# Patient Record
Sex: MALE | Race: WHITE | NOT HISPANIC OR LATINO | Employment: FULL TIME | URBAN - NONMETROPOLITAN AREA
[De-identification: names, ages, dates, MRNs, and addresses within clinical notes are randomized per-mention and may not be internally consistent; named-entity substitution may affect disease eponyms.]

---

## 2019-04-16 ENCOUNTER — OFFICE VISIT (OUTPATIENT)
Dept: FAMILY MEDICINE CLINIC | Facility: CLINIC | Age: 59
End: 2019-04-16

## 2019-04-16 VITALS
BODY MASS INDEX: 26.69 KG/M2 | WEIGHT: 156.31 LBS | HEART RATE: 74 BPM | SYSTOLIC BLOOD PRESSURE: 124 MMHG | OXYGEN SATURATION: 98 % | HEIGHT: 64 IN | DIASTOLIC BLOOD PRESSURE: 66 MMHG

## 2019-04-16 DIAGNOSIS — R21 RASH: ICD-10-CM

## 2019-04-16 DIAGNOSIS — J45.909 UNCOMPLICATED ASTHMA, UNSPECIFIED ASTHMA SEVERITY, UNSPECIFIED WHETHER PERSISTENT: ICD-10-CM

## 2019-04-16 DIAGNOSIS — J40 BRONCHITIS: Primary | ICD-10-CM

## 2019-04-16 PROCEDURE — 99213 OFFICE O/P EST LOW 20 MIN: CPT | Performed by: FAMILY MEDICINE

## 2019-04-16 NOTE — PROGRESS NOTES
Subjective   Timmy Diop is a 58 y.o. male.   Cc:follow up  History of Present Illness The patient comes in for follow up of bronchitis. He is completing his medications and it is doing better.He has a rash that comes up when he gets sick.    The following portions of the patient's history were reviewed and updated as appropriate: allergies, current medications, past family history, past medical history, past social history, past surgical history and problem list.    Review of Systems   Constitutional: Negative for fatigue and fever.   Respiratory: Negative for cough, chest tightness and stridor.    Cardiovascular: Negative for chest pain, palpitations and leg swelling.       Objective   Physical Exam   Constitutional: He appears well-developed and well-nourished.   HENT:   Head: Normocephalic and atraumatic.   Right Ear: External ear normal.   Left Ear: External ear normal.   Nose: Nose normal.   Mouth/Throat: Oropharynx is clear and moist.   Eyes: Pupils are equal, round, and reactive to light.   Neck: Normal range of motion.   Cardiovascular: Normal rate, regular rhythm and normal heart sounds. Exam reveals no gallop and no friction rub.   No murmur heard.  Pulmonary/Chest: Effort normal and breath sounds normal. No stridor. No respiratory distress. He has no wheezes. He has no rales.   Abdominal: Soft. Bowel sounds are normal. He exhibits no distension and no mass. There is no tenderness. There is no guarding.   Skin: Skin is warm. Rash noted.   There is a plaque that measures four inch by two inches.    Vitals reviewed.        Assessment/Plan   Timmy was seen today for establish care.    Diagnoses and all orders for this visit:    Bronchitis    Uncomplicated asthma, unspecified asthma severity, unspecified whether persistent    Rash      Return to the clinic in 6 month/s.  Will contact with results as needed.

## 2020-05-03 ENCOUNTER — APPOINTMENT (OUTPATIENT)
Dept: GENERAL RADIOLOGY | Facility: HOSPITAL | Age: 60
End: 2020-05-03

## 2020-05-03 ENCOUNTER — HOSPITAL ENCOUNTER (INPATIENT)
Facility: HOSPITAL | Age: 60
LOS: 5 days | Discharge: HOME | End: 2020-05-08
Attending: EMERGENCY MEDICINE | Admitting: HOSPITALIST

## 2020-05-03 ENCOUNTER — APPOINTMENT (OUTPATIENT)
Dept: CT IMAGING | Facility: HOSPITAL | Age: 60
End: 2020-05-03

## 2020-05-03 DIAGNOSIS — J96.01 ACUTE RESPIRATORY FAILURE WITH HYPOXIA AND HYPERCAPNIA (HCC): Primary | ICD-10-CM

## 2020-05-03 DIAGNOSIS — A41.9 SEPSIS, DUE TO UNSPECIFIED ORGANISM, UNSPECIFIED WHETHER ACUTE ORGAN DYSFUNCTION PRESENT (HCC): ICD-10-CM

## 2020-05-03 DIAGNOSIS — J96.02 ACUTE RESPIRATORY FAILURE WITH HYPOXIA AND HYPERCAPNIA (HCC): Primary | ICD-10-CM

## 2020-05-03 DIAGNOSIS — Z74.09 IMPAIRED PHYSICAL MOBILITY: ICD-10-CM

## 2020-05-03 DIAGNOSIS — Z74.09 IMPAIRED MOBILITY AND ACTIVITIES OF DAILY LIVING: ICD-10-CM

## 2020-05-03 DIAGNOSIS — Z78.9 IMPAIRED MOBILITY AND ACTIVITIES OF DAILY LIVING: ICD-10-CM

## 2020-05-03 DIAGNOSIS — J18.9 PNEUMONIA OF BOTH LOWER LOBES DUE TO INFECTIOUS ORGANISM: ICD-10-CM

## 2020-05-03 LAB
ALBUMIN SERPL-MCNC: 4.1 G/DL (ref 3.5–5.2)
ALBUMIN/GLOB SERPL: 1.1 G/DL
ALP SERPL-CCNC: 99 U/L (ref 39–117)
ALT SERPL W P-5'-P-CCNC: 27 U/L (ref 1–41)
ANION GAP SERPL CALCULATED.3IONS-SCNC: 19 MMOL/L (ref 5–15)
ARTERIAL PATENCY WRIST A: POSITIVE
ARTERIAL PATENCY WRIST A: POSITIVE
AST SERPL-CCNC: 27 U/L (ref 1–40)
ATMOSPHERIC PRESS: 745 MMHG
ATMOSPHERIC PRESS: 746 MMHG
B PERT DNA SPEC QL NAA+PROBE: NOT DETECTED
BASE EXCESS BLDA CALC-SCNC: 0.1 MMOL/L (ref 0–2)
BASE EXCESS BLDA CALC-SCNC: 0.1 MMOL/L (ref 0–2)
BASOPHILS # BLD AUTO: 0.15 10*3/MM3 (ref 0–0.2)
BASOPHILS NFR BLD AUTO: 1 % (ref 0–1.5)
BDY SITE: ABNORMAL
BDY SITE: ABNORMAL
BILIRUB SERPL-MCNC: 0.4 MG/DL (ref 0.2–1.2)
BUN BLD-MCNC: 10 MG/DL (ref 6–20)
BUN/CREAT SERPL: 9.3 (ref 7–25)
C PNEUM DNA NPH QL NAA+NON-PROBE: NOT DETECTED
CALCIUM SPEC-SCNC: 9.5 MG/DL (ref 8.6–10.5)
CHLORIDE SERPL-SCNC: 102 MMOL/L (ref 98–107)
CO2 SERPL-SCNC: 24 MMOL/L (ref 22–29)
CREAT BLD-MCNC: 1.07 MG/DL (ref 0.76–1.27)
CRP SERPL-MCNC: 1 MG/DL (ref 0–0.5)
D-LACTATE SERPL-SCNC: 1.9 MMOL/L (ref 0.5–2)
D-LACTATE SERPL-SCNC: 3.4 MMOL/L (ref 0.5–2)
DEPRECATED RDW RBC AUTO: 45.8 FL (ref 37–54)
EOSINOPHIL # BLD AUTO: 1.03 10*3/MM3 (ref 0–0.4)
EOSINOPHIL NFR BLD AUTO: 6.7 % (ref 0.3–6.2)
ERYTHROCYTE [DISTWIDTH] IN BLOOD BY AUTOMATED COUNT: 13.4 % (ref 12.3–15.4)
FLUAV H1 2009 PAND RNA NPH QL NAA+PROBE: NOT DETECTED
FLUAV H1 HA GENE NPH QL NAA+PROBE: NOT DETECTED
FLUAV H3 RNA NPH QL NAA+PROBE: NOT DETECTED
FLUAV SUBTYP SPEC NAA+PROBE: NOT DETECTED
FLUBV RNA ISLT QL NAA+PROBE: NOT DETECTED
GFR SERPL CREATININE-BSD FRML MDRD: 71 ML/MIN/1.73
GLOBULIN UR ELPH-MCNC: 3.9 GM/DL
GLUCOSE BLD-MCNC: 130 MG/DL (ref 65–99)
HADV DNA SPEC NAA+PROBE: NOT DETECTED
HCO3 BLDA-SCNC: 28.5 MMOL/L (ref 20–26)
HCO3 BLDA-SCNC: 29.5 MMOL/L (ref 20–26)
HCOV 229E RNA SPEC QL NAA+PROBE: NOT DETECTED
HCOV HKU1 RNA SPEC QL NAA+PROBE: NOT DETECTED
HCOV NL63 RNA SPEC QL NAA+PROBE: NOT DETECTED
HCOV OC43 RNA SPEC QL NAA+PROBE: NOT DETECTED
HCT VFR BLD AUTO: 53.2 % (ref 37.5–51)
HGB BLD-MCNC: 17.8 G/DL (ref 13–17.7)
HMPV RNA NPH QL NAA+NON-PROBE: NOT DETECTED
HOLD SPECIMEN: NORMAL
HOLD SPECIMEN: NORMAL
HOROWITZ INDEX BLD+IHG-RTO: 100 %
HPIV1 RNA SPEC QL NAA+PROBE: NOT DETECTED
HPIV2 RNA SPEC QL NAA+PROBE: NOT DETECTED
HPIV3 RNA NPH QL NAA+PROBE: NOT DETECTED
HPIV4 P GENE NPH QL NAA+PROBE: NOT DETECTED
IMM GRANULOCYTES # BLD AUTO: 0.08 10*3/MM3 (ref 0–0.05)
IMM GRANULOCYTES NFR BLD AUTO: 0.5 % (ref 0–0.5)
LACTATE HOLD SPECIMEN: NORMAL
LDH SERPL-CCNC: 218 U/L (ref 135–225)
LYMPHOCYTES # BLD AUTO: 4.75 10*3/MM3 (ref 0.7–3.1)
LYMPHOCYTES NFR BLD AUTO: 30.8 % (ref 19.6–45.3)
Lab: ABNORMAL
Lab: ABNORMAL
M PNEUMO IGG SER IA-ACNC: NOT DETECTED
MAGNESIUM SERPL-MCNC: 2.2 MG/DL (ref 1.6–2.6)
MCH RBC QN AUTO: 31.4 PG (ref 26.6–33)
MCHC RBC AUTO-ENTMCNC: 33.5 G/DL (ref 31.5–35.7)
MCV RBC AUTO: 93.8 FL (ref 79–97)
MODALITY: ABNORMAL
MODALITY: ABNORMAL
MONOCYTES # BLD AUTO: 1.69 10*3/MM3 (ref 0.1–0.9)
MONOCYTES NFR BLD AUTO: 11 % (ref 5–12)
NEUTROPHILS # BLD AUTO: 7.73 10*3/MM3 (ref 1.7–7)
NEUTROPHILS NFR BLD AUTO: 50 % (ref 42.7–76)
NRBC BLD AUTO-RTO: 0 /100 WBC (ref 0–0.2)
NT-PROBNP SERPL-MCNC: 66.8 PG/ML (ref 5–900)
PCO2 BLDA: 58.8 MM HG (ref 35–45)
PCO2 BLDA: 65.8 MM HG (ref 35–45)
PEEP RESPIRATORY: 5 CM[H2O]
PH BLDA: 7.26 PH UNITS (ref 7.35–7.45)
PH BLDA: 7.29 PH UNITS (ref 7.35–7.45)
PLATELET # BLD AUTO: 407 10*3/MM3 (ref 140–450)
PMV BLD AUTO: 10.2 FL (ref 6–12)
PO2 BLDA: 439 MM HG (ref 83–108)
PO2 BLDA: 87.4 MM HG (ref 83–108)
POTASSIUM BLD-SCNC: 4.3 MMOL/L (ref 3.5–5.2)
PROCALCITONIN SERPL-MCNC: 0.16 NG/ML (ref 0.1–0.25)
PROT SERPL-MCNC: 8 G/DL (ref 6–8.5)
RBC # BLD AUTO: 5.67 10*6/MM3 (ref 4.14–5.8)
RHINOVIRUS RNA SPEC NAA+PROBE: NOT DETECTED
RSV RNA NPH QL NAA+NON-PROBE: NOT DETECTED
SAO2 % BLDCOA: 95.7 % (ref 94–99)
SAO2 % BLDCOA: >100 % (ref 94–99)
SET MECH RESP RATE: 18
SODIUM BLD-SCNC: 145 MMOL/L (ref 136–145)
TROPONIN T SERPL-MCNC: <0.01 NG/ML (ref 0–0.03)
VENTILATOR MODE: ABNORMAL
VENTILATOR MODE: AC
VT ON VENT VENT: 500 ML
WBC NRBC COR # BLD: 15.43 10*3/MM3 (ref 3.4–10.8)
WHOLE BLOOD HOLD SPECIMEN: NORMAL
WHOLE BLOOD HOLD SPECIMEN: NORMAL

## 2020-05-03 PROCEDURE — 93010 ELECTROCARDIOGRAM REPORT: CPT | Performed by: INTERNAL MEDICINE

## 2020-05-03 PROCEDURE — 84484 ASSAY OF TROPONIN QUANT: CPT | Performed by: EMERGENCY MEDICINE

## 2020-05-03 PROCEDURE — 25010000002 SUCCINYLCHOLINE PER 20 MG: Performed by: EMERGENCY MEDICINE

## 2020-05-03 PROCEDURE — 0099U HC BIOFIRE FILMARRAY RESP PANEL 1: CPT | Performed by: EMERGENCY MEDICINE

## 2020-05-03 PROCEDURE — 94002 VENT MGMT INPAT INIT DAY: CPT

## 2020-05-03 PROCEDURE — 87040 BLOOD CULTURE FOR BACTERIA: CPT | Performed by: EMERGENCY MEDICINE

## 2020-05-03 PROCEDURE — 83605 ASSAY OF LACTIC ACID: CPT | Performed by: EMERGENCY MEDICINE

## 2020-05-03 PROCEDURE — 94640 AIRWAY INHALATION TREATMENT: CPT

## 2020-05-03 PROCEDURE — 25010000002 PROPOFOL 10 MG/ML EMULSION: Performed by: EMERGENCY MEDICINE

## 2020-05-03 PROCEDURE — 94799 UNLISTED PULMONARY SVC/PX: CPT

## 2020-05-03 PROCEDURE — 5A1945Z RESPIRATORY VENTILATION, 24-96 CONSECUTIVE HOURS: ICD-10-PCS | Performed by: HOSPITALIST

## 2020-05-03 PROCEDURE — 86140 C-REACTIVE PROTEIN: CPT | Performed by: EMERGENCY MEDICINE

## 2020-05-03 PROCEDURE — 87635 SARS-COV-2 COVID-19 AMP PRB: CPT | Performed by: EMERGENCY MEDICINE

## 2020-05-03 PROCEDURE — 25010000002 METHYLPREDNISOLONE PER 125 MG

## 2020-05-03 PROCEDURE — 71045 X-RAY EXAM CHEST 1 VIEW: CPT

## 2020-05-03 PROCEDURE — 82803 BLOOD GASES ANY COMBINATION: CPT

## 2020-05-03 PROCEDURE — 31500 INSERT EMERGENCY AIRWAY: CPT

## 2020-05-03 PROCEDURE — 85025 COMPLETE CBC W/AUTO DIFF WBC: CPT | Performed by: EMERGENCY MEDICINE

## 2020-05-03 PROCEDURE — 25010000002 PIPERACILLIN-TAZOBACTAM: Performed by: EMERGENCY MEDICINE

## 2020-05-03 PROCEDURE — 99291 CRITICAL CARE FIRST HOUR: CPT

## 2020-05-03 PROCEDURE — 0BH17EZ INSERTION OF ENDOTRACHEAL AIRWAY INTO TRACHEA, VIA NATURAL OR ARTIFICIAL OPENING: ICD-10-PCS | Performed by: EMERGENCY MEDICINE

## 2020-05-03 PROCEDURE — 84145 PROCALCITONIN (PCT): CPT | Performed by: EMERGENCY MEDICINE

## 2020-05-03 PROCEDURE — 25010000002 PROPOFOL 10 MG/ML EMULSION: Performed by: HOSPITALIST

## 2020-05-03 PROCEDURE — 83615 LACTATE (LD) (LDH) ENZYME: CPT | Performed by: EMERGENCY MEDICINE

## 2020-05-03 PROCEDURE — 25010000002 ENOXAPARIN PER 10 MG: Performed by: EMERGENCY MEDICINE

## 2020-05-03 PROCEDURE — 93005 ELECTROCARDIOGRAM TRACING: CPT | Performed by: EMERGENCY MEDICINE

## 2020-05-03 PROCEDURE — 25010000002 AZITHROMYCIN 500 MG/250 ML: Performed by: EMERGENCY MEDICINE

## 2020-05-03 PROCEDURE — 80053 COMPREHEN METABOLIC PANEL: CPT | Performed by: EMERGENCY MEDICINE

## 2020-05-03 PROCEDURE — 83735 ASSAY OF MAGNESIUM: CPT | Performed by: EMERGENCY MEDICINE

## 2020-05-03 PROCEDURE — 83880 ASSAY OF NATRIURETIC PEPTIDE: CPT | Performed by: EMERGENCY MEDICINE

## 2020-05-03 RX ORDER — METHYLPREDNISOLONE SODIUM SUCCINATE 125 MG/2ML
125 INJECTION, POWDER, LYOPHILIZED, FOR SOLUTION INTRAMUSCULAR; INTRAVENOUS ONCE
Status: COMPLETED | OUTPATIENT
Start: 2020-05-03 | End: 2020-05-03

## 2020-05-03 RX ORDER — SODIUM CHLORIDE 0.9 % (FLUSH) 0.9 %
10 SYRINGE (ML) INJECTION EVERY 12 HOURS SCHEDULED
Status: DISCONTINUED | OUTPATIENT
Start: 2020-05-04 | End: 2020-05-08 | Stop reason: HOSPADM

## 2020-05-03 RX ORDER — IPRATROPIUM BROMIDE AND ALBUTEROL SULFATE 2.5; .5 MG/3ML; MG/3ML
3 SOLUTION RESPIRATORY (INHALATION)
Status: DISCONTINUED | OUTPATIENT
Start: 2020-05-03 | End: 2020-05-03

## 2020-05-03 RX ORDER — VECURONIUM BROMIDE 20 MG/20ML
7 INJECTION, POWDER, LYOPHILIZED, FOR SOLUTION INTRAVENOUS ONCE
Status: COMPLETED | OUTPATIENT
Start: 2020-05-03 | End: 2020-05-03

## 2020-05-03 RX ORDER — PROPOFOL 10 MG/ML
VIAL (ML) INTRAVENOUS
Status: COMPLETED | OUTPATIENT
Start: 2020-05-03 | End: 2020-05-03

## 2020-05-03 RX ORDER — SUCCINYLCHOLINE CHLORIDE 20 MG/ML
INJECTION INTRAMUSCULAR; INTRAVENOUS
Status: COMPLETED | OUTPATIENT
Start: 2020-05-03 | End: 2020-05-03

## 2020-05-03 RX ORDER — DEXTROSE MONOHYDRATE 25 G/50ML
25 INJECTION, SOLUTION INTRAVENOUS
Status: DISCONTINUED | OUTPATIENT
Start: 2020-05-03 | End: 2020-05-08 | Stop reason: HOSPADM

## 2020-05-03 RX ORDER — FAMOTIDINE 10 MG/ML
20 INJECTION, SOLUTION INTRAVENOUS EVERY 12 HOURS SCHEDULED
Status: DISCONTINUED | OUTPATIENT
Start: 2020-05-04 | End: 2020-05-08 | Stop reason: HOSPADM

## 2020-05-03 RX ORDER — PROPOFOL 10 MG/ML
VIAL (ML) INTRAVENOUS
Status: DISCONTINUED
Start: 2020-05-03 | End: 2020-05-08 | Stop reason: HOSPADM

## 2020-05-03 RX ORDER — ALBUTEROL SULFATE 90 UG/1
2 AEROSOL, METERED RESPIRATORY (INHALATION)
Status: DISCONTINUED | OUTPATIENT
Start: 2020-05-04 | End: 2020-05-08 | Stop reason: HOSPADM

## 2020-05-03 RX ORDER — SODIUM CHLORIDE 0.9 % (FLUSH) 0.9 %
10 SYRINGE (ML) INJECTION AS NEEDED
Status: DISCONTINUED | OUTPATIENT
Start: 2020-05-03 | End: 2020-05-08 | Stop reason: HOSPADM

## 2020-05-03 RX ORDER — SODIUM CHLORIDE 9 MG/ML
10 INJECTION, SOLUTION INTRAVENOUS CONTINUOUS
Status: DISCONTINUED | OUTPATIENT
Start: 2020-05-03 | End: 2020-05-05

## 2020-05-03 RX ORDER — NICOTINE POLACRILEX 4 MG
15 LOZENGE BUCCAL
Status: DISCONTINUED | OUTPATIENT
Start: 2020-05-03 | End: 2020-05-08 | Stop reason: HOSPADM

## 2020-05-03 RX ORDER — MIDAZOLAM HYDROCHLORIDE 1 MG/ML
INJECTION INTRAMUSCULAR; INTRAVENOUS
Status: DISCONTINUED
Start: 2020-05-03 | End: 2020-05-08 | Stop reason: HOSPADM

## 2020-05-03 RX ORDER — CHLORHEXIDINE GLUCONATE 0.12 MG/ML
15 RINSE ORAL EVERY 12 HOURS SCHEDULED
Status: DISCONTINUED | OUTPATIENT
Start: 2020-05-04 | End: 2020-05-06

## 2020-05-03 RX ORDER — METHYLPREDNISOLONE SODIUM SUCCINATE 125 MG/2ML
INJECTION, POWDER, LYOPHILIZED, FOR SOLUTION INTRAMUSCULAR; INTRAVENOUS
Status: COMPLETED
Start: 2020-05-03 | End: 2020-05-03

## 2020-05-03 RX ORDER — ETOMIDATE 2 MG/ML
INJECTION INTRAVENOUS
Status: COMPLETED | OUTPATIENT
Start: 2020-05-03 | End: 2020-05-03

## 2020-05-03 RX ADMIN — IPRATROPIUM BROMIDE AND ALBUTEROL SULFATE 3 ML: 2.5; .5 SOLUTION RESPIRATORY (INHALATION) at 19:21

## 2020-05-03 RX ADMIN — METHYLPREDNISOLONE SODIUM SUCCINATE 125 MG: 125 INJECTION, POWDER, FOR SOLUTION INTRAMUSCULAR; INTRAVENOUS at 19:19

## 2020-05-03 RX ADMIN — PROPOFOL 70 MCG/KG/MIN: 10 INJECTION, EMULSION INTRAVENOUS at 22:18

## 2020-05-03 RX ADMIN — ENOXAPARIN SODIUM 80 MG: 80 INJECTION SUBCUTANEOUS at 22:15

## 2020-05-03 RX ADMIN — AZITHROMYCIN 500 MG: 500 INJECTION, POWDER, LYOPHILIZED, FOR SOLUTION INTRAVENOUS at 21:02

## 2020-05-03 RX ADMIN — ETOMIDATE 20 MG: 2 INJECTION, SOLUTION INTRAVENOUS at 19:45

## 2020-05-03 RX ADMIN — SUCCINYLCHOLINE CHLORIDE 100 MG: 20 INJECTION, SOLUTION INTRAMUSCULAR; INTRAVENOUS at 19:46

## 2020-05-03 RX ADMIN — SODIUM CHLORIDE 125 ML/HR: 900 INJECTION, SOLUTION INTRAVENOUS at 20:54

## 2020-05-03 RX ADMIN — PIPERACILLIN SODIUM,TAZOBACTAM SODIUM 3.38 G: 3; .375 INJECTION, POWDER, FOR SOLUTION INTRAVENOUS at 20:38

## 2020-05-03 RX ADMIN — VECURONIUM BROMIDE 7 MG: 1 INJECTION, POWDER, LYOPHILIZED, FOR SOLUTION INTRAVENOUS at 20:17

## 2020-05-03 RX ADMIN — PROPOFOL 30 MCG/KG/MIN: 10 INJECTION, EMULSION INTRAVENOUS at 20:03

## 2020-05-03 RX ADMIN — PROPOFOL 20 MCG/KG/MIN: 10 INJECTION, EMULSION INTRAVENOUS at 19:52

## 2020-05-03 RX ADMIN — METHYLPREDNISOLONE SODIUM SUCCINATE 125 MG: 125 INJECTION, POWDER, LYOPHILIZED, FOR SOLUTION INTRAMUSCULAR; INTRAVENOUS at 19:19

## 2020-05-04 ENCOUNTER — APPOINTMENT (OUTPATIENT)
Dept: GENERAL RADIOLOGY | Facility: HOSPITAL | Age: 60
End: 2020-05-04

## 2020-05-04 LAB
ANION GAP SERPL CALCULATED.3IONS-SCNC: 14 MMOL/L (ref 5–15)
ARTERIAL PATENCY WRIST A: ABNORMAL
ATMOSPHERIC PRESS: 747 MMHG
BASE EXCESS BLDA CALC-SCNC: 1.7 MMOL/L (ref 0–2)
BASOPHILS # BLD AUTO: 0.02 10*3/MM3 (ref 0–0.2)
BASOPHILS NFR BLD AUTO: 0.2 % (ref 0–1.5)
BDY SITE: ABNORMAL
BUN BLD-MCNC: 12 MG/DL (ref 6–20)
BUN/CREAT SERPL: 17.6 (ref 7–25)
CALCIUM SPEC-SCNC: 9 MG/DL (ref 8.6–10.5)
CHLORIDE SERPL-SCNC: 107 MMOL/L (ref 98–107)
CO2 SERPL-SCNC: 23 MMOL/L (ref 22–29)
CREAT BLD-MCNC: 0.68 MG/DL (ref 0.76–1.27)
DEPRECATED RDW RBC AUTO: 44.9 FL (ref 37–54)
EOSINOPHIL # BLD AUTO: 0.01 10*3/MM3 (ref 0–0.4)
EOSINOPHIL NFR BLD AUTO: 0.1 % (ref 0.3–6.2)
ERYTHROCYTE [DISTWIDTH] IN BLOOD BY AUTOMATED COUNT: 13.3 % (ref 12.3–15.4)
GFR SERPL CREATININE-BSD FRML MDRD: 120 ML/MIN/1.73
GLUCOSE BLD-MCNC: 169 MG/DL (ref 65–99)
GLUCOSE BLDC GLUCOMTR-MCNC: 112 MG/DL (ref 70–130)
GLUCOSE BLDC GLUCOMTR-MCNC: 141 MG/DL (ref 70–130)
GLUCOSE BLDC GLUCOMTR-MCNC: 155 MG/DL (ref 70–130)
GLUCOSE BLDC GLUCOMTR-MCNC: 157 MG/DL (ref 70–130)
HCO3 BLDA-SCNC: 25.9 MMOL/L (ref 20–26)
HCT VFR BLD AUTO: 47.2 % (ref 37.5–51)
HGB BLD-MCNC: 16.1 G/DL (ref 13–17.7)
HOROWITZ INDEX BLD+IHG-RTO: 30 %
IMM GRANULOCYTES # BLD AUTO: 0.05 10*3/MM3 (ref 0–0.05)
IMM GRANULOCYTES NFR BLD AUTO: 0.6 % (ref 0–0.5)
LYMPHOCYTES # BLD AUTO: 0.74 10*3/MM3 (ref 0.7–3.1)
LYMPHOCYTES NFR BLD AUTO: 8.8 % (ref 19.6–45.3)
Lab: ABNORMAL
MCH RBC QN AUTO: 31.1 PG (ref 26.6–33)
MCHC RBC AUTO-ENTMCNC: 34.1 G/DL (ref 31.5–35.7)
MCV RBC AUTO: 91.1 FL (ref 79–97)
MODALITY: ABNORMAL
MONOCYTES # BLD AUTO: 0.12 10*3/MM3 (ref 0.1–0.9)
MONOCYTES NFR BLD AUTO: 1.4 % (ref 5–12)
NEUTROPHILS # BLD AUTO: 7.43 10*3/MM3 (ref 1.7–7)
NEUTROPHILS NFR BLD AUTO: 88.9 % (ref 42.7–76)
NRBC BLD AUTO-RTO: 0 /100 WBC (ref 0–0.2)
PAW @ PEAK INSP FLOW SETTING VENT: 36 CMH2O
PCO2 BLDA: 38.6 MM HG (ref 35–45)
PEEP RESPIRATORY: 5 CM[H2O]
PH BLDA: 7.43 PH UNITS (ref 7.35–7.45)
PLATELET # BLD AUTO: 292 10*3/MM3 (ref 140–450)
PMV BLD AUTO: 10.8 FL (ref 6–12)
PO2 BLDA: 78.5 MM HG (ref 83–108)
POTASSIUM BLD-SCNC: 3.8 MMOL/L (ref 3.5–5.2)
RBC # BLD AUTO: 5.18 10*6/MM3 (ref 4.14–5.8)
SAO2 % BLDCOA: 96.5 % (ref 94–99)
SARS-COV-2 RNA RESP QL NAA+PROBE: NOT DETECTED
SET MECH RESP RATE: 20
SODIUM BLD-SCNC: 144 MMOL/L (ref 136–145)
VENTILATOR MODE: ABNORMAL
VT ON VENT VENT: 520 ML
WBC NRBC COR # BLD: 8.37 10*3/MM3 (ref 3.4–10.8)

## 2020-05-04 PROCEDURE — 3E0G76Z INTRODUCTION OF NUTRITIONAL SUBSTANCE INTO UPPER GI, VIA NATURAL OR ARTIFICIAL OPENING: ICD-10-PCS | Performed by: INTERNAL MEDICINE

## 2020-05-04 PROCEDURE — 85025 COMPLETE CBC W/AUTO DIFF WBC: CPT | Performed by: HOSPITALIST

## 2020-05-04 PROCEDURE — 94799 UNLISTED PULMONARY SVC/PX: CPT

## 2020-05-04 PROCEDURE — 25010000002 ENOXAPARIN PER 10 MG: Performed by: HOSPITALIST

## 2020-05-04 PROCEDURE — 25010000002 PROPOFOL 10 MG/ML EMULSION: Performed by: HOSPITALIST

## 2020-05-04 PROCEDURE — 87635 SARS-COV-2 COVID-19 AMP PRB: CPT | Performed by: INTERNAL MEDICINE

## 2020-05-04 PROCEDURE — 82962 GLUCOSE BLOOD TEST: CPT

## 2020-05-04 PROCEDURE — 82803 BLOOD GASES ANY COMBINATION: CPT

## 2020-05-04 PROCEDURE — 80048 BASIC METABOLIC PNL TOTAL CA: CPT | Performed by: HOSPITALIST

## 2020-05-04 PROCEDURE — 36600 WITHDRAWAL OF ARTERIAL BLOOD: CPT

## 2020-05-04 PROCEDURE — 25010000002 ENOXAPARIN PER 10 MG: Performed by: INTERNAL MEDICINE

## 2020-05-04 PROCEDURE — 94640 AIRWAY INHALATION TREATMENT: CPT

## 2020-05-04 RX ORDER — MIDAZOLAM HYDROCHLORIDE 1 MG/ML
2 INJECTION INTRAMUSCULAR; INTRAVENOUS ONCE
Status: DISCONTINUED | OUTPATIENT
Start: 2020-05-04 | End: 2020-05-06

## 2020-05-04 RX ADMIN — FAMOTIDINE 20 MG: 10 INJECTION INTRAVENOUS at 20:09

## 2020-05-04 RX ADMIN — ENOXAPARIN SODIUM 80 MG: 80 INJECTION SUBCUTANEOUS at 08:03

## 2020-05-04 RX ADMIN — CHLORHEXIDINE GLUCONATE 0.12% ORAL RINSE 15 ML: 1.2 LIQUID ORAL at 00:33

## 2020-05-04 RX ADMIN — ENOXAPARIN SODIUM 40 MG: 40 INJECTION SUBCUTANEOUS at 20:08

## 2020-05-04 RX ADMIN — SODIUM CHLORIDE, PRESERVATIVE FREE 10 ML: 5 INJECTION INTRAVENOUS at 20:10

## 2020-05-04 RX ADMIN — PROPOFOL 80 MCG/KG/MIN: 10 INJECTION, EMULSION INTRAVENOUS at 14:12

## 2020-05-04 RX ADMIN — CHLORHEXIDINE GLUCONATE 0.12% ORAL RINSE 15 ML: 1.2 LIQUID ORAL at 20:09

## 2020-05-04 RX ADMIN — SODIUM CHLORIDE, PRESERVATIVE FREE 10 ML: 5 INJECTION INTRAVENOUS at 08:05

## 2020-05-04 RX ADMIN — PROPOFOL 70 MCG/KG/MIN: 10 INJECTION, EMULSION INTRAVENOUS at 20:17

## 2020-05-04 RX ADMIN — ALBUTEROL SULFATE 2 PUFF: 90 AEROSOL, METERED RESPIRATORY (INHALATION) at 20:56

## 2020-05-04 RX ADMIN — FAMOTIDINE 20 MG: 10 INJECTION INTRAVENOUS at 00:33

## 2020-05-04 RX ADMIN — PROPOFOL 70 MCG/KG/MIN: 10 INJECTION, EMULSION INTRAVENOUS at 23:24

## 2020-05-04 RX ADMIN — PROPOFOL 55 MCG/KG/MIN: 10 INJECTION, EMULSION INTRAVENOUS at 01:36

## 2020-05-04 RX ADMIN — ALBUTEROL SULFATE 2 PUFF: 90 AEROSOL, METERED RESPIRATORY (INHALATION) at 08:37

## 2020-05-04 RX ADMIN — PROPOFOL 80 MCG/KG/MIN: 10 INJECTION, EMULSION INTRAVENOUS at 17:03

## 2020-05-04 RX ADMIN — PROPOFOL 60 MCG/KG/MIN: 10 INJECTION, EMULSION INTRAVENOUS at 11:04

## 2020-05-04 RX ADMIN — PROPOFOL 70 MCG/KG/MIN: 10 INJECTION, EMULSION INTRAVENOUS at 05:44

## 2020-05-04 RX ADMIN — CHLORHEXIDINE GLUCONATE 0.12% ORAL RINSE 15 ML: 1.2 LIQUID ORAL at 08:03

## 2020-05-04 RX ADMIN — FAMOTIDINE 20 MG: 10 INJECTION INTRAVENOUS at 08:03

## 2020-05-04 RX ADMIN — PROPOFOL 70 MCG/KG/MIN: 10 INJECTION, EMULSION INTRAVENOUS at 10:48

## 2020-05-04 NOTE — PLAN OF CARE
Problem: Ventilation, Mechanical Invasive (Adult)  Goal: Signs and Symptoms of Listed Potential Problems Will be Absent, Minimized or Managed (Ventilation, Mechanical Invasive)  Outcome: Ongoing (interventions implemented as appropriate)     Problem: Ventilation, Mechanical Invasive (Adult)  Intervention: Prevent Airway Displacement/Mechanical Dysfunction  Flowsheets (Taken 5/4/2020 1639)  Airway Safety Measures: suction at bedside  Intervention: Prevent Airway-Related Skin/Tissue Breakdown  Flowsheets (Taken 5/4/2020 1639)  Device Skin Pressure Protection: skin-to-device areas padded  Intervention: Prevent Ventilator-Induced Lung Injury  Flowsheets (Taken 5/4/2020 1639)  Lung Protection Measures: lung compliance monitored; optimal PEEP applied; plateau/inspiratory pressure monitored  Intervention: Prevent Ventilator-Associated Pneumonia (VAP)  Flowsheets  Taken 5/4/2020 1608 by Kim Echeverria RRT  Head of Bed (HOB): HOB at 30 degrees  Taken 5/4/2020 0420 by Alexsandra Knight, RN  VAP Prevention Bundle: HOB elevation maintained;oral care regularly provided;vent circuit breaks minimized;VTE prophylaxis provided  Intervention: Optimize Oxygenation/Ventilation  Flowsheets (Taken 5/4/2020 1639)  Airway/Ventilation Management: airway patency maintained       Pt Fail SBT to became very agitated and would not follow comands

## 2020-05-04 NOTE — CONSULTS
Adult Nutrition  Assessment    Patient Name:  Timmy Diop  YOB: 1960  MRN: [id number]  Admit Date:  5/3/2020    Assessment Date:  5/4/2020    Comments:  Pt currently NPO on the vent related to Acute resp failure due to COVID 19 pneumonia vs pulmonary embolism vs bacterial infection.  He is currently on Propofol at 29.3cc/hr providing 774 calories.  RD consulted to start EN.  Will start Peptamen VHP with goal rate of 45cc/hr with current propofol.  COVID 19 test negative--retesting.  Will monitor.     Reason for Assessment     Row Name 05/04/20 1223          Reason for Assessment    Reason For Assessment  physician consult;TF/PN     Diagnosis  pulmonary disease     Identified At Risk by Screening Criteria  tube feeding or parenteral nutrition         Nutrition/Diet History     Row Name 05/04/20 1223          Nutrition/Diet History    Typical Food/Fluid Intake  Per staff MD wants tube feeding started.  They did test him for COVID 19 but it came back negative-they are retesting him         Anthropometrics     Row Name 05/04/20 0543          Anthropometrics    Weight  75.5 kg (166 lb 7.2 oz)         Labs/Tests/Procedures/Meds     Row Name 05/04/20 1224          Labs/Procedures/Meds    Lab Results Reviewed  reviewed, pertinent     Lab Results Comments  FSBS 155/169/157        Diagnostic Tests/Procedures    Diagnostic Test/Procedure Reviewed  reviewed, pertinent     Diagnostic Test/Procedures Comments  INtubated        Medications    Pertinent Medications Reviewed  reviewed, pertinent     Pertinent Medications Comments  SSI         Physical Findings     Row Name 05/04/20 1226          Physical Findings    Overall Physical Appearance  on ventilator support     Tubes  orogastric tube         Estimated/Assessed Needs     Row Name 05/04/20 1124          Calculation Measurements    Weight Used For Calculations  70.8 kg (156 lb)        Estimated/Assessed Needs    Additional Documentation  Additional  Requirements (Group);Fluid Requirements (Group);Iroquois-St. Jeor Equation (Group);Protein Requirements (Group);Calorie Requirements (Group);KCAL/KG (Group)        Calorie Requirements    Estimated Calorie Requirement (kcal/day)  1800     Estimated Calorie Need Method  kcal/kg        KCAL/KG    KCAL/KG  30 Kcal/Kg (kcal);25 Kcal/Kg (kcal)     25 Kcal/Kg (kcal)  1769.025     30 Kcal/Kg (kcal)  2122.83        Iroquois-St. Jeor Equation    RMR (Iroquois-St. Jeor Equation)  1454.485        Protein Requirements    Weight Used For Protein Calculations  70.8 kg (156 lb)     Est Protein Requirement Amount (gms/kg)  1.2 gm protein     Estimated Protein Requirements (gms/day)  84.91        Fluid Requirements    Estimated Fluid Requirements (mL/day)  1800     Estimated Fluid Requirement Method  RDA Method     RDA Method (mL)  1800         Nutrition Prescription Ordered     Row Name 05/04/20 1228          Nutrition Prescription PO    Current PO Diet  NPO        Propofol Considerations    Propofol (mL/hr)  29.3 mL/hr     Propofol (Kcal/day)  774 Kcal/day                 Electronically signed by:  Lani Belcher RD  05/04/20 12:34

## 2020-05-04 NOTE — PLAN OF CARE
Problem: Patient Care Overview  Goal: Plan of Care Review  Outcome: Ongoing (interventions implemented as appropriate)  Flowsheets (Taken 5/4/2020 1923)  Progress: no change

## 2020-05-04 NOTE — ED NOTES
Daughter, Kaylan Diop, wants to be called for updates at    [phone]     Colton Rubio RN  05/03/20 2047

## 2020-05-04 NOTE — PROGRESS NOTES
[facility] ER Medicine Services  INPATIENT PROGRESS NOTE    Length of Stay: 1  Date of Admission: 5/3/2020  Primary Care Physician: Arvin Ness MD    Subjective   Chief Complaint: Respiratory failure.    HPI:  Patient is a 59-year-old  male past medical history of COPD who presented to the emergency department with shortness of breath.  In the emergency department he was noted to be markedly hypoxic and dyspneic.  He was initially placed on a nonrebreather and given steroids and bronchodilators.  This did not alleviate his symptoms.  He required tracheal intubation and mechanical ventilation.    Patient is seen for follow-up today.  He remains intubated, sedated and restrained.    Review of Systems  Unable to review as the patient is intubated and sedated.  Objective    Temp:  [98.5 °F (36.9 °C)-99.3 °F (37.4 °C)] 99.3 °F (37.4 °C)  Heart Rate:  [] 74  Resp:  [20-45] 20  BP: (101-231)/() 106/70  FiO2 (%):  [30 %-100 %] 30 %    Vent Settings:  FiO2 (%):  [30 %-100 %] 30 %  S RR:  [18-20] 20  PEEP/CPAP (cm H2O):  [5 cm H20] 5 cm H20  WI SUP:  [0 cm H20] 0 cm H20  MAP (cm H2O):  [14-15] 15    Physical Exam   Constitutional: He appears well-developed and well-nourished. No distress. He is sedated, intubated and restrained.   HENT:   Head: Normocephalic and atraumatic.   Eyes: No scleral icterus.   Neck: Neck supple. No JVD present. No thyromegaly present.   Cardiovascular: Normal rate, regular rhythm and normal heart sounds. Exam reveals no gallop and no friction rub.   No murmur heard.  Pulmonary/Chest: Effort normal. He is intubated. He has decreased breath sounds. He has no wheezes. He has rhonchi. He has no rales. He exhibits no tenderness.   Abdominal: Soft. Bowel sounds are normal. He exhibits no distension and no mass. There is no tenderness. There is no rebound and no guarding.   Musculoskeletal: He exhibits no edema, tenderness or deformity.      Neurological: He exhibits normal muscle tone.   Skin: Skin is warm and dry. No rash noted. He is not diaphoretic. No erythema. No pallor.   Nursing note and vitals reviewed.        Medication Review:    Current Facility-Administered Medications:   •  albuterol sulfate HFA (PROVENTIL HFA;VENTOLIN HFA;PROAIR HFA) inhaler 2 puff, 2 puff, Inhalation, Q4H - RT, Raul Eddy MD, 2 puff at 05/04/20 0837  •  chlorhexidine (PERIDEX) 0.12 % solution 15 mL, 15 mL, Mouth/Throat, Q12H, Raul Eddy MD, 15 mL at 05/04/20 0803  •  dextrose (D50W) 25 g/ 50mL Intravenous Solution 25 g, 25 g, Intravenous, Q15 Min PRN, Raul Eddy MD  •  dextrose (GLUTOSE) oral gel 15 g, 15 g, Oral, Q15 Min PRN, Raul Eddy MD  •  enoxaparin (LOVENOX) syringe 80 mg, 1 mg/kg, Subcutaneous, Q12H, Raul Eddy MD, 80 mg at 05/04/20 0803  •  famotidine (PEPCID) injection 20 mg, 20 mg, Intravenous, Q12H, Raul Eddy MD, 20 mg at 05/04/20 0803  •  glucagon (human recombinant) (GLUCAGEN DIAGNOSTIC) injection 1 mg, 1 mg, Subcutaneous, Q15 Min PRN, Raul Eddy MD  •  insulin aspart (novoLOG) injection 0-9 Units, 0-9 Units, Subcutaneous, TID AC, Raul Eddy MD  •  midazolam (VERSED) 2 MG/2ML injection  - ADS Override Pull, , , ,   •  propofol (DIPRIVAN) infusion 10 mg/mL 100 mL, 5-100 mcg/kg/min, Intravenous, Titrated, Raul Eddy MD, Last Rate: 24.8 mL/hr at 05/04/20 1117, 55 mcg/kg/min at 05/04/20 1117  •  sodium chloride 0.9 % flush 10 mL, 10 mL, Intravenous, PRN, Raul Eddy MD  •  sodium chloride 0.9 % flush 10 mL, 10 mL, Intravenous, Q12H, Raul Eddy MD, 10 mL at 05/04/20 0805  •  sodium chloride 0.9 % flush 10 mL, 10 mL, Intravenous, PRN, Raul Eddy MD  •  sodium chloride 0.9 % infusion, 10 mL/hr, Intravenous, Continuous, Raul Eddy MD, Last Rate: 10 mL/hr at 05/04/20 0234, 10 mL/hr at 05/04/20 0234    Results Review:  I have reviewed the labs, radiology results,  and diagnostic studies.    Laboratory Data:   Results from last 7 days   Lab Units 05/04/20  0302 05/03/20  1920   SODIUM mmol/L 144 145   POTASSIUM mmol/L 3.8 4.3   CHLORIDE mmol/L 107 102   CO2 mmol/L 23.0 24.0   BUN mg/dL 12 10   CREATININE mg/dL 0.68* 1.07   GLUCOSE mg/dL 169* 130*   CALCIUM mg/dL 9.0 9.5   BILIRUBIN mg/dL  --  0.4   ALK PHOS U/L  --  99   ALT (SGPT) U/L  --  27   AST (SGOT) U/L  --  27   ANION GAP mmol/L 14.0 19.0*     Estimated Creatinine Clearance: 112.4 mL/min (A) (by C-G formula based on SCr of 0.68 mg/dL (L)).  Results from last 7 days   Lab Units 05/03/20  1920   MAGNESIUM mg/dL 2.2         Results from last 7 days   Lab Units 05/04/20  0302 05/03/20 1920   WBC 10*3/mm3 8.37 15.43*   HEMOGLOBIN g/dL 16.1 17.8*   HEMATOCRIT % 47.2 53.2*   PLATELETS 10*3/mm3 292 407           Culture Data:   No results found for: BLOODCX  No results found for: URINECX  No results found for: RESPCX  No results found for: WOUNDCX  No results found for: STOOLCX  No components found for: BODYFLD    Radiology Data:   Imaging Results (Last 24 Hours)     Procedure Component Value Units Date/Time    XR Chest 1 View [[id number]] Collected:  05/03/20 2001     Updated:  05/03/20 2032    Narrative:       PROCEDURE: XR CHEST 1 VIEW    Clinical History: [facility] Triage Protocol    Indication:     Same as above.    Comparison:     4/12/2019.    Technique:     Single portable frontal projection of the chest was done.    Findings:    The tip of the endotracheal tube is 6 cm above the tracheal  bifurcation. The tip of the orogastric tube appears to be in the  fundus of the stomach, although it is suboptimally visualized.    Scattered soft tissue calcifications in the lower neck and the  both sides of the chest and bilateral arms are again noted.    There are no discrete airspace infiltrates, pneumothoraces or  pleural effusions.    The pulmonary vascularity is normal.    The cardiomediastinal contour is  unremarkable.       Impression:       Impression:   The tip of the endotracheal tube is 6 cm above the tracheal  bifurcation.   The tip of the orogastric tube appears to be in the fundus of the  stomach, although it is suboptimally visualized.  There is no acute pleural-parenchymal process seen in the imaged  lung fields.    Electronically signed by:  Kristian Hanks MD  5/3/2020 8:31 PM CDT  Workstation: [id number]          ABG:  Results from last 7 days   Lab Units 05/04/20  0430   PH, ARTERIAL pH units 7.435   PO2 ART mm Hg 78.5*   PCO2, ARTERIAL mm Hg 38.6   HCO3 ART mmol/L 25.9       I have reviewed the patient's current medications.     Assessment/Plan     Hospital Problem List:  Active Problems:    Acute respiratory failure with hypoxia and hypercapnia (CMS/HCC)  Likely secondary to COPD exacerbation and to rule out COVID-19 viral infection: Patient's chest x-ray did not show any acute processes.  Continue ventilatory support, empiric antimicrobial therapy, bronchodilators and begin daily SBT's.  Consult pulmonary if the need arises.    Sepsis due to possible viral pneumonia: Patient did screen positive for sepsis.  Continue antimicrobial therapy pending outcome of blood cultures and COVID-19 PCR.  Lactic acidosis and reactive leukocytosis have resolved.      Nutrition: Consult dietitian for NG tube feeding.    Continue GI and DVT prophylaxis.    More than 50 minutes of critical care time was spent in the assessment and formulation of treatment plan for this patient excluding billable procedures.    The patient was evaluated during the global COVID-19 pandemic, and the diagnosis was suspected/considered upon their initial presentation.  Evaluation, treatment, and testing was consistent with current guidelines for patients who present with complaints or symptoms that may be related to COVID-19.      Discharge Planning: In progress.    Abhi Mccartney MD   05/04/20   12:05

## 2020-05-04 NOTE — PLAN OF CARE
Patient vital signs stable throughout evening and tolerating ventilator.  Attempted to wean sedation and patient became agitated and desynchronous with the ventilator. Sedation increased and issues resolved.

## 2020-05-04 NOTE — ED PROVIDER NOTES
Subjective   59 years old male with history of COPD presented in the ER with chief complaint of sudden worsening shortness of breath almost 1 hour prior to arrival.  Patient reports he has been having shortness of breath off and on on a regular basis, taken breathing treatment and not severe breathlessness.  On presentation in the ER his sats were in 60s.  Placed on nonrebreather, given DuoNeb and Solu-Medrol.  Patient was still in respiratory distress.  COVID testing is done.  After informed consent patient is intubated.  She is limited secondary to acuity of condition.      History limited by:  Acuity of condition      Review of Systems   Unable to perform ROS: Acuity of condition       Past Medical History:   Diagnosis Date   • Asthma    • COPD (chronic obstructive pulmonary disease) (CMS/[facility])    • Erysipelas of lower extremity        Allergies   Allergen Reactions   • Shellfish-Derived Products        Past Surgical History:   Procedure Laterality Date   • INCISION AND DRAINAGE ABSCESS      right lateral chest wall   • ROTATOR CUFF REPAIR Bilateral 2008    Dr Fajardo       Family History   Problem Relation Age of Onset   • Arrhythmia Sister    • Coronary artery disease Brother         stent placement   • Heart attack Brother         CABG       Social History     Socioeconomic History   • Marital status:      Spouse name: Not on file   • Number of children: Not on file   • Years of education: Not on file   • Highest education level: Not on file   Tobacco Use   • Smoking status: Never Smoker   • Smokeless tobacco: Never Used   • Tobacco comment: around a lot of second hand smoke   Substance and Sexual Activity   • Alcohol use: Yes     Alcohol/week: 0.0 standard drinks     Comment: rare   • Sexual activity: Never           Objective   Physical Exam   Constitutional: He is oriented to person, place, and time. He appears well-developed and well-nourished. He appears distressed.   HENT:   Head: Normocephalic and  atraumatic.   Nose: Nose normal.   Mouth/Throat: Oropharynx is clear and moist.   Eyes: Conjunctivae are normal.   Neck: Normal range of motion. Neck supple.   Cardiovascular: Regular rhythm. Tachycardia present.   Pulmonary/Chest: He is in respiratory distress. He has decreased breath sounds. He has wheezes. He has rales.   Abdominal: Soft. There is no tenderness.   Musculoskeletal: Normal range of motion.   Neurological: He is alert and oriented to person, place, and time.   Skin: Skin is warm. Capillary refill takes less than 2 seconds.   Psychiatric: He has a normal mood and affect.   Nursing note and vitals reviewed.      ECG 12 Lead    Date/Time: 5/3/2020 8:53 PM  Performed by: Declan Darden MD  Authorized by: Declan Darden MD   Interpreted by physician  Rhythm: sinus tachycardia  Rate: normal  BPM: 127  QRS axis: normal  Conduction: incomplete RBBB  Clinical impression: abnormal ECG  Comments: Nonspecific ST and T wave changes    Intubation  Date/Time: 5/3/2020 7:48 PM  Performed by: Declan Darden MD  Authorized by: Declan Darden MD     Consent:     Consent obtained:  Verbal    Consent given by:  Patient    Risks discussed:  Aspiration, hypoxia, dental trauma, laryngeal injury, pneumothorax, death, brain injury and bleeding  Universal protocol:     Site/side marked: yes      Immediately prior to procedure, a time out was called: yes      Patient identity confirmed:  Verbally with patient  Pre-procedure details:     Patient status:  Awake    Paralytics:  Succinylcholine  Procedure details:     Preoxygenation:  Nonrebreather mask    CPR in progress: no      Intubation method:  Oral    Oral intubation technique:  Video-assisted    Tube size (mm):  7.0    Tube type:  Cuffed    Number of attempts:  1    Cricoid pressure: no      Tube visualized through cords: yes    Placement assessment:     ETT to lip:  22    Tube secured with:  Adhesive tape    Breath sounds:  Equal    Placement verification: chest rise,  condensation, CXR verification, direct visualization, equal breath sounds, fiberoptic scope and tube exhalation      CXR findings:  ETT in proper place  Post-procedure details:     Patient tolerance of procedure:  Tolerated well, no immediate complications  Critical Care  Performed by: Declan Darden MD  Authorized by: Declan Darden MD     Critical care provider statement:     Critical care time (minutes):  45    Critical care time was exclusive of:  Separately billable procedures and treating other patients    Critical care was necessary to treat or prevent imminent or life-threatening deterioration of the following conditions:  Cardiac failure, respiratory failure and sepsis    Critical care was time spent personally by me on the following activities:  Evaluation of patient's response to treatment, examination of patient, obtaining history from patient or surrogate, ventilator management, re-evaluation of patient's condition, pulse oximetry, ordering and review of radiographic studies, ordering and review of laboratory studies and ordering and performing treatments and interventions    I assumed direction of critical care for this patient from another provider in my specialty: no                 ED Course                                           MDM  Number of Diagnoses or Management Options  Acute respiratory failure with hypoxia and hypercapnia (CMS/HCC):   Pneumonia of both lower lobes due to infectious organism (CMS/HCC):   Sepsis, due to unspecified organism, unspecified whether acute organ dysfunction present (CMS/HCC):   Diagnosis management comments: Patient was in respiratory distress on presentation and did not improve much after placing on nonrebreather and Solu-Medrol/DuoNeb.  Patient is intubated.  Chest x-ray showed bilateral Haziness which I am worried about having pneumonia.  He has a white count of 15 and lactate of 3.4.  Started on broad-spectrum antibiotics.  Because of sudden onset shortness of  breath there is a concern for PE but patient is allergic to shellfish and CT cannot be done until patient is premedicated.  I have discussed with Dr. Eddy and he will premedicate patient before CT and would be admitted to ICU.  We will give a dose of Lovenox until CT is done.       Amount and/or Complexity of Data Reviewed  Clinical lab tests: ordered and reviewed  Tests in the radiology section of CPT®: ordered and reviewed  Discuss the patient with other providers: yes      Labs Reviewed   COMPREHENSIVE METABOLIC PANEL - Abnormal; Notable for the following components:       Result Value    Glucose 130 (*)     Anion Gap 19.0 (*)     All other components within normal limits    Narrative:     GFR Normal >60  Chronic Kidney Disease <60  Kidney Failure <15     CBC WITH AUTO DIFFERENTIAL - Abnormal; Notable for the following components:    WBC 15.43 (*)     Hemoglobin 17.8 (*)     Hematocrit 53.2 (*)     Eosinophil % 6.7 (*)     Neutrophils, Absolute 7.73 (*)     Lymphocytes, Absolute 4.75 (*)     Monocytes, Absolute 1.69 (*)     Eosinophils, Absolute 1.03 (*)     Immature Grans, Absolute 0.08 (*)     All other components within normal limits   C-REACTIVE PROTEIN - Abnormal; Notable for the following components:    C-Reactive Protein 1.00 (*)     All other components within normal limits   LACTIC ACID, PLASMA - Abnormal; Notable for the following components:    Lactate 3.4 (*)     All other components within normal limits   BLOOD GAS, ARTERIAL - Abnormal; Notable for the following components:    pH, Arterial 7.294 (*)     pCO2, Arterial 58.8 (*)     HCO3, Arterial 28.5 (*)     All other components within normal limits   BLOOD GAS, ARTERIAL - Abnormal; Notable for the following components:    pH, Arterial 7.260 (*)     pCO2, Arterial 65.8 (*)     pO2, Arterial 439.0 (*)     HCO3, Arterial 29.5 (*)     O2 Saturation, Arterial >100.0 (*)     All other components within normal limits   RESPIRATORY PANEL, PCR - Normal  "   Narrative:     The coronavirus on the RVP is NOT COVID-19 and is NOT indicative of infection with COVID-19.    BNP (IN-HOUSE) - Normal    Narrative:     Among patients with dyspnea, NT-proBNP is highly sensitive for the detection of acute congestive heart failure. In addition NT-proBNP of <300 pg/ml effectively rules out acute congestive heart failure with 99% negative predictive value.    Results may be falsely decreased if patient taking Biotin.     TROPONIN (IN-HOUSE) - Normal    Narrative:     Troponin T Reference Range:  <= 0.03 ng/mL-   Negative for AMI  >0.03 ng/mL-     Abnormal for myocardial necrosis.  Clinicians would have to utilize clinical acumen, EKG, Troponin and serial changes to determine if it is an Acute Myocardial Infarction or myocardial injury due to an underlying chronic condition.       Results may be falsely decreased if patient taking Biotin.     LACTATE DEHYDROGENASE - Normal   PROCALCITONIN - Normal    Narrative:     As a Marker for Sepsis (Non-Neonates):   1. <0.5 ng/mL represents a low risk of severe sepsis and/or septic shock.  1. >2 ng/mL represents a high risk of severe sepsis and/or septic shock.    As a Marker for Lower Respiratory Tract Infections that require antibiotic therapy:  PCT on Admission     Antibiotic Therapy             6-12 Hrs later  > 0.5                Strongly Recommended            >0.25 - <0.5         Recommended  0.1 - 0.25           Discouraged                   Remeasure/reassess PCT  <0.1                 Strongly Discouraged          Remeasure/reassess PCT      As 28 day mortality risk marker: \"Change in Procalcitonin Result\" (> 80 % or <=80 %) if Day 0 (or Day 1) and Day 4 values are available. Refer to http://www.[employer]-pct-calculator.com/   Change in PCT <=80 %   A decrease of PCT levels below or equal to 80 % defines a positive change in PCT test result representing a higher risk for 28-day all-cause mortality of patients diagnosed with severe sepsis " or septic shock.  Change in PCT > 80 %   A decrease of PCT levels of more than 80 % defines a negative change in PCT result representing a lower risk for 28-day all-cause mortality of patients diagnosed with severe sepsis or septic shock.                Results may be falsely decreased if patient taking Biotin.    MAGNESIUM - Normal   BLOOD CULTURE   BLOOD CULTURE   SARS-COV-2 PCR ([place] IN-HOUSE PERFORMED), NP SWAB IN TRANSPORT MEDIA   RAINBOW DRAW    Narrative:     The following orders were created for panel order [place] Draw.  Procedure                               Abnormality         Status                     ---------                               -----------         ------                     Light Blue Top[[id number]]                                   Final result               Green Top (Gel)[[id number]]                                  Final result               Lavender Top[[id number]]                                     Final result               Gold Top - SST[[id number]]                                   Final result                 Please view results for these tests on the individual orders.   BLOOD GAS, ARTERIAL   LACTIC ACID REFLEX TIMER   LACTIC ACID, REFLEX   CBC AND DIFFERENTIAL    Narrative:     The following orders were created for panel order CBC & Differential.  Procedure                               Abnormality         Status                     ---------                               -----------         ------                     CBC Auto Differential[[id number]]        Abnormal            Final result                 Please view results for these tests on the individual orders.   LIGHT BLUE TOP   GREEN TOP   LAVENDER TOP   GOLD TOP - SST       Xr Chest 1 View    Result Date: 5/3/2020  Narrative: PROCEDURE: XR CHEST 1 VIEW Clinical History: SOA Triage Protocol Indication:  Same as above. Comparison:  4/12/2019. Technique: Single portable frontal projection of the chest was done.  Findings: The tip of the endotracheal tube is 6 cm above the tracheal bifurcation. The tip of the orogastric tube appears to be in the fundus of the stomach, although it is suboptimally visualized. Scattered soft tissue calcifications in the lower neck and the both sides of the chest and bilateral arms are again noted. There are no discrete airspace infiltrates, pneumothoraces or pleural effusions. The pulmonary vascularity is normal. The cardiomediastinal contour is  unremarkable.     Impression: Impression: The tip of the endotracheal tube is 6 cm above the tracheal bifurcation. The tip of the orogastric tube appears to be in the fundus of the stomach, although it is suboptimally visualized. There is no acute pleural-parenchymal process seen in the imaged lung fields. Electronically signed by:  Kristian Hanks MD  5/3/2020 8:31 PM CDT Workstation: [id number]          Final diagnoses:   Acute respiratory failure with hypoxia and hypercapnia (CMS/HCC)   Pneumonia of both lower lobes due to infectious organism (CMS/HCC)   Sepsis, due to unspecified organism, unspecified whether acute organ dysfunction present (CMS/HCC)            Declan Darden MD  05/03/20 1095

## 2020-05-04 NOTE — H&P
[facility] Medicine Admission      Date of Admission: 5/3/2020      Primary Care Physician: Arvin Ness MD      Chief Complaint:  Shortness of breath    HPI: Patient is a 59-year-old  male past medical history of COPD who presented to the emergency department with shortness of breath.  In the emergency department he was noted to be markedly hypoxic and dyspneic.  He was initially placed on a nonrebreather and given steroids and bronchodilators.  This did not alleviate his symptoms.  He required tracheal intubation and mechanical ventilation.  Patient was intubated prior to my interview and all of his history comes from the medical record.    Concurrent Medical History:  has a past medical history of Asthma and Erysipelas of lower extremity.    Past Surgical History:  has a past surgical history that includes Rotator cuff repair (Bilateral, 2008) and Incision and Drainage Abscess.    Family History: family history includes Arrhythmia in his sister; Coronary artery disease in his brother; Heart attack in his brother.     Social History:  reports that he has never smoked. He has never used smokeless tobacco. He reports that he drinks alcohol.    Allergies:   Allergies   Allergen Reactions   • Shellfish-Derived Products        Medications:   Prior to Admission medications    Medication Sig Start Date End Date Taking? Authorizing Provider   albuterol (ACCUNEB) 1.25 MG/3ML nebulizer solution Take 3 mL by nebulization Every 6 (Six) Hours As Needed for Wheezing. 4/12/19   Scott Almanza MD   albuterol sulfate  (90 Base) MCG/ACT inhaler Inhale 2 puffs Every 6 (Six) Hours As Needed for Wheezing. 4/12/19   Scott Almanza MD       Review of Systems:  Review of Systems   Unable to perform ROS: Intubated     Physical Exam:   Temp:  [98.5 °F (36.9 °C)-99.3 °F (37.4 °C)] 99.3 °F (37.4 °C)  Heart Rate:  [] 94  Resp:  [20-45] 20  BP: (128-231)/()  155/92  FiO2 (%):  [30 %-100 %] 30 %     Physical Exam   Constitutional: He appears well-developed and well-nourished. He is sedated and intubated.   HENT:   Head: Normocephalic and atraumatic.   Nose: Nose normal.   Mouth/Throat: Oropharynx is clear and moist.   Eyes: Pupils are equal, round, and reactive to light. Conjunctivae, EOM and lids are normal. No scleral icterus.   Neck: Normal range of motion. Neck supple. No JVD present. No tracheal tenderness and no spinous process tenderness present. No neck rigidity. No tracheal deviation, no edema and normal range of motion present.   Cardiovascular: Normal rate, regular rhythm, S1 normal, S2 normal, normal heart sounds and normal pulses. Exam reveals no gallop and no friction rub.   No murmur heard.  Pulmonary/Chest: Effort normal and breath sounds normal. No accessory muscle usage. He is intubated. No respiratory distress. He has no decreased breath sounds. He has no wheezes. He has no rales. He exhibits no tenderness.   Abdominal: Soft. Bowel sounds are normal. He exhibits no distension and no mass. There is no tenderness. There is no rebound and no guarding.   Musculoskeletal: He exhibits no edema or tenderness.   Neurological:   Intubated and sedated   Skin: Skin is warm. No rash noted. No pallor.        Psychiatric:   Intubated and sedated         Results Reviewed:  I have personally reviewed current lab, radiology, and data and agree with results.  Lab Results (last 24 hours)     Procedure Component Value Units Date/Time    Respiratory Panel, PCR - Swab, Nasopharynx [[id number]] Collected:  05/03/20 2032    Specimen:  Swab from Nasopharynx Updated:  05/03/20 2035    SARS-COV-2 PCR ([facility] IN-HOUSE PERFORMED), NP SWAB IN TRANSPORT MEDIA - Swab, Nasopharynx [[id number]] Collected:  05/03/20 2032    Specimen:  Swab from Nasopharynx Updated:  05/03/20 2035    Blood Gas, Arterial [[id number]]  (Abnormal) Collected:  05/03/20 2025    Specimen:  Arterial Blood  Updated:  05/03/20 2032     Site Arterial Line     Jakub's Test Positive     pH, Arterial 7.260 pH units      Comment: 84 Value below reference range        pCO2, Arterial 65.8 mm Hg      Comment: 83 Value above reference range        pO2, Arterial 439.0 mm Hg      Comment: 83 Value above reference range        HCO3, Arterial 29.5 mmol/L      Comment: 83 Value above reference range        Base Excess, Arterial 0.1 mmol/L      O2 Saturation, Arterial >100.0 %      Comment: 93 Value above reportable range > 100.0        Barometric Pressure for Blood Gas 746 mmHg      Modality Ventilator     FIO2 100 %      Ventilator Mode AC     Set Tidal Volume 500     Set Mech Resp Rate 18.0     PEEP 5.0     Collected by CHIKIS MOSES     Comment: Meter: [id number]     :  [id number]       Blood Culture - Blood, Arm, Right [[id number]] Collected:  05/03/20 2027    Specimen:  Blood from Arm, Right Updated:  05/03/20 2031    Byron Draw [[id number]] Collected:  05/03/20 1920    Specimen:  Blood Updated:  05/03/20 2030    Narrative:       The following orders were created for panel order Byron Draw.  Procedure                               Abnormality         Status                     ---------                               -----------         ------                     Light Blue Top[[id number]]                                   Final result               Green Top (Gel)[[id number]]                                  Final result               Lavender Top[[id number]]                                     Final result               Gold Top - SST[[id number]]                                   Final result                 Please view results for these tests on the individual orders.    Light Blue Top [[id number]] Collected:  05/03/20 1920    Specimen:  Blood Updated:  05/03/20 2030     Extra Tube hold for add-on     Comment: Auto resulted       Green Top (Gel) [[id number]] Collected:  05/03/20 1920    Specimen:  Blood Updated:  05/03/20  2030     Extra Tube Hold for add-ons.     Comment: Auto resulted.       Lavender Top [[id number]] Collected:  05/03/20 1920    Specimen:  Blood Updated:  05/03/20 2030     Extra Tube hold for add-on     Comment: Auto resulted       Gold Top - SST [[id number]] Collected:  05/03/20 1920    Specimen:  Blood Updated:  05/03/20 2030     Extra Tube Hold for add-ons.     Comment: Auto resulted.       Comprehensive Metabolic Panel [[id number]]  (Abnormal) Collected:  05/03/20 1920    Specimen:  Blood Updated:  05/03/20 2008     Glucose 130 mg/dL      BUN 10 mg/dL      Creatinine 1.07 mg/dL      Sodium 145 mmol/L      Potassium 4.3 mmol/L      Chloride 102 mmol/L      CO2 24.0 mmol/L      Calcium 9.5 mg/dL      Total Protein 8.0 g/dL      Albumin 4.10 g/dL      ALT (SGPT) 27 U/L      AST (SGOT) 27 U/L      Alkaline Phosphatase 99 U/L      Total Bilirubin 0.4 mg/dL      eGFR Non African Amer 71 mL/min/1.73      Globulin 3.9 gm/dL      A/G Ratio 1.1 g/dL      BUN/Creatinine Ratio 9.3     Anion Gap 19.0 mmol/L     Narrative:       GFR Normal >60  Chronic Kidney Disease <60  Kidney Failure <15      Lactate Dehydrogenase [[id number]]  (Normal) Collected:  05/03/20 1920    Specimen:  Blood Updated:  05/03/20 2008      U/L     C-reactive Protein [[id number]]  (Abnormal) Collected:  05/03/20 1920    Specimen:  Blood Updated:  05/03/20 2008     C-Reactive Protein 1.00 mg/dL     Magnesium [[id number]]  (Normal) Collected:  05/03/20 1920    Specimen:  Blood Updated:  05/03/20 2008     Magnesium 2.2 mg/dL     Lactic Acid, Plasma [[id number]]  (Abnormal) Collected:  05/03/20 1930    Specimen:  Blood from Arm, Right Updated:  05/03/20 1959     Lactate 3.4 mmol/L     Lactic Acid, Reflex Timer (This will reflex a repeat order 3-3:15 hours after ordered.) [[id number]] Collected:  05/03/20 1930    Specimen:  Blood from Arm, Right Updated:  05/03/20 1958    Troponin [[id number]]  (Normal) Collected:  05/03/20 1920    Specimen:  Blood Updated:   "05/03/20 1957     Troponin T <0.010 ng/mL     Narrative:       Troponin T Reference Range:  <= 0.03 ng/mL-   Negative for AMI  >0.03 ng/mL-     Abnormal for myocardial necrosis.  Clinicians would have to utilize clinical acumen, EKG, Troponin and serial changes to determine if it is an Acute Myocardial Infarction or myocardial injury due to an underlying chronic condition.       Results may be falsely decreased if patient taking Biotin.      BNP [[id number]]  (Normal) Collected:  05/03/20 1920    Specimen:  Blood Updated:  05/03/20 1956     proBNP 66.8 pg/mL     Narrative:       Among patients with dyspnea, NT-proBNP is highly sensitive for the detection of acute congestive heart failure. In addition NT-proBNP of <300 pg/ml effectively rules out acute congestive heart failure with 99% negative predictive value.    Results may be falsely decreased if patient taking Biotin.      Procalcitonin [[id number]]  (Normal) Collected:  05/03/20 1920    Specimen:  Blood Updated:  05/03/20 1956     Procalcitonin 0.16 ng/mL     Narrative:       As a Marker for Sepsis (Non-Neonates):   1. <0.5 ng/mL represents a low risk of severe sepsis and/or septic shock.  1. >2 ng/mL represents a high risk of severe sepsis and/or septic shock.    As a Marker for Lower Respiratory Tract Infections that require antibiotic therapy:  PCT on Admission     Antibiotic Therapy             6-12 Hrs later  > 0.5                Strongly Recommended            >0.25 - <0.5         Recommended  0.1 - 0.25           Discouraged                   Remeasure/reassess PCT  <0.1                 Strongly Discouraged          Remeasure/reassess PCT      As 28 day mortality risk marker: \"Change in Procalcitonin Result\" (> 80 % or <=80 %) if Day 0 (or Day 1) and Day 4 values are available. Refer to http://www.BTIGs-pct-calculator.com/   Change in PCT <=80 %   A decrease of PCT levels below or equal to 80 % defines a positive change in PCT test result representing a " higher risk for 28-day all-cause mortality of patients diagnosed with severe sepsis or septic shock.  Change in PCT > 80 %   A decrease of PCT levels of more than 80 % defines a negative change in PCT result representing a lower risk for 28-day all-cause mortality of patients diagnosed with severe sepsis or septic shock.                Results may be falsely decreased if patient taking Biotin.     Blood Gas, Arterial [[id number]]  (Abnormal) Collected:  05/03/20 1926    Specimen:  Arterial Blood Updated:  05/03/20 1936     Site Arterial Line     Jakub's Test Positive     pH, Arterial 7.294 pH units      Comment: 84 Value below reference range        pCO2, Arterial 58.8 mm Hg      Comment: 83 Value above reference range        pO2, Arterial 87.4 mm Hg      HCO3, Arterial 28.5 mmol/L      Comment: 83 Value above reference range        Base Excess, Arterial 0.1 mmol/L      O2 Saturation, Arterial 95.7 %      Barometric Pressure for Blood Gas 745 mmHg      Modality NRB     Ventilator Mode NA     Collected by CHIKIS MOSES     Comment: Meter: [id number]     :  [id number]       Blood Culture - Blood, Arm, Right [[id number]] Collected:  05/03/20 1930    Specimen:  Blood from Arm, Right Updated:  05/03/20 1933    CBC & Differential [[id number]] Collected:  05/03/20 1920    Specimen:  Blood Updated:  05/03/20 1930    Narrative:       The following orders were created for panel order CBC & Differential.  Procedure                               Abnormality         Status                     ---------                               -----------         ------                     CBC Auto Differential[[id number]]        Abnormal            Final result                 Please view results for these tests on the individual orders.    CBC Auto Differential [[id number]]  (Abnormal) Collected:  05/03/20 1920    Specimen:  Blood Updated:  05/03/20 1930     WBC 15.43 10*3/mm3      RBC 5.67 10*6/mm3      Hemoglobin 17.8 g/dL       Hematocrit 53.2 %      MCV 93.8 fL      MCH 31.4 pg      MCHC 33.5 g/dL      RDW 13.4 %      RDW-SD 45.8 fl      MPV 10.2 fL      Platelets 407 10*3/mm3      Neutrophil % 50.0 %      Lymphocyte % 30.8 %      Monocyte % 11.0 %      Eosinophil % 6.7 %      Basophil % 1.0 %      Immature Grans % 0.5 %      Neutrophils, Absolute 7.73 10*3/mm3      Lymphocytes, Absolute 4.75 10*3/mm3      Monocytes, Absolute 1.69 10*3/mm3      Eosinophils, Absolute 1.03 10*3/mm3      Basophils, Absolute 0.15 10*3/mm3      Immature Grans, Absolute 0.08 10*3/mm3      nRBC 0.0 /100 WBC         Imaging Results (Last 24 Hours)     Procedure Component Value Units Date/Time    XR Chest 1 View [[id number]] Collected:  05/03/20 2001     Updated:  05/03/20 2032    Narrative:       PROCEDURE: XR CHEST 1 VIEW    Clinical History: [facility]A Triage Protocol    Indication:     Same as above.    Comparison:     4/12/2019.    Technique:     Single portable frontal projection of the chest was done.    Findings:    The tip of the endotracheal tube is 6 cm above the tracheal  bifurcation. The tip of the orogastric tube appears to be in the  fundus of the stomach, although it is suboptimally visualized.    Scattered soft tissue calcifications in the lower neck and the  both sides of the chest and bilateral arms are again noted.    There are no discrete airspace infiltrates, pneumothoraces or  pleural effusions.    The pulmonary vascularity is normal.    The cardiomediastinal contour is  unremarkable.      Impression:       Impression:   The tip of the endotracheal tube is 6 cm above the tracheal  bifurcation.   The tip of the orogastric tube appears to be in the fundus of the  stomach, although it is suboptimally visualized.  There is no acute pleural-parenchymal process seen in the imaged  lung fields.    Electronically signed by:  Kristian Hanks MD  5/3/2020 8:31 PM CDT  Workstation: [id number]            Assessment:    Active Hospital Problems    Diagnosis      • Acute respiratory failure with hypoxia and hypercapnia (CMS/HCC)             Plan:  1.  Acute hypoxic and hypercapnic respiratory failure: Differential diagnosis includes COVID-19 pneumonia, pulmonary embolism, and less likely bacterial infection.  Patient will be placed and enhanced droplet precautions, COVID testing will be done, full dose Lovenox will be given, steroids will be given, bronchodilators will be given, and cultures will be done.  Procalcitonin is normal, so we will hold on antibiotics for now.  2.  Combined respiratory and metabolic acidosis: Patient was hydrated the emergency department.  Continue mechanical ventilation.  Recheck ABG in the morning.  3.  History of erysipelas  4.  DVT prophylaxis: Therapeutic Lovenox.    Approximately 45 minutes of critical care time were spent managing the patient exclusive of billable procedures.     The patient was evaluated during the global COVID-19 pandemic, and the diagnosis was suspected/considered upon their initial presentation.  Evaluation, treatment, and testing was consistent with current guidelines for patients who present with complaints or symptoms that may be related to COVID-19.    I discussed the patient's findings and my recommendations with: Nursing        This document has been electronically signed by Raul Eddy MD on May 3, 2020 20:59

## 2020-05-04 NOTE — PLAN OF CARE
Problem: Ventilation, Mechanical Invasive (Adult)  Goal: Signs and Symptoms of Listed Potential Problems Will be Absent, Minimized or Managed (Ventilation, Mechanical Invasive)  Outcome: Ongoing (interventions implemented as appropriate)  Flowsheets (Taken 5/4/2020 1232)  Problems Assessed (Mechanical Ventilation, Invasive): inability to wean; malnutrition  Problems Present (Mech Vent, Invasive): inability to wean     Problem: Patient Care Overview  Goal: Plan of Care Review  Outcome: Ongoing (interventions implemented as appropriate)  Flowsheets (Taken 5/4/2020 1232)  Progress: no change  Plan of Care Reviewed With: caregiver  Outcome Summary: New Assessment:  Pt  is currently NPO on the vent due to resp failure with Pnemonia.  RD will start EN--Peptamen VHP with goal rate of 45cc/hr.  Will monitor.

## 2020-05-05 ENCOUNTER — APPOINTMENT (OUTPATIENT)
Dept: GENERAL RADIOLOGY | Facility: HOSPITAL | Age: 60
End: 2020-05-05

## 2020-05-05 LAB
ANION GAP SERPL CALCULATED.3IONS-SCNC: 14 MMOL/L (ref 5–15)
ARTERIAL PATENCY WRIST A: ABNORMAL
ATMOSPHERIC PRESS: 741 MMHG
BASE EXCESS BLDA CALC-SCNC: 2 MMOL/L (ref 0–2)
BASOPHILS # BLD AUTO: 0.06 10*3/MM3 (ref 0–0.2)
BASOPHILS NFR BLD AUTO: 0.5 % (ref 0–1.5)
BDY SITE: ABNORMAL
BUN BLD-MCNC: 17 MG/DL (ref 6–20)
BUN/CREAT SERPL: 23 (ref 7–25)
CALCIUM SPEC-SCNC: 8.9 MG/DL (ref 8.6–10.5)
CHLORIDE SERPL-SCNC: 105 MMOL/L (ref 98–107)
CO2 SERPL-SCNC: 24 MMOL/L (ref 22–29)
CREAT BLD-MCNC: 0.74 MG/DL (ref 0.76–1.27)
DEPRECATED RDW RBC AUTO: 46.5 FL (ref 37–54)
EOSINOPHIL # BLD AUTO: 0.07 10*3/MM3 (ref 0–0.4)
EOSINOPHIL NFR BLD AUTO: 0.6 % (ref 0.3–6.2)
ERYTHROCYTE [DISTWIDTH] IN BLOOD BY AUTOMATED COUNT: 13.7 % (ref 12.3–15.4)
GFR SERPL CREATININE-BSD FRML MDRD: 109 ML/MIN/1.73
GLUCOSE BLD-MCNC: 102 MG/DL (ref 65–99)
GLUCOSE BLDC GLUCOMTR-MCNC: 122 MG/DL (ref 70–130)
GLUCOSE BLDC GLUCOMTR-MCNC: 70 MG/DL (ref 70–130)
GLUCOSE BLDC GLUCOMTR-MCNC: 71 MG/DL (ref 70–130)
HCO3 BLDA-SCNC: 26.6 MMOL/L (ref 20–26)
HCT VFR BLD AUTO: 46.8 % (ref 37.5–51)
HGB BLD-MCNC: 15.9 G/DL (ref 13–17.7)
HOROWITZ INDEX BLD+IHG-RTO: 30 %
IMM GRANULOCYTES # BLD AUTO: 0.07 10*3/MM3 (ref 0–0.05)
IMM GRANULOCYTES NFR BLD AUTO: 0.6 % (ref 0–0.5)
LYMPHOCYTES # BLD AUTO: 2.07 10*3/MM3 (ref 0.7–3.1)
LYMPHOCYTES NFR BLD AUTO: 18.8 % (ref 19.6–45.3)
Lab: ABNORMAL
MCH RBC QN AUTO: 31.5 PG (ref 26.6–33)
MCHC RBC AUTO-ENTMCNC: 34 G/DL (ref 31.5–35.7)
MCV RBC AUTO: 92.9 FL (ref 79–97)
MODALITY: ABNORMAL
MONOCYTES # BLD AUTO: 1.24 10*3/MM3 (ref 0.1–0.9)
MONOCYTES NFR BLD AUTO: 11.3 % (ref 5–12)
NEUTROPHILS # BLD AUTO: 7.5 10*3/MM3 (ref 1.7–7)
NEUTROPHILS NFR BLD AUTO: 68.2 % (ref 42.7–76)
NRBC BLD AUTO-RTO: 0 /100 WBC (ref 0–0.2)
PCO2 BLDA: 40.4 MM HG (ref 35–45)
PEEP RESPIRATORY: 5 CM[H2O]
PH BLDA: 7.43 PH UNITS (ref 7.35–7.45)
PLATELET # BLD AUTO: 233 10*3/MM3 (ref 140–450)
PMV BLD AUTO: 10.4 FL (ref 6–12)
PO2 BLDA: 93.9 MM HG (ref 83–108)
POTASSIUM BLD-SCNC: 3.7 MMOL/L (ref 3.5–5.2)
RBC # BLD AUTO: 5.04 10*6/MM3 (ref 4.14–5.8)
SAO2 % BLDCOA: 98 % (ref 94–99)
SARS-COV-2 RNA RESP QL NAA+PROBE: NOT DETECTED
SET MECH RESP RATE: 20
SODIUM BLD-SCNC: 143 MMOL/L (ref 136–145)
VENTILATOR MODE: AC
VT ON VENT VENT: 500 ML
WBC NRBC COR # BLD: 11.01 10*3/MM3 (ref 3.4–10.8)

## 2020-05-05 PROCEDURE — 25010000002 PROPOFOL 10 MG/ML EMULSION: Performed by: HOSPITALIST

## 2020-05-05 PROCEDURE — 94799 UNLISTED PULMONARY SVC/PX: CPT

## 2020-05-05 PROCEDURE — 82803 BLOOD GASES ANY COMBINATION: CPT

## 2020-05-05 PROCEDURE — 25010000002 FENTANYL CITRATE (PF) 100 MCG/2ML SOLUTION: Performed by: INTERNAL MEDICINE

## 2020-05-05 PROCEDURE — 82962 GLUCOSE BLOOD TEST: CPT

## 2020-05-05 PROCEDURE — 80048 BASIC METABOLIC PNL TOTAL CA: CPT | Performed by: HOSPITALIST

## 2020-05-05 PROCEDURE — 25010000002 FUROSEMIDE PER 20 MG: Performed by: INTERNAL MEDICINE

## 2020-05-05 PROCEDURE — 94003 VENT MGMT INPAT SUBQ DAY: CPT

## 2020-05-05 PROCEDURE — 74018 RADEX ABDOMEN 1 VIEW: CPT

## 2020-05-05 PROCEDURE — 85025 COMPLETE CBC W/AUTO DIFF WBC: CPT | Performed by: HOSPITALIST

## 2020-05-05 PROCEDURE — 25010000002 ENOXAPARIN PER 10 MG: Performed by: INTERNAL MEDICINE

## 2020-05-05 PROCEDURE — 36600 WITHDRAWAL OF ARTERIAL BLOOD: CPT

## 2020-05-05 RX ORDER — SODIUM CHLORIDE 9 MG/ML
100 INJECTION, SOLUTION INTRAVENOUS CONTINUOUS
Status: DISCONTINUED | OUTPATIENT
Start: 2020-05-05 | End: 2020-05-05

## 2020-05-05 RX ORDER — FUROSEMIDE 10 MG/ML
40 INJECTION INTRAMUSCULAR; INTRAVENOUS ONCE
Status: COMPLETED | OUTPATIENT
Start: 2020-05-05 | End: 2020-05-05

## 2020-05-05 RX ORDER — FENTANYL CITRATE 50 UG/ML
25 INJECTION, SOLUTION INTRAMUSCULAR; INTRAVENOUS
Status: DISCONTINUED | OUTPATIENT
Start: 2020-05-05 | End: 2020-05-08 | Stop reason: HOSPADM

## 2020-05-05 RX ORDER — BISACODYL 10 MG
10 SUPPOSITORY, RECTAL RECTAL DAILY PRN
Status: DISCONTINUED | OUTPATIENT
Start: 2020-05-05 | End: 2020-05-08 | Stop reason: HOSPADM

## 2020-05-05 RX ADMIN — BISACODYL 10 MG: 10 SUPPOSITORY RECTAL at 16:28

## 2020-05-05 RX ADMIN — ALBUTEROL SULFATE 2 PUFF: 90 AEROSOL, METERED RESPIRATORY (INHALATION) at 19:36

## 2020-05-05 RX ADMIN — PROPOFOL 65 MCG/KG/MIN: 10 INJECTION, EMULSION INTRAVENOUS at 01:20

## 2020-05-05 RX ADMIN — ALBUTEROL SULFATE 2 PUFF: 90 AEROSOL, METERED RESPIRATORY (INHALATION) at 13:16

## 2020-05-05 RX ADMIN — ALBUTEROL SULFATE 2 PUFF: 90 AEROSOL, METERED RESPIRATORY (INHALATION) at 16:14

## 2020-05-05 RX ADMIN — CHLORHEXIDINE GLUCONATE 0.12% ORAL RINSE 15 ML: 1.2 LIQUID ORAL at 08:42

## 2020-05-05 RX ADMIN — FENTANYL CITRATE 25 MCG: 50 INJECTION, SOLUTION INTRAMUSCULAR; INTRAVENOUS at 16:13

## 2020-05-05 RX ADMIN — FENTANYL CITRATE 25 MCG: 50 INJECTION, SOLUTION INTRAMUSCULAR; INTRAVENOUS at 11:07

## 2020-05-05 RX ADMIN — ALBUTEROL SULFATE 2 PUFF: 90 AEROSOL, METERED RESPIRATORY (INHALATION) at 07:28

## 2020-05-05 RX ADMIN — ENOXAPARIN SODIUM 40 MG: 40 INJECTION SUBCUTANEOUS at 20:35

## 2020-05-05 RX ADMIN — FENTANYL CITRATE 25 MCG: 50 INJECTION, SOLUTION INTRAMUSCULAR; INTRAVENOUS at 20:35

## 2020-05-05 RX ADMIN — FAMOTIDINE 20 MG: 10 INJECTION INTRAVENOUS at 08:43

## 2020-05-05 RX ADMIN — ALBUTEROL SULFATE 2 PUFF: 90 AEROSOL, METERED RESPIRATORY (INHALATION) at 00:08

## 2020-05-05 RX ADMIN — SODIUM CHLORIDE 100 ML/HR: 9 INJECTION, SOLUTION INTRAVENOUS at 01:47

## 2020-05-05 RX ADMIN — FUROSEMIDE 40 MG: 10 INJECTION, SOLUTION INTRAMUSCULAR; INTRAVENOUS at 14:15

## 2020-05-05 RX ADMIN — SODIUM CHLORIDE, PRESERVATIVE FREE 10 ML: 5 INJECTION INTRAVENOUS at 20:39

## 2020-05-05 RX ADMIN — ENOXAPARIN SODIUM 40 MG: 40 INJECTION SUBCUTANEOUS at 08:42

## 2020-05-05 RX ADMIN — FAMOTIDINE 20 MG: 10 INJECTION INTRAVENOUS at 20:35

## 2020-05-05 RX ADMIN — CHLORHEXIDINE GLUCONATE 0.12% ORAL RINSE 15 ML: 1.2 LIQUID ORAL at 20:35

## 2020-05-05 RX ADMIN — PROPOFOL 90 MCG/KG/MIN: 10 INJECTION, EMULSION INTRAVENOUS at 21:20

## 2020-05-05 RX ADMIN — FENTANYL CITRATE 25 MCG: 50 INJECTION, SOLUTION INTRAMUSCULAR; INTRAVENOUS at 12:59

## 2020-05-05 RX ADMIN — PROPOFOL 70 MCG/KG/MIN: 10 INJECTION, EMULSION INTRAVENOUS at 14:51

## 2020-05-05 RX ADMIN — PROPOFOL 60 MCG/KG/MIN: 10 INJECTION, EMULSION INTRAVENOUS at 17:55

## 2020-05-05 RX ADMIN — PROPOFOL 70 MCG/KG/MIN: 10 INJECTION, EMULSION INTRAVENOUS at 05:04

## 2020-05-05 RX ADMIN — SODIUM CHLORIDE 100 ML/HR: 9 INJECTION, SOLUTION INTRAVENOUS at 07:20

## 2020-05-05 RX ADMIN — ALBUTEROL SULFATE 2 PUFF: 90 AEROSOL, METERED RESPIRATORY (INHALATION) at 23:32

## 2020-05-05 RX ADMIN — SODIUM CHLORIDE, PRESERVATIVE FREE 10 ML: 5 INJECTION INTRAVENOUS at 08:43

## 2020-05-05 NOTE — PLAN OF CARE
Patient sedated and on ventilator overnight, hemodynamically stable.  Patient's urine output decreased at approximately 1am and has since responded to IVF that was started at 100ml/hr while patient advances to tube feeding goal.  SBT attempted this morning and had to be stopped due to increased agitation.

## 2020-05-05 NOTE — PLAN OF CARE
Problem: Patient Care Overview  Goal: Plan of Care Review  Outcome: Ongoing (interventions implemented as appropriate)  Flowsheets (Taken 5/4/2020 2225)  Progress: no change

## 2020-05-05 NOTE — PROGRESS NOTES
[facility] Medicine Services  INPATIENT PROGRESS NOTE    Length of Stay: 2  Date of Admission: 5/3/2020  Primary Care Physician: Arvin Ness MD    Subjective   Chief Complaint: Respiratory failure.    HPI:  Patient is a 59-year-old  male past medical history of COPD who presented to the emergency department with shortness of breath.  In the emergency department he was noted to be markedly hypoxic and dyspneic.  He was initially placed on a nonrebreather and given steroids and bronchodilators.  This did not alleviate his symptoms.  He required tracheal intubation and mechanical ventilation.    Patient is seen for follow-up today.  He remains intubated, sedated, restrained and failed SBT today.  Mild abdominal distention has been reported but patient does not have any residual with NG tube feeding.    Review of Systems  Unable to review as the patient is intubated and sedated.  Objective    Heart Rate:  [] 61  Resp:  [19-27] 19  BP: ()/(55-88) 112/71  FiO2 (%):  [30 %] 30 %    Vent Settings:  FiO2 (%):  [30 %] 30 %  S RR:  [20] 20  PEEP/CPAP (cm H2O):  [5 cm H20] 5 cm H20  [place] SUP:  [0 cm H20-8 cm H20] 8 cm H20  MAP (cm H2O):  [12.9-25] 15    Physical Exam   Constitutional: He appears well-developed and well-nourished. No distress. He is sedated, intubated and restrained.   HENT:   Head: Normocephalic and atraumatic.   Eyes: No scleral icterus.   Neck: Neck supple. No JVD present. No thyromegaly present.   Cardiovascular: Normal rate, regular rhythm and normal heart sounds. Exam reveals no gallop and no friction rub.   No murmur heard.  Pulmonary/Chest: Effort normal. He is intubated. He has decreased breath sounds. He has no wheezes. He has rhonchi. He has no rales. He exhibits no tenderness.   Abdominal: Soft. He exhibits distension. He exhibits no mass. Bowel sounds are decreased. There is no tenderness. There is no rebound and no guarding.      Musculoskeletal: He exhibits no edema, tenderness or deformity.   Neurological: He exhibits normal muscle tone.   Skin: Skin is warm and dry. No rash noted. He is not diaphoretic. No erythema. No pallor.   Nursing note and vitals reviewed.        Medication Review:    Current Facility-Administered Medications:   •  albuterol sulfate HFA (PROVENTIL HFA;VENTOLIN HFA;PROAIR HFA) inhaler 2 puff, 2 puff, Inhalation, Q4H - RT, Raul Eddy MD, 2 puff at 05/05/20 0728  •  chlorhexidine (PERIDEX) 0.12 % solution 15 mL, 15 mL, Mouth/Throat, Q12H, Raul Eddy MD, 15 mL at 05/05/20 0842  •  dextrose (D50W) 25 g/ 50mL Intravenous Solution 25 g, 25 g, Intravenous, Q15 Min PRN, Raul Eddy MD  •  dextrose (GLUTOSE) oral gel 15 g, 15 g, Oral, Q15 Min PRN, Raul Eddy MD  •  enoxaparin (LOVENOX) syringe 40 mg, 40 mg, Subcutaneous, Q12H, Abhi Mccartney MD, 40 mg at 05/05/20 0842  •  famotidine (PEPCID) injection 20 mg, 20 mg, Intravenous, Q12H, Raul Eddy MD, 20 mg at 05/05/20 0843  •  fentaNYL citrate (PF) (SUBLIMAZE) injection 25 mcg, 25 mcg, Intravenous, Q2H PRN, Abhi Mccartney MD  •  glucagon (human recombinant) (GLUCAGEN DIAGNOSTIC) injection 1 mg, 1 mg, Subcutaneous, Q15 Min PRN, Raul Eddy MD  •  insulin aspart (novoLOG) injection 0-9 Units, 0-9 Units, Subcutaneous, TID AC, Raul Eddy MD  •  midazolam (VERSED) 2 MG/2ML injection  - ADS Override Pull, , , ,   •  midazolam (VERSED) injection 2 mg, 2 mg, Intravenous, Once, Raul Eddy MD  •  propofol (DIPRIVAN) infusion 10 mg/mL 100 mL, 5-100 mcg/kg/min, Intravenous, Titrated, Raul Eddy MD, Last Rate: 36 mL/hr at 05/05/20 0803, 80 mcg/kg/min at 05/05/20 0803  •  sodium chloride 0.9 % flush 10 mL, 10 mL, Intravenous, PRN, Raul Eddy MD  •  sodium chloride 0.9 % flush 10 mL, 10 mL, Intravenous, Q12H, Raul Eddy MD, 10 mL at 05/05/20 0843  •  sodium chloride 0.9 % flush 10 mL, 10 mL,  Intravenous, PRN, Raul Eddy MD  •  sodium chloride 0.9 % infusion, 100 mL/hr, Intravenous, Continuous, Robin Polanco DO, Last Rate: 100 mL/hr at 05/05/20 0720, 100 mL/hr at 05/05/20 0720    Results Review:  I have reviewed the labs, radiology results, and diagnostic studies.    Laboratory Data:   Results from last 7 days   Lab Units 05/05/20  0637 05/04/20  0302 05/03/20  1920   SODIUM mmol/L 143 144 145   POTASSIUM mmol/L 3.7 3.8 4.3   CHLORIDE mmol/L 105 107 102   CO2 mmol/L 24.0 23.0 24.0   BUN mg/dL 17 12 10   CREATININE mg/dL 0.74* 0.68* 1.07   GLUCOSE mg/dL 102* 169* 130*   CALCIUM mg/dL 8.9 9.0 9.5   BILIRUBIN mg/dL  --   --  0.4   ALK PHOS U/L  --   --  99   ALT (SGPT) U/L  --   --  27   AST (SGOT) U/L  --   --  27   ANION GAP mmol/L 14.0 14.0 19.0*     Estimated Creatinine Clearance: 103.6 mL/min (A) (by C-G formula based on SCr of 0.74 mg/dL (L)).  Results from last 7 days   Lab Units 05/03/20  1920   MAGNESIUM mg/dL 2.2         Results from last 7 days   Lab Units 05/05/20  0636 05/04/20  0302 05/03/20  1920   WBC 10*3/mm3 11.01* 8.37 15.43*   HEMOGLOBIN g/dL 15.9 16.1 17.8*   HEMATOCRIT % 46.8 47.2 53.2*   PLATELETS 10*3/mm3 233 292 407           Culture Data:   Blood Culture   Date Value Ref Range Status   05/03/2020 No growth at 24 hours  Preliminary   05/03/2020 No growth at 24 hours  Preliminary     No results found for: URINECX  No results found for: RESPCX  No results found for: WOUNDCX  No results found for: STOOLCX  No components found for: BODYFLD    Radiology Data:   Imaging Results (Last 24 Hours)     Procedure Component Value Units Date/Time    XR Abdomen KUB [[id number]] Resulted:  05/05/20 1001     Updated:  05/05/20 1002          ABG:  Results from last 7 days   Lab Units 05/05/20  0730   PH, ARTERIAL pH units 7.427   PO2 ART mm Hg 93.9   PCO2, ARTERIAL mm Hg 40.4   HCO3 ART mmol/L 26.6*       I have reviewed the patient's current medications.     Assessment/Plan          Hospital Problem List:  Active Problems:    Acute respiratory failure with hypoxia and hypercapnia (CMS/HCC)  Likely secondary to COPD exacerbation and to rule out COVID-19 viral infection: Patient's chest x-ray did not show any acute processes and COVID-19 PCR was negative.  Continue ventilatory support, empiric antimicrobial therapy, bronchodilators and daily SBT's.  Consult pulmonary if the need arises.    Sepsis due to possible viral pneumonia: Patient did screen positive for sepsis.  Continue antimicrobial therapy.  Blood cultures show no growth and respiratory PCR was unremarkable. Lactic acidosis resolved.     Nutrition: NG tube feedings currently on hold secondary to mild abdominal distention.  Will check KUB.    Continue GI and DVT prophylaxis.    Update was given to patient's spouse Mariola Diop.    More than 50 minutes of critical care time was spent in the assessment and formulation of treatment plan for this patient excluding billable procedures.    The patient was evaluated during the global COVID-19 pandemic, and the diagnosis was suspected/considered upon their initial presentation.  Evaluation, treatment, and testing was consistent with current guidelines for patients who present with complaints or symptoms that may be related to COVID-19.      Discharge Planning: In progress.    Abhi Mccartney MD   05/05/20   10:27

## 2020-05-06 ENCOUNTER — APPOINTMENT (OUTPATIENT)
Dept: GENERAL RADIOLOGY | Facility: HOSPITAL | Age: 60
End: 2020-05-06

## 2020-05-06 LAB
ANION GAP SERPL CALCULATED.3IONS-SCNC: 11 MMOL/L (ref 5–15)
ARTERIAL PATENCY WRIST A: ABNORMAL
ARTERIAL PATENCY WRIST A: ABNORMAL
ATMOSPHERIC PRESS: 749 MMHG
ATMOSPHERIC PRESS: 750 MMHG
BASE EXCESS BLDA CALC-SCNC: 0.4 MMOL/L (ref 0–2)
BASE EXCESS BLDA CALC-SCNC: 2.2 MMOL/L (ref 0–2)
BASOPHILS # BLD AUTO: 0.04 10*3/MM3 (ref 0–0.2)
BASOPHILS NFR BLD AUTO: 0.4 % (ref 0–1.5)
BDY SITE: ABNORMAL
BDY SITE: ABNORMAL
BUN BLD-MCNC: 17 MG/DL (ref 6–20)
BUN/CREAT SERPL: 35.4 (ref 7–25)
CALCIUM SPEC-SCNC: 8.5 MG/DL (ref 8.6–10.5)
CHLORIDE SERPL-SCNC: 106 MMOL/L (ref 98–107)
CO2 SERPL-SCNC: 24 MMOL/L (ref 22–29)
CREAT BLD-MCNC: 0.48 MG/DL (ref 0.76–1.27)
DEPRECATED RDW RBC AUTO: 45.5 FL (ref 37–54)
EOSINOPHIL # BLD AUTO: 0.19 10*3/MM3 (ref 0–0.4)
EOSINOPHIL NFR BLD AUTO: 2 % (ref 0.3–6.2)
ERYTHROCYTE [DISTWIDTH] IN BLOOD BY AUTOMATED COUNT: 13.6 % (ref 12.3–15.4)
GAS FLOW AIRWAY: 2 LPM
GFR SERPL CREATININE-BSD FRML MDRD: >150 ML/MIN/1.73
GLUCOSE BLD-MCNC: 114 MG/DL (ref 65–99)
GLUCOSE BLDC GLUCOMTR-MCNC: 78 MG/DL (ref 70–130)
GLUCOSE BLDC GLUCOMTR-MCNC: 80 MG/DL (ref 70–130)
GLUCOSE BLDC GLUCOMTR-MCNC: 97 MG/DL (ref 70–130)
HCO3 BLDA-SCNC: 25.3 MMOL/L (ref 20–26)
HCO3 BLDA-SCNC: 28.3 MMOL/L (ref 20–26)
HCT VFR BLD AUTO: 44.2 % (ref 37.5–51)
HGB BLD-MCNC: 15.4 G/DL (ref 13–17.7)
HOROWITZ INDEX BLD+IHG-RTO: 30 %
IMM GRANULOCYTES # BLD AUTO: 0.05 10*3/MM3 (ref 0–0.05)
IMM GRANULOCYTES NFR BLD AUTO: 0.5 % (ref 0–0.5)
LYMPHOCYTES # BLD AUTO: 1.08 10*3/MM3 (ref 0.7–3.1)
LYMPHOCYTES NFR BLD AUTO: 11.6 % (ref 19.6–45.3)
Lab: ABNORMAL
Lab: ABNORMAL
MCH RBC QN AUTO: 31.6 PG (ref 26.6–33)
MCHC RBC AUTO-ENTMCNC: 34.8 G/DL (ref 31.5–35.7)
MCV RBC AUTO: 90.6 FL (ref 79–97)
MODALITY: ABNORMAL
MODALITY: ABNORMAL
MONOCYTES # BLD AUTO: 0.92 10*3/MM3 (ref 0.1–0.9)
MONOCYTES NFR BLD AUTO: 9.9 % (ref 5–12)
NEUTROPHILS # BLD AUTO: 7.03 10*3/MM3 (ref 1.7–7)
NEUTROPHILS NFR BLD AUTO: 75.6 % (ref 42.7–76)
NRBC BLD AUTO-RTO: 0 /100 WBC (ref 0–0.2)
PCO2 BLDA: 40.6 MM HG (ref 35–45)
PCO2 BLDA: 47.7 MM HG (ref 35–45)
PEEP RESPIRATORY: 5 CM[H2O]
PH BLDA: 7.38 PH UNITS (ref 7.35–7.45)
PH BLDA: 7.4 PH UNITS (ref 7.35–7.45)
PLATELET # BLD AUTO: 250 10*3/MM3 (ref 140–450)
PMV BLD AUTO: 10.5 FL (ref 6–12)
PO2 BLDA: 83.1 MM HG (ref 83–108)
PO2 BLDA: 83.6 MM HG (ref 83–108)
POTASSIUM BLD-SCNC: 3.3 MMOL/L (ref 3.5–5.2)
POTASSIUM BLD-SCNC: 4.7 MMOL/L (ref 3.5–5.2)
RBC # BLD AUTO: 4.88 10*6/MM3 (ref 4.14–5.8)
SAO2 % BLDCOA: 96.2 % (ref 94–99)
SAO2 % BLDCOA: 99.5 % (ref 94–99)
SODIUM BLD-SCNC: 141 MMOL/L (ref 136–145)
VENTILATOR MODE: ABNORMAL
VENTILATOR MODE: ABNORMAL
WBC NRBC COR # BLD: 9.31 10*3/MM3 (ref 3.4–10.8)

## 2020-05-06 PROCEDURE — 82962 GLUCOSE BLOOD TEST: CPT

## 2020-05-06 PROCEDURE — 71045 X-RAY EXAM CHEST 1 VIEW: CPT

## 2020-05-06 PROCEDURE — 82803 BLOOD GASES ANY COMBINATION: CPT

## 2020-05-06 PROCEDURE — 25010000002 METHYLPREDNISOLONE PER 40 MG: Performed by: INTERNAL MEDICINE

## 2020-05-06 PROCEDURE — 25010000002 ENOXAPARIN PER 10 MG: Performed by: INTERNAL MEDICINE

## 2020-05-06 PROCEDURE — 94799 UNLISTED PULMONARY SVC/PX: CPT

## 2020-05-06 PROCEDURE — 85025 COMPLETE CBC W/AUTO DIFF WBC: CPT | Performed by: HOSPITALIST

## 2020-05-06 PROCEDURE — 36600 WITHDRAWAL OF ARTERIAL BLOOD: CPT

## 2020-05-06 PROCEDURE — 25010000002 PROPOFOL 10 MG/ML EMULSION: Performed by: HOSPITALIST

## 2020-05-06 PROCEDURE — 94002 VENT MGMT INPAT INIT DAY: CPT

## 2020-05-06 PROCEDURE — 25010000002 FENTANYL CITRATE (PF) 100 MCG/2ML SOLUTION: Performed by: INTERNAL MEDICINE

## 2020-05-06 PROCEDURE — 84132 ASSAY OF SERUM POTASSIUM: CPT | Performed by: INTERNAL MEDICINE

## 2020-05-06 PROCEDURE — 94003 VENT MGMT INPAT SUBQ DAY: CPT

## 2020-05-06 PROCEDURE — 80048 BASIC METABOLIC PNL TOTAL CA: CPT | Performed by: HOSPITALIST

## 2020-05-06 RX ORDER — POTASSIUM CHLORIDE 750 MG/1
40 CAPSULE, EXTENDED RELEASE ORAL AS NEEDED
Status: DISCONTINUED | OUTPATIENT
Start: 2020-05-06 | End: 2020-05-08 | Stop reason: HOSPADM

## 2020-05-06 RX ORDER — SIMETHICONE 80 MG
80 TABLET,CHEWABLE ORAL 4 TIMES DAILY PRN
Status: DISCONTINUED | OUTPATIENT
Start: 2020-05-06 | End: 2020-05-08 | Stop reason: HOSPADM

## 2020-05-06 RX ORDER — METHYLPREDNISOLONE SODIUM SUCCINATE 40 MG/ML
40 INJECTION, POWDER, LYOPHILIZED, FOR SOLUTION INTRAMUSCULAR; INTRAVENOUS ONCE
Status: COMPLETED | OUTPATIENT
Start: 2020-05-06 | End: 2020-05-06

## 2020-05-06 RX ORDER — ACETAMINOPHEN 160 MG/5ML
650 SOLUTION ORAL EVERY 6 HOURS PRN
Status: DISCONTINUED | OUTPATIENT
Start: 2020-05-06 | End: 2020-05-08 | Stop reason: HOSPADM

## 2020-05-06 RX ORDER — POTASSIUM CHLORIDE 7.45 MG/ML
10 INJECTION INTRAVENOUS
Status: DISCONTINUED | OUTPATIENT
Start: 2020-05-06 | End: 2020-05-08 | Stop reason: HOSPADM

## 2020-05-06 RX ORDER — METHYLPREDNISOLONE SODIUM SUCCINATE 40 MG/ML
40 INJECTION, POWDER, LYOPHILIZED, FOR SOLUTION INTRAMUSCULAR; INTRAVENOUS EVERY 6 HOURS
Status: DISCONTINUED | OUTPATIENT
Start: 2020-05-06 | End: 2020-05-07

## 2020-05-06 RX ORDER — IPRATROPIUM BROMIDE AND ALBUTEROL SULFATE 2.5; .5 MG/3ML; MG/3ML
3 SOLUTION RESPIRATORY (INHALATION) EVERY 6 HOURS PRN
Status: DISCONTINUED | OUTPATIENT
Start: 2020-05-06 | End: 2020-05-08 | Stop reason: HOSPADM

## 2020-05-06 RX ORDER — POTASSIUM CHLORIDE 1.5 G/1.77G
40 POWDER, FOR SOLUTION ORAL AS NEEDED
Status: DISCONTINUED | OUTPATIENT
Start: 2020-05-06 | End: 2020-05-08 | Stop reason: HOSPADM

## 2020-05-06 RX ADMIN — SODIUM CHLORIDE, PRESERVATIVE FREE 10 ML: 5 INJECTION INTRAVENOUS at 08:07

## 2020-05-06 RX ADMIN — FENTANYL CITRATE 25 MCG: 50 INJECTION, SOLUTION INTRAMUSCULAR; INTRAVENOUS at 05:42

## 2020-05-06 RX ADMIN — METHYLPREDNISOLONE SODIUM SUCCINATE 40 MG: 40 INJECTION, POWDER, FOR SOLUTION INTRAMUSCULAR; INTRAVENOUS at 23:56

## 2020-05-06 RX ADMIN — ALBUTEROL SULFATE 2 PUFF: 90 AEROSOL, METERED RESPIRATORY (INHALATION) at 07:48

## 2020-05-06 RX ADMIN — POTASSIUM CHLORIDE 40 MEQ: 1.5 POWDER, FOR SOLUTION ORAL at 11:02

## 2020-05-06 RX ADMIN — ALBUTEROL SULFATE 2 PUFF: 90 AEROSOL, METERED RESPIRATORY (INHALATION) at 15:11

## 2020-05-06 RX ADMIN — PROPOFOL 40 MCG/KG/MIN: 10 INJECTION, EMULSION INTRAVENOUS at 09:32

## 2020-05-06 RX ADMIN — FENTANYL CITRATE 25 MCG: 50 INJECTION, SOLUTION INTRAMUSCULAR; INTRAVENOUS at 08:14

## 2020-05-06 RX ADMIN — PROPOFOL 90 MCG/KG/MIN: 10 INJECTION, EMULSION INTRAVENOUS at 00:23

## 2020-05-06 RX ADMIN — PROPOFOL 90 MCG/KG/MIN: 10 INJECTION, EMULSION INTRAVENOUS at 04:59

## 2020-05-06 RX ADMIN — POTASSIUM CHLORIDE 40 MEQ: 10 CAPSULE, COATED, EXTENDED RELEASE ORAL at 14:47

## 2020-05-06 RX ADMIN — ALBUTEROL SULFATE 2 PUFF: 90 AEROSOL, METERED RESPIRATORY (INHALATION) at 11:40

## 2020-05-06 RX ADMIN — FENTANYL CITRATE 25 MCG: 50 INJECTION, SOLUTION INTRAMUSCULAR; INTRAVENOUS at 01:34

## 2020-05-06 RX ADMIN — PROPOFOL 90 MCG/KG/MIN: 10 INJECTION, EMULSION INTRAVENOUS at 02:34

## 2020-05-06 RX ADMIN — METHYLPREDNISOLONE SODIUM SUCCINATE 40 MG: 40 INJECTION, POWDER, FOR SOLUTION INTRAMUSCULAR; INTRAVENOUS at 18:16

## 2020-05-06 RX ADMIN — CHLORHEXIDINE GLUCONATE 0.12% ORAL RINSE 15 ML: 1.2 LIQUID ORAL at 08:07

## 2020-05-06 RX ADMIN — FAMOTIDINE 20 MG: 10 INJECTION INTRAVENOUS at 08:07

## 2020-05-06 RX ADMIN — ALBUTEROL SULFATE 2 PUFF: 90 AEROSOL, METERED RESPIRATORY (INHALATION) at 23:21

## 2020-05-06 RX ADMIN — ALBUTEROL SULFATE 2 PUFF: 90 AEROSOL, METERED RESPIRATORY (INHALATION) at 03:11

## 2020-05-06 RX ADMIN — FAMOTIDINE 20 MG: 10 INJECTION INTRAVENOUS at 20:16

## 2020-05-06 RX ADMIN — ENOXAPARIN SODIUM 40 MG: 40 INJECTION SUBCUTANEOUS at 08:07

## 2020-05-06 RX ADMIN — ACETAMINOPHEN 650 MG: 650 SOLUTION ORAL at 11:01

## 2020-05-06 RX ADMIN — METHYLPREDNISOLONE SODIUM SUCCINATE 40 MG: 40 INJECTION, POWDER, FOR SOLUTION INTRAMUSCULAR; INTRAVENOUS at 10:09

## 2020-05-06 RX ADMIN — ALBUTEROL SULFATE 2 PUFF: 90 AEROSOL, METERED RESPIRATORY (INHALATION) at 19:29

## 2020-05-06 RX ADMIN — SODIUM CHLORIDE, PRESERVATIVE FREE 10 ML: 5 INJECTION INTRAVENOUS at 20:16

## 2020-05-06 NOTE — PLAN OF CARE
Problem: Patient Care Overview  Goal: Plan of Care Review  Flowsheets  Taken 5/6/2020 0503  Progress: no change  Outcome Summary: Patient remains on ventilator, SAT began at 0503, plans for SBT this AM. VSS through shift. Continuing to monitor patient.  Taken 5/6/2020 0000  Plan of Care Reviewed With: family

## 2020-05-06 NOTE — CONSULTS
Adult Nutrition  Assessment    Patient Name:  Timmy Diop  YOB: 1960  MRN: [id number]  Admit Date:  5/3/2020    Assessment Date:  5/6/2020    Comments:  Pt extubated this am and clear liquid diet has been started.  He is still being managed for Acute Resp Failure with COPD exacerbation. His tube feeding was at 45cc/hr, goal rate, when he was extubated.  RD will continue to monitor.      Reason for Assessment     Row Name 05/06/20 1407          Reason for Assessment    Reason For Assessment  follow-up protocol         Nutrition/Diet History     Row Name 05/06/20 1407          Nutrition/Diet History    Typical Food/Fluid Intake  Pt extubated this am and has been started on clear liquids per staff.  Doing well at this time           Labs/Tests/Procedures/Meds     Row Name 05/06/20 1408          Labs/Procedures/Meds    Lab Results Reviewed  reviewed, pertinent     Lab Results Comments  K+ 3.3;         Diagnostic Tests/Procedures    Diagnostic Test/Procedure Reviewed  reviewed, pertinent     Diagnostic Test/Procedures Comments  Extubated this am        Medications    Pertinent Medications Reviewed  reviewed, pertinent     Pertinent Medications Comments  SSI;         Physical Findings     Row Name 05/06/20 1409          Physical Findings    Overall Physical Appearance  on oxygen therapy           Nutrition Prescription Ordered     Row Name 05/06/20 1409          Nutrition Prescription PO    Current PO Diet  Clear Liquid         Evaluation of Received Nutrient/Fluid Intake     Row Name 05/06/20 1409          Calories Evaluation    Enteral Calories (kcal)  -- tube feeding @ goal rate when pt was extubated               Electronically signed by:  Lani Belcher RD  05/06/20 14:34

## 2020-05-06 NOTE — PLAN OF CARE
Problem: Patient Care Overview  Goal: Plan of Care Review  Outcome: Ongoing (interventions implemented as appropriate)  Flowsheets (Taken 5/6/2020 1432)  Progress: improving  Plan of Care Reviewed With: caregiver  Outcome Summary: Pt extubated.  PO diet of clear liquids started.  Rd will continue to monitor progression of diet

## 2020-05-06 NOTE — PLAN OF CARE
Pt has tolerated ventilator throughout the night. Pt had increased PIP's that stay consistenly around 39-41. Pt can follow commands, raise head off pillow, and squeeze RT's hand. Pt placed on SBT PS 8/5 at 0521 on 5/6/20. Will continue to monitor patient and progress.

## 2020-05-06 NOTE — PLAN OF CARE
Problem: Patient Care Overview  Goal: Plan of Care Review  Outcome: Ongoing (interventions implemented as appropriate)  Flowsheets  Taken 5/6/2020 1616 by Ana Laura Black, RN  Progress: improving  Outcome Summary: vss,extubated at 10;45, farr d/c, up in chair and ambulating in room, 2L NC, cont to monitor  Taken 5/6/2020 1432 by Lani Belcher RD  Plan of Care Reviewed With: caregiver
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
## 2020-05-07 LAB
ANION GAP SERPL CALCULATED.3IONS-SCNC: 12 MMOL/L (ref 5–15)
BASOPHILS # BLD AUTO: 0.02 10*3/MM3 (ref 0–0.2)
BASOPHILS NFR BLD AUTO: 0.2 % (ref 0–1.5)
BUN BLD-MCNC: 11 MG/DL (ref 6–20)
BUN/CREAT SERPL: 22.4 (ref 7–25)
CALCIUM SPEC-SCNC: 9.1 MG/DL (ref 8.6–10.5)
CHLORIDE SERPL-SCNC: 104 MMOL/L (ref 98–107)
CO2 SERPL-SCNC: 24 MMOL/L (ref 22–29)
CREAT BLD-MCNC: 0.49 MG/DL (ref 0.76–1.27)
DEPRECATED RDW RBC AUTO: 43.1 FL (ref 37–54)
EOSINOPHIL # BLD AUTO: 0 10*3/MM3 (ref 0–0.4)
EOSINOPHIL NFR BLD AUTO: 0 % (ref 0.3–6.2)
ERYTHROCYTE [DISTWIDTH] IN BLOOD BY AUTOMATED COUNT: 13.1 % (ref 12.3–15.4)
GFR SERPL CREATININE-BSD FRML MDRD: >150 ML/MIN/1.73
GLUCOSE BLD-MCNC: 171 MG/DL (ref 65–99)
GLUCOSE BLDC GLUCOMTR-MCNC: 166 MG/DL (ref 70–130)
HCT VFR BLD AUTO: 47 % (ref 37.5–51)
HGB BLD-MCNC: 16.1 G/DL (ref 13–17.7)
IMM GRANULOCYTES # BLD AUTO: 0.05 10*3/MM3 (ref 0–0.05)
IMM GRANULOCYTES NFR BLD AUTO: 0.5 % (ref 0–0.5)
LYMPHOCYTES # BLD AUTO: 0.65 10*3/MM3 (ref 0.7–3.1)
LYMPHOCYTES NFR BLD AUTO: 6.4 % (ref 19.6–45.3)
MCH RBC QN AUTO: 30.8 PG (ref 26.6–33)
MCHC RBC AUTO-ENTMCNC: 34.3 G/DL (ref 31.5–35.7)
MCV RBC AUTO: 89.9 FL (ref 79–97)
MONOCYTES # BLD AUTO: 0.33 10*3/MM3 (ref 0.1–0.9)
MONOCYTES NFR BLD AUTO: 3.2 % (ref 5–12)
NEUTROPHILS # BLD AUTO: 9.14 10*3/MM3 (ref 1.7–7)
NEUTROPHILS NFR BLD AUTO: 89.7 % (ref 42.7–76)
NRBC BLD AUTO-RTO: 0 /100 WBC (ref 0–0.2)
PLATELET # BLD AUTO: 271 10*3/MM3 (ref 140–450)
PMV BLD AUTO: 10.4 FL (ref 6–12)
POTASSIUM BLD-SCNC: 4.5 MMOL/L (ref 3.5–5.2)
RBC # BLD AUTO: 5.23 10*6/MM3 (ref 4.14–5.8)
SODIUM BLD-SCNC: 140 MMOL/L (ref 136–145)
WBC NRBC COR # BLD: 10.19 10*3/MM3 (ref 3.4–10.8)

## 2020-05-07 PROCEDURE — 82962 GLUCOSE BLOOD TEST: CPT

## 2020-05-07 PROCEDURE — 25010000002 ENOXAPARIN PER 10 MG: Performed by: INTERNAL MEDICINE

## 2020-05-07 PROCEDURE — 97166 OT EVAL MOD COMPLEX 45 MIN: CPT

## 2020-05-07 PROCEDURE — 25010000002 METHYLPREDNISOLONE PER 40 MG: Performed by: INTERNAL MEDICINE

## 2020-05-07 PROCEDURE — 85025 COMPLETE CBC W/AUTO DIFF WBC: CPT | Performed by: HOSPITALIST

## 2020-05-07 PROCEDURE — 97162 PT EVAL MOD COMPLEX 30 MIN: CPT

## 2020-05-07 PROCEDURE — 80048 BASIC METABOLIC PNL TOTAL CA: CPT | Performed by: HOSPITALIST

## 2020-05-07 PROCEDURE — 94799 UNLISTED PULMONARY SVC/PX: CPT

## 2020-05-07 RX ORDER — METHYLPREDNISOLONE SODIUM SUCCINATE 40 MG/ML
40 INJECTION, POWDER, LYOPHILIZED, FOR SOLUTION INTRAMUSCULAR; INTRAVENOUS EVERY 12 HOURS
Status: DISCONTINUED | OUTPATIENT
Start: 2020-05-07 | End: 2020-05-07

## 2020-05-07 RX ORDER — PREDNISONE 20 MG/1
60 TABLET ORAL
Status: DISCONTINUED | OUTPATIENT
Start: 2020-05-08 | End: 2020-05-08 | Stop reason: HOSPADM

## 2020-05-07 RX ADMIN — FAMOTIDINE 20 MG: 10 INJECTION INTRAVENOUS at 08:16

## 2020-05-07 RX ADMIN — METHYLPREDNISOLONE SODIUM SUCCINATE 40 MG: 40 INJECTION, POWDER, FOR SOLUTION INTRAMUSCULAR; INTRAVENOUS at 05:47

## 2020-05-07 RX ADMIN — ALBUTEROL SULFATE 2 PUFF: 90 AEROSOL, METERED RESPIRATORY (INHALATION) at 12:43

## 2020-05-07 RX ADMIN — FAMOTIDINE 20 MG: 10 INJECTION INTRAVENOUS at 21:42

## 2020-05-07 RX ADMIN — METHYLPREDNISOLONE SODIUM SUCCINATE 40 MG: 40 INJECTION, POWDER, FOR SOLUTION INTRAMUSCULAR; INTRAVENOUS at 17:17

## 2020-05-07 RX ADMIN — SODIUM CHLORIDE, PRESERVATIVE FREE 10 ML: 5 INJECTION INTRAVENOUS at 08:16

## 2020-05-07 RX ADMIN — ENOXAPARIN SODIUM 40 MG: 40 INJECTION SUBCUTANEOUS at 08:16

## 2020-05-07 RX ADMIN — ALBUTEROL SULFATE 2 PUFF: 90 AEROSOL, METERED RESPIRATORY (INHALATION) at 07:52

## 2020-05-07 RX ADMIN — SODIUM CHLORIDE, PRESERVATIVE FREE 10 ML: 5 INJECTION INTRAVENOUS at 21:42

## 2020-05-07 NOTE — PLAN OF CARE
Problem: Patient Care Overview  Goal: Plan of Care Review  Outcome: Ongoing (interventions implemented as appropriate)  Flowsheets (Taken 5/7/2020 1219)  Plan of Care Reviewed With: patient  Outcome Summary: Initial PT evaluation complete; co-evaluation with OT.  Patient is alert and cooperative.  He requires SPV with transfers, CGA with gait, ambulating 150'x1 without an AD but slow milagro, decreased step length and toe-off.  Tinetti assessed: 24/28 or low fall risk; FWW not necessary.  PT POC to concentrate on improving gait mechanics and further lowering fall risk. Patient is not likely to need a FWW upon discharge, is not likely to need HHPT either.  Goals established, continue skilled PT.

## 2020-05-07 NOTE — PLAN OF CARE
Problem: Patient Care Overview  Goal: Plan of Care Review  Outcome: Ongoing (interventions implemented as appropriate)  Flowsheets  Taken 5/7/2020 1312 by Ana Laura Black RN  Progress: improving  Outcome Summary: vss, lung sounds clear, up ad ty, no complaints, transfer to floor, doing well, home tomorrow  Taken 5/7/2020 1219 by Saud Pemberton, PT  Plan of Care Reviewed With: patient
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
## 2020-05-07 NOTE — PLAN OF CARE
Problem: Patient Care Overview  Goal: Plan of Care Review  Outcome: Ongoing (interventions implemented as appropriate)  Flowsheets (Taken 5/7/2020 1011)  Plan of Care Reviewed With: patient  Note:   Pt taking po diet w/o difficulty. Reports good appetite. RD monitoring.

## 2020-05-07 NOTE — THERAPY EVALUATION
Acute Care - Occupational Therapy Initial Evaluation  [facility]     Patient Name: Timmy Diop  : 1960  MRN: [id number]  Today's Date: 2020     Date of Referral to OT: 20       Admit Date: 5/3/2020       ICD-10-CM ICD-9-CM   1. Acute respiratory failure with hypoxia and hypercapnia (CMS/[facility]) J96.01 518.81    J96.02    2. Pneumonia of both lower lobes due to infectious organism (CMS/[facility]) J18.1 483.8   3. Sepsis, due to unspecified organism, unspecified whether acute organ dysfunction present (CMS/[facility]) A41.9 038.9     995.91   4. Impaired mobility and activities of daily living Z74.09 799.89     Patient Active Problem List   Diagnosis   • History of dermatomyositis   • Rotator cuff tear   • Contact dermatitis   • Acute respiratory failure with hypoxia and hypercapnia (CMS/[facility])     Past Medical History:   Diagnosis Date   • Asthma    • COPD (chronic obstructive pulmonary disease) (CMS/[facility])    • Erysipelas of lower extremity      Past Surgical History:   Procedure Laterality Date   • INCISION AND DRAINAGE ABSCESS      right lateral chest wall   • ROTATOR CUFF REPAIR Bilateral     Dr Fajardo          OT ASSESSMENT FLOWSHEET (last 12 hours)      Occupational Therapy Evaluation     Row Name 20 0849                   OT Evaluation Time/Intention    Subjective Information  no complaints  -RB        Document Type  evaluation  -RB        Mode of Treatment  occupational therapy;co-treatment;physical therapy  -RB           General Information    Patient Profile Reviewed?  yes  -RB        Prior Level of Function  independent:;all household mobility;community mobility;gait;transfer;ADL's;driving  -RB           Relationship/Environment    Lives With  spouse;child(darren), adult;child(darren), dependent  -RB           Resource/Environmental Concerns    Current Living Arrangements  home/apartment/condo  -RB           Home Main Entrance    Number of Stairs, Main Entrance  two  -RB        Stair Railings,  Main Entrance  railings safe and in good condition  -RB           Cognitive Assessment/Intervention- PT/OT    Orientation Status (Cognition)  oriented x 3  -RB        Cognitive Assessment/Intervention Comment  Diff with month.  -RB           Sit-Stand Transfer    Sit-Stand [place] (Transfers)  supervision  -RB           ADL Assessment/Intervention    BADL Assessment/Intervention  lower body dressing  -RB           Lower Body Dressing Assessment/Training    Lower Body Dressing [place] Level  lower body dressing skills;doff;don;socks;set up;supervision  -RB        Lower Body Dressing Position  unsupported sitting  -RB           BADL Safety/Performance    Impairments, BADL Safety/Performance  balance;endurance/activity tolerance  -RB           General ROM    GENERAL ROM COMMENTS  B UE AROM was WNLs.  -RB           MMT (Manual Muscle Testing)    General MMT Comments  B shld flex was 4/5 and rest of B UE strength was 4+ to 5/5.  -RB           Sensory Assessment/Intervention    Sensory General Assessment  no sensation deficits identified  -RB           Clinical Impression (OT)    Date of Referral to OT  05/06/20  -RB        OT Diagnosis  -- Impaired mob and ADLs.  -RB        Functional Level at Time of Evaluation (OT Eval)  Impaired mob and ADLS.  -RB        Rehab Potential (OT Eval)  good, to achieve stated therapy goals  -RB        Therapy Frequency (OT Eval)  other (see comments) 5-7 days/wk.  -RB        Predicted Duration of Therapy Intervention (Therapy Eval)  Until D/C or goals met.  -RB        Care Plan Review (OT)  evaluation/treatment results reviewed;care plan/treatment goals reviewed;risks/benefits reviewed;patient/other agree to care plan  -RB        Anticipated Discharge Disposition (OT)  home with home health  -RB           Planned OT Interventions    Planned Therapy Interventions (OT Eval)  activity tolerance training;adaptive equipment training;BADL retraining;functional balance  retraining;occupation/activity based interventions;patient/caregiver education/training;ROM/therapeutic exercise;strengthening exercise;transfer/mobility retraining  -RB           OT Goals    Transfer Goal Selection (OT)  transfer, OT goal 1  -RB        Bathing Goal Selection (OT)  bathing, OT goal 1  -RB        Dressing Goal Selection (OT)  dressing, OT goal 1  -RB        Toileting Goal Selection (OT)  toileting, OT goal 1  -RB        Activity Tolerance Goal Selection (OT)  activity tolerance, OT goal 1  -RB        Additional Documentation  Activity Tolerance Goal Selection (OT) (Row)  -RB           Transfer Goal 1 (OT)    Activity/Assistive Device (Transfer Goal 1, OT)  transfers, all  -RB        [place] Level/Cues Needed (Transfer Goal 1, OT)  independent  -RB        Time Frame (Transfer Goal 1, OT)  long term goal (LTG)  -RB        Progress/Outcome (Transfer Goal 1, OT)  goal not met  -RB           Bathing Goal 1 (OT)    Activity/Assistive Device (Bathing Goal 1, OT)  bathing skills, all  -RB        [place] Level/Cues Needed (Bathing Goal 1, OT)  conditional independence  -RB        Time Frame (Bathing Goal 1, OT)  long term goal (LTG)  -RB        Progress/Outcomes (Bathing Goal 1, OT)  goal not met  -RB           Dressing Goal 1 (OT)    Activity/Assistive Device (Dressing Goal 1, OT)  dressing skills, all  -RB        [place]/Cues Needed (Dressing Goal 1, OT)  conditional independence  -RB        Time Frame (Dressing Goal 1, OT)  long term goal (LTG)  -RB        Progress/Outcome (Dressing Goal 1, OT)  goal not met  -RB           Toileting Goal 1 (OT)    Activity/Device (Toileting Goal 1, OT)  toileting skills, all  -RB        [place] Level/Cues Needed (Toileting Goal 1, OT)  independent  -RB        Time Frame (Toileting Goal 1, OT)  long term goal (LTG)  -RB        Progress/Outcome (Toileting Goal 1, OT)  goal not met  -RB            Activity Tolerance Goal 1 (OT)    Activity Tolerance Goal 1  (OT)  ADL/therapeutic activity with 1-2 rest breaks  -RB        Activity Level (Endurance Goal 1, OT)  15 min activity  -RB        Time Frame (Activity Tolerance Goal 1, OT)  long term goal (LTG)  -RB        Progress/Outcome (Activity Tolerance Goal 1, OT)  goal not met  -RB           Patient Education Goal (OT)    Activity (Patient Education Goal, OT)  Home safety/fall prev and EC/WS.  -RB        [place]/Cues/Accuracy (Memory Goal 2, OT)  demonstrates adequately;verbalizes understanding  -RB        Time Frame (Patient Education Goal, OT)  long term goal (LTG)  -RB        Progress/Outcome (Patient Education Goal, OT)  goal not met  -RB          User Key  (r) = Recorded By, (t) = Taken By, (c) = Cosigned By    Initials Name Effective Dates    RB Quang Liu, OT 07/24/19 -          Occupational Therapy Education                 Title: PT OT SLP Therapies (In Progress)     Topic: Occupational Therapy (In Progress)     Point: ADL training (Not Started)     Description:   Instruct learner(s) on proper safety adaptation and remediation techniques during self care or transfers.   Instruct in proper use of assistive devices.              Learner Progress:   Not documented in this visit.          Point: Home exercise program (Not Started)     Description:   Instruct learner(s) on appropriate technique for monitoring, assisting and/or progressing therapeutic exercises/activities.              Learner Progress:   Not documented in this visit.          Point: Precautions (Done)     Description:   Instruct learner(s) on prescribed precautions during self-care and functional transfers.              Learning Progress Summary           Patient Acceptance, E, VU by RB at 5/7/2020 1143    Comment:  Edu pt on use of gait belt and non skid socks when OOB and no OOB without assist.                   Point: Body mechanics (Not Started)     Description:   Instruct learner(s) on proper positioning and spine alignment during  self-care, functional mobility activities and/or exercises.              Learner Progress:   Not documented in this visit.                      User Key     Initials Effective Dates Name Provider Type Discipline    RB 07/24/19 -  Quang Liu OT Occupational Therapist OT                  OT Recommendation and Plan  Outcome Summary/Treatment Plan (OT)  Anticipated Discharge Disposition (OT): home with home health  Planned Therapy Interventions (OT Eval): activity tolerance training, adaptive equipment training, BADL retraining, functional balance retraining, occupation/activity based interventions, patient/caregiver education/training, ROM/therapeutic exercise, strengthening exercise, transfer/mobility retraining  Therapy Frequency (OT Eval): other (see comments)(5-7 days/wk.)  Plan of Care Review  Plan of Care Reviewed With: patient  Plan of Care Reviewed With: patient  Outcome Summary: OT eval on this date as co-eval with PT.  Pt was set up supervision to don/doff socks, supervision for toilet transfer and CGA for 150' ambulation without device.  Pt out of breath easily.  He could benefit from OT services to increase safety and independence with ADLs and functiona mobility.    Outcome Measures     Row Name 05/07/20 0849             How much help from another is currently needed...    Putting on and taking off regular lower body clothing?  3  -RB      Bathing (including washing, rinsing, and drying)  3  -RB      Toileting (which includes using toilet bed pan or urinal)  3  -RB      Putting on and taking off regular upper body clothing  4  -RB      Taking care of personal grooming (such as brushing teeth)  4  -RB      Eating meals  4  -RB      AM-PAC 6 Clicks Score (OT)  21  -RB         Functional Assessment    Outcome Measure Options  AM-PAC 6 Clicks Daily Activity (OT)  -RB        User Key  (r) = Recorded By, (t) = Taken By, (c) = Cosigned By    Initials Name Provider Type    RB Quang Liu, BANDAR Occupational  Therapist          Time Calculation:   Time Calculation- OT     Row Name 05/07/20 1148             Time Calculation- OT    OT Start Time  0848  -RB      OT Stop Time  0919  -RB      OT Time Calculation (min)  31 min  -RB      OT Received On  05/07/20  -RB      OT Goal Re-Cert Due Date  05/20/20  -        User Key  (r) = Recorded By, (t) = Taken By, (c) = Cosigned By    Initials Name Provider Type    RB Quang Liu OT Occupational Therapist        Therapy Charges for Today     Code Description Service Date Service Provider Modifiers Qty    [id number]  OT EVAL MOD COMPLEXITY 2 5/7/2020 Quang Liu OT GO 1               Quang Liu OT  5/7/2020

## 2020-05-07 NOTE — PLAN OF CARE
Problem: Patient Care Overview  Goal: Plan of Care Review  Outcome: Ongoing (interventions implemented as appropriate)  Flowsheets (Taken 5/7/2020 1147)  Plan of Care Reviewed With: patient  Outcome Summary: OT eval on this date as co-eval with PT.  Pt was set up supervision to don/doff socks, supervision for toilet transfer and CGA for 150' ambulation without device.  Pt out of breath easily.  He could benefit from OT services to increase safety and independence with ADLs and functiona mobility.  Pt may benefit from pulmonary rehab when D/C from hospital.

## 2020-05-07 NOTE — THERAPY EVALUATION
Patient Name: Timmy Diop  : 1960    MRN: [id number]                              Today's Date: 2020       Admit Date: 5/3/2020    Visit Dx:     ICD-10-CM ICD-9-CM   1. Acute respiratory failure with hypoxia and hypercapnia (CMS/[facility]) J96.01 518.81    J96.02    2. Pneumonia of both lower lobes due to infectious organism (CMS/[facility]) J18.1 483.8   3. Sepsis, due to unspecified organism, unspecified whether acute organ dysfunction present (CMS/[facility]) A41.9 038.9     995.91   4. Impaired mobility and activities of daily living Z74.09 799.89   5. Impaired physical mobility Z74.09 781.99     Patient Active Problem List   Diagnosis   • History of dermatomyositis   • Rotator cuff tear   • Contact dermatitis   • Acute respiratory failure with hypoxia and hypercapnia (CMS/[facility])     Past Medical History:   Diagnosis Date   • Asthma    • COPD (chronic obstructive pulmonary disease) (CMS/[facility])    • Erysipelas of lower extremity      Past Surgical History:   Procedure Laterality Date   • INCISION AND DRAINAGE ABSCESS      right lateral chest wall   • ROTATOR CUFF REPAIR Bilateral     Dr Fajardo     General Information     Row Name 20 0848          PT Evaluation Time/Intention    Document Type  evaluation  -CZ     Mode of Treatment  physical therapy;occupational therapy;co-treatment  -CZ     Row Name 20 0848          General Information    Patient Profile Reviewed?  yes  -CZ     Prior Level of Function  independent:;all household mobility;community mobility  -CZ     Row Name 20 0848          Relationship/Environment    Lives With  spouse;child(darren), adult;child(darren), dependent  -CZ     Row Name 20 0848          Resource/Environmental Concerns    Current Living Arrangements  home/apartment/condo  -CZ     Row Name 20 0848          Home Main Entrance    Number of Stairs, Main Entrance  two  -CZ     Stair Railings, Main Entrance  none  -CZ     Row Name 20 0848          Stairs Within  Home, Primary    Stairs, Within Home, Primary  Has second floor, only uses for storage. Disabled spouse has available DME, patient previously was (I) without an AD.   -CZ     Row Name 05/07/20 0848          Cognitive Assessment/Intervention- PT/OT    Orientation Status (Cognition)  oriented x 3  -CZ     Cognitive Assessment/Intervention Comment  Difficulty with month.   -CZ       User Key  (r) = Recorded By, (t) = Taken By, (c) = Cosigned By    Initials Name Provider Type    Saud Camarena, PT Physical Therapist        Mobility     Row Name 05/07/20 0848          Bed Mobility Assessment/Treatment    Comment (Bed Mobility)  Slept in recliner.   -CZ     Row Name 05/07/20 0848          Sit-Stand Transfer    Sit-Stand [place] (Transfers)  supervision  -CZ     Row Name 05/07/20 0848          Gait/Stairs Assessment/Training    [place] Level (Gait)  contact guard  -CZ     Distance in Feet (Gait)  150'x1.   -CZ     Comment (Gait/Stairs)  Slow milagro, wide stance, decreased toe-off, no LOB, no AD.   -CZ       User Key  (r) = Recorded By, (t) = Taken By, (c) = Cosigned By    Initials Name Provider Type    CZ Saud Pemberton, PT Physical Therapist        Obj/Interventions     Row Name 05/07/20 0848          General ROM    GENERAL ROM COMMENTS  BLEs WFL  -CZ     Row Name 05/07/20 0848          MMT (Manual Muscle Testing)    General MMT Comments  BLEs: 3+/5 grossly.   -CZ     Row Name 05/07/20 0848          Sensory Assessment/Intervention    Sensory General Assessment  no sensation deficits identified  -CZ       User Key  (r) = Recorded By, (t) = Taken By, (c) = Cosigned By    Initials Name Provider Type    Saud Camarena, PT Physical Therapist        Goals/Plan     Row Name 05/07/20 0848          Bed Mobility Goal 1 (PT)    Activity/Assistive Device (Bed Mobility Goal 1, PT)  sit to supine/supine to sit  -CZ     [place] Level/Cues Needed (Bed Mobility Goal 1, PT)  independent  -CZ     Time Frame (Bed  Mobility Goal 1, PT)  long term goal (LTG);by discharge  -CZ     Barriers (Bed Mobility Goal 1, PT)  HOB flat, no bed rails.   -CZ     Progress/Outcomes (Bed Mobility Goal 1, PT)  goal not met  -CZ     Row Name 05/07/20 0848          Transfer Goal 1 (PT)    Activity/Assistive Device (Transfer Goal 1, PT)  sit-to-stand/stand-to-sit;bed-to-chair/chair-to-bed  -CZ     [place] Level/Cues Needed (Transfer Goal 1, PT)  independent  -CZ     Time Frame (Transfer Goal 1, PT)  long term goal (LTG);by discharge  -CZ     Progress/Outcome (Transfer Goal 1, PT)  goal not met  -CZ     Row Name 05/07/20 0848          Gait Training Goal 1 (PT)    Activity/Assistive Device (Gait Training Goal 1, PT)  gait (walking locomotion)  -CZ     [place] Level (Gait Training Goal 1, PT)  independent  -CZ     Distance (Gait Goal 1, PT)  300' x 2 without AD.   -CZ     Time Frame (Gait Training Goal 1, PT)  long term goal (LTG);by discharge  -CZ     Barriers (Gait Training Goal 1, PT)  Maintain SpO2 >90%.  Patient demonstrates slow milagro, decreased step length, decreased toe-off.   -CZ     Progress/Outcome (Gait Training Goal 1, PT)  goal not met  -CZ     Row Name 05/07/20 0848          Stairs Goal 1 (PT)    Activity/Assistive Device (Stairs Goal 1, PT)  descending stairs;ascending stairs  -CZ     [place] Level/Cues Needed (Stairs Goal 1, PT)  conditional independence  -CZ     Number of Stairs (Stairs Goal 1, PT)  2 steps, no railings.   -CZ     Time Frame (Stairs Goal 1, PT)  long term goal (LTG);by discharge  -CZ     Barriers (Stairs Goal 1, PT)  No railings at home.   -CZ     Progress/Outcome (Stairs Goal 1, PT)  goal not met  -CZ     Row Name 05/07/20 0848          Patient Education Goal (PT)    Activity (Patient Education Goal, PT)  Tinetti fall risk, score: 27/28.   -CZ     [place]/Cues/Accuracy (Memory Goal 2, PT)  independent  -CZ     Time Frame (Patient Education Goal, PT)  long term goal (LTG);by discharge  -CZ      Progress/Outcome (Patient Education Goal, PT)  goal not met  -CZ       User Key  (r) = Recorded By, (t) = Taken By, (c) = Cosigned By    Initials Name Provider Type    Saud Camarena, PT Physical Therapist        Clinical Impression     Row Name 05/07/20 0849          Pain Assessment    Additional Documentation  Pain Scale: Numbers Pre/Post-Treatment (Group)  -     Row Name 05/07/20 0849          Pain Scale: Numbers Pre/Post-Treatment    Pain Scale: Numbers, Pretreatment  0/10 - no pain  -     Pain Scale: Numbers, Post-Treatment  0/10 - no pain  -CZ     Row Name 05/07/20 0849          Plan of Care Review    Plan of Care Reviewed With  patient  -     Row Name 05/07/20 0849          Physical Therapy Clinical Impression    Criteria for Skilled Interventions Met (PT Clinical Impression)  yes;treatment indicated  -CZ     Rehab Potential (PT Clinical Summary)  good, to achieve stated therapy goals  -CZ     Predicted Duration of Therapy (PT)  1-3 days.   -CZ     Row Name 05/07/20 0849          Vital Signs    Pre Systolic BP Rehab  159  -CZ     Pre Treatment Diastolic BP  89  -CZ     Intra Systolic BP Rehab  135  -CZ     Intra Treatment Diastolic BP  88  -CZ     Post Systolic BP Rehab  137  -CZ     Post Treatment Diastolic BP  80  -CZ     Pretreatment Heart Rate (beats/min)  92  -CZ     Intratreatment Heart Rate (beats/min)  104  -CZ     Posttreatment Heart Rate (beats/min)  102  -CZ     Pretreatment Resp Rate (breaths/min)  27  -CZ     Pre SpO2 (%)  93  -CZ     O2 Delivery Pre Treatment  room air  -CZ     Intra SpO2 (%)  93  -CZ     O2 Delivery Intra Treatment  room air  -CZ     Post SpO2 (%)  95  -CZ     O2 Delivery Post Treatment  room air  -CZ     Pre Patient Position  Sitting  -CZ     Intra Patient Position  Sitting  -CZ     Post Patient Position  Sitting  -CZ     Row Name 05/07/20 0849          Positioning and Restraints    Pre-Treatment Position  sitting in chair/recliner  -CZ     Post Treatment Position   "chair  -CZ       User Key  (r) = Recorded By, (t) = Taken By, (c) = Cosigned By    Initials Name Provider Type    CZ Saud Pemberton, PT Physical Therapist        Outcome Measures     Row Name 05/07/20 0848          How much help from another person do you currently need...    Turning from your back to your side while in flat bed without using bedrails?  3  -CZ     Moving from lying on back to sitting on the side of a flat bed without bedrails?  3  -CZ     Moving to and from a bed to a chair (including a wheelchair)?  3  -CZ     Standing up from a chair using your arms (e.g., wheelchair, bedside chair)?  3  -CZ     Climbing 3-5 steps with a railing?  3  -CZ     To walk in hospital room?  3  -CZ     AM-PAC 6 Clicks Score (PT)  18  -CZ     Row Name 05/07/20 0848          Tinetti Assessment    Tinetti Assessment  yes  -CZ     Sitting Balance  1  -CZ     Arises  2  -CZ     Attempts to Rise  2  -CZ     Immediate Standing Balance (first 5 sec)  2  -CZ     Standing Balance  1  -CZ     Sternal Nudge (feet close together)  2  -CZ     Eyes Closed (feet close together)  1  -CZ     Turning 360 Degrees- Steps  0  -CZ     Turning 360 Degrees- Steadiness  1  -CZ     Sitting Down  2  -CZ     Tinetti Balance Score  14  -CZ     Gait Initiation (immediate after told \"go\")  1  -CZ     Step Length- Right Swing  1  -CZ     Step Length- Left Swing  1  -CZ     Foot Clearance- Right Foot  1  -CZ     Foot Clearance- Left Foot  1  -CZ     Step Symmetry  0  -CZ     Step Continuity  1  -CZ     Path (excursion)  1  -CZ     Trunk  2  -CZ     Base of Support  1  -CZ     Gait Score  10  -CZ     Tinetti Total Score  24  -CZ     Row Name 05/07/20 0848          Functional Assessment    Outcome Measure Options  AM-PAC 6 Clicks Basic Mobility (PT);Tinetti  -CZ       User Key  (r) = Recorded By, (t) = Taken By, (c) = Cosigned By    Initials Name Provider Type    Saud Camarena, PT Physical Therapist        Physical Therapy Education                 " Title: PT OT SLP Therapies (In Progress)     Topic: Physical Therapy (In Progress)     Point: Mobility training (Not Started)     Description:   Instruct learner(s) on safety and technique for assisting patient out of bed, chair or wheelchair.  Instruct in the proper use of assistive devices, such as walker, crutches, cane or brace.              Patient Friendly Description:   It's important to get you on your feet again, but we need to do so in a way that is safe for you. Falling has serious consequences, and your personal safety is the most important thing of all.        When it's time to get out of bed, one of us or a family member will sit next to you on the bed to give you support.     If your doctor or nurse tells you to use a walker, crutches, a cane, or a brace, be sure you use it every time you get out of bed, even if you think you don't need it.    Learner Progress:   Not documented in this visit.          Point: Home exercise program (Not Started)     Description:   Instruct learner(s) on appropriate technique for monitoring, assisting and/or progressing patient with therapeutic exercises and activities.              Learner Progress:   Not documented in this visit.          Point: Body mechanics (Not Started)     Description:   Instruct learner(s) on proper positioning and spine alignment for patient and/or caregiver during mobility tasks and/or exercises.              Learner Progress:   Not documented in this visit.          Point: Precautions (In Progress)     Description:   Instruct learner(s) on prescribed precautions during mobility and gait tasks              Learning Progress Summary           Patient Acceptance, E, NR by WDIGHT at 5/7/2020 1217    Comment:  Zach assessed: 24/28 or low fall risk; FWW not necessary.  POC will work toward improving gait mechanics and further lowering fall risk.                               User Key     Initials Effective Dates Name Provider Type Discipline    DWIGHT  04/03/18 -  Saud Pemberton, PT Physical Therapist PT              PT Recommendation and Plan  Planned Therapy Interventions (PT Eval): balance training, strengthening, stretching, patient/family education, stair training  Outcome Summary/Treatment Plan (PT)  Anticipated Discharge Disposition (PT): home  Plan of Care Reviewed With: patient  Outcome Summary: Initial PT evaluation complete; co-evaluation with OT.  Patient is alert and cooperative.  He requires SPV with transfers, CGA with gait, ambulating 150'x1 without an AD but slow milagro, decreased step length and toe-off.  Tinetti assessed: 24/28 or low fall risk; FWW not necessary.  PT POC to concentrate on improving gait mechanics and further lowering fall risk. Patient is not likely to need a FWW upon discharge, is not likely to need HHPT either.  Goals established, continue skilled PT.     Time Calculation:   PT Charges     Row Name 05/07/20 1223             Time Calculation    Start Time  0848  -CZ      Stop Time  0919  -CZ      Time Calculation (min)  31 min  -CZ      PT Received On  05/07/20  -CZ      PT Goal Re-Cert Due Date  05/20/20  -CZ        User Key  (r) = Recorded By, (t) = Taken By, (c) = Cosigned By    Initials Name Provider Type    CZ Saud Pemberton, PT Physical Therapist        Therapy Charges for Today     Code Description Service Date Service Provider Modifiers Qty    [id number]  PT EVAL MOD COMPLEXITY 2 5/7/2020 Saud Pemberton, PT GP 1          PT G-Codes  Outcome Measure Options: AM-PAC 6 Clicks Daily Activity (OT)  AM-PAC 6 Clicks Score (PT): 18  AM-PAC 6 Clicks Score (OT): 21  Tinetti Total Score: 24    Saud Pemberton PT  5/7/2020

## 2020-05-07 NOTE — PLAN OF CARE
Problem: Patient Care Overview  Goal: Plan of Care Review  Outcome: Ongoing (interventions implemented as appropriate)  Flowsheets  Taken 5/7/2020 0520  Progress: improving  Outcome Summary: VSS through shift on RA. No complaints of pain or discomfort. Patient is eating and ambulating well. Continuing to monitor.  Taken 5/6/2020 2000  Plan of Care Reviewed With: patient

## 2020-05-07 NOTE — CONSULTS
"Adult Nutrition  Assessment    Patient Name:  Timmy Diop  YOB: 1960  MRN: [id number]  Admit Date:  5/3/2020    Assessment Date:  5/7/2020    Comments:  Pt taking po diet w/o difficulty. Reports good appetite, no gi distress. Slightly elevated Bg labs noted. RD will monitor.    Reason for Assessment     Row Name 05/07/20 1008          Reason for Assessment    Reason For Assessment  follow-up protocol         Nutrition/Diet History     Row Name 05/07/20 1008          Nutrition/Diet History    Typical Food/Fluid Intake  Pt sitting up in chair and say his breakfast was \"excellent\". Says he could not eat it all do to filling up too fast, but otherwise no issues/requests.          Anthropometrics     Row Name 05/07/20 0500          Anthropometrics    Weight  70.6 kg (155 lb 11.2 oz)         Labs/Tests/Procedures/Meds     Row Name 05/07/20 1009          Labs/Procedures/Meds    Lab Results Reviewed  reviewed, pertinent     Lab Results Comments  Bg [id number]             Nutrition Prescription Ordered     Row Name 05/07/20 1009          Nutrition Prescription PO    Current PO Diet  Soft Texture     Texture  Chopped         Evaluation of Received Nutrient/Fluid Intake     Row Name 05/07/20 1010          PO Evaluation    Number of Meals  1     % PO Intake  75               Electronically signed by:  Deloris Bell RD  05/07/20 10:12  "

## 2020-05-08 ENCOUNTER — READMISSION MANAGEMENT (OUTPATIENT)
Dept: CALL CENTER | Facility: HOSPITAL | Age: 60
End: 2020-05-08

## 2020-05-08 VITALS
WEIGHT: 155.2 LBS | HEART RATE: 80 BPM | BODY MASS INDEX: 26.5 KG/M2 | OXYGEN SATURATION: 92 % | TEMPERATURE: 96 F | HEIGHT: 64 IN | SYSTOLIC BLOOD PRESSURE: 133 MMHG | RESPIRATION RATE: 18 BRPM | DIASTOLIC BLOOD PRESSURE: 81 MMHG

## 2020-05-08 LAB
ANION GAP SERPL CALCULATED.3IONS-SCNC: 10 MMOL/L (ref 5–15)
BACTERIA SPEC AEROBE CULT: NORMAL
BACTERIA SPEC AEROBE CULT: NORMAL
BASOPHILS # BLD AUTO: 0.02 10*3/MM3 (ref 0–0.2)
BASOPHILS NFR BLD AUTO: 0.1 % (ref 0–1.5)
BUN BLD-MCNC: 20 MG/DL (ref 6–20)
BUN/CREAT SERPL: 27.8 (ref 7–25)
CALCIUM SPEC-SCNC: 9.3 MG/DL (ref 8.6–10.5)
CHLORIDE SERPL-SCNC: 102 MMOL/L (ref 98–107)
CO2 SERPL-SCNC: 31 MMOL/L (ref 22–29)
CREAT BLD-MCNC: 0.72 MG/DL (ref 0.76–1.27)
DEPRECATED RDW RBC AUTO: 45 FL (ref 37–54)
EOSINOPHIL # BLD AUTO: 0 10*3/MM3 (ref 0–0.4)
EOSINOPHIL NFR BLD AUTO: 0 % (ref 0.3–6.2)
ERYTHROCYTE [DISTWIDTH] IN BLOOD BY AUTOMATED COUNT: 13.2 % (ref 12.3–15.4)
GFR SERPL CREATININE-BSD FRML MDRD: 112 ML/MIN/1.73
GLUCOSE BLD-MCNC: 121 MG/DL (ref 65–99)
HCT VFR BLD AUTO: 48.7 % (ref 37.5–51)
HGB BLD-MCNC: 16 G/DL (ref 13–17.7)
IMM GRANULOCYTES # BLD AUTO: 0.11 10*3/MM3 (ref 0–0.05)
IMM GRANULOCYTES NFR BLD AUTO: 0.7 % (ref 0–0.5)
LYMPHOCYTES # BLD AUTO: 1.34 10*3/MM3 (ref 0.7–3.1)
LYMPHOCYTES NFR BLD AUTO: 8.8 % (ref 19.6–45.3)
MCH RBC QN AUTO: 30.9 PG (ref 26.6–33)
MCHC RBC AUTO-ENTMCNC: 32.9 G/DL (ref 31.5–35.7)
MCV RBC AUTO: 94.2 FL (ref 79–97)
MONOCYTES # BLD AUTO: 1.19 10*3/MM3 (ref 0.1–0.9)
MONOCYTES NFR BLD AUTO: 7.8 % (ref 5–12)
NEUTROPHILS # BLD AUTO: 12.57 10*3/MM3 (ref 1.7–7)
NEUTROPHILS NFR BLD AUTO: 82.6 % (ref 42.7–76)
NRBC BLD AUTO-RTO: 0 /100 WBC (ref 0–0.2)
PLATELET # BLD AUTO: 319 10*3/MM3 (ref 140–450)
PMV BLD AUTO: 10.3 FL (ref 6–12)
POTASSIUM BLD-SCNC: 5.1 MMOL/L (ref 3.5–5.2)
RBC # BLD AUTO: 5.17 10*6/MM3 (ref 4.14–5.8)
SODIUM BLD-SCNC: 143 MMOL/L (ref 136–145)
WBC NRBC COR # BLD: 15.23 10*3/MM3 (ref 3.4–10.8)

## 2020-05-08 PROCEDURE — 97530 THERAPEUTIC ACTIVITIES: CPT

## 2020-05-08 PROCEDURE — 97110 THERAPEUTIC EXERCISES: CPT

## 2020-05-08 PROCEDURE — 25010000002 ENOXAPARIN PER 10 MG: Performed by: INTERNAL MEDICINE

## 2020-05-08 PROCEDURE — 63710000001 PREDNISONE PER 1 MG: Performed by: INTERNAL MEDICINE

## 2020-05-08 PROCEDURE — 85025 COMPLETE CBC W/AUTO DIFF WBC: CPT | Performed by: HOSPITALIST

## 2020-05-08 PROCEDURE — 94799 UNLISTED PULMONARY SVC/PX: CPT

## 2020-05-08 PROCEDURE — 80048 BASIC METABOLIC PNL TOTAL CA: CPT | Performed by: HOSPITALIST

## 2020-05-08 RX ORDER — PREDNISONE 10 MG/1
TABLET ORAL
Qty: 21 TABLET | Refills: 0 | Status: SHIPPED | OUTPATIENT
Start: 2020-05-08 | End: 2020-08-31

## 2020-05-08 RX ADMIN — ALBUTEROL SULFATE 2 PUFF: 90 AEROSOL, METERED RESPIRATORY (INHALATION) at 11:07

## 2020-05-08 RX ADMIN — ENOXAPARIN SODIUM 40 MG: 40 INJECTION SUBCUTANEOUS at 08:38

## 2020-05-08 RX ADMIN — PREDNISONE 60 MG: 20 TABLET ORAL at 08:38

## 2020-05-08 RX ADMIN — ALBUTEROL SULFATE 2 PUFF: 90 AEROSOL, METERED RESPIRATORY (INHALATION) at 07:38

## 2020-05-08 RX ADMIN — SODIUM CHLORIDE, PRESERVATIVE FREE 10 ML: 5 INJECTION INTRAVENOUS at 08:38

## 2020-05-08 RX ADMIN — FAMOTIDINE 20 MG: 10 INJECTION INTRAVENOUS at 08:38

## 2020-05-08 NOTE — THERAPY TREATMENT NOTE
Acute Care - Occupational Therapy Treatment Note  [facility]     Patient Name: Timmy Diop  : 1960  MRN: [id number]  Today's Date: 2020     Date of Referral to OT: 20       Admit Date: 5/3/2020       ICD-10-CM ICD-9-CM   1. Acute respiratory failure with hypoxia and hypercapnia (CMS/[facility]) J96.01 518.81    J96.02    2. Pneumonia of both lower lobes due to infectious organism (CMS/[facility]) J18.1 483.8   3. Sepsis, due to unspecified organism, unspecified whether acute organ dysfunction present (CMS/[facility]) A41.9 038.9     995.91   4. Impaired mobility and activities of daily living Z74.09 799.89   5. Impaired physical mobility Z74.09 781.99     Patient Active Problem List   Diagnosis   • History of dermatomyositis   • Rotator cuff tear   • Contact dermatitis   • Acute respiratory failure with hypoxia and hypercapnia (CMS/[facility])     Past Medical History:   Diagnosis Date   • Asthma    • COPD (chronic obstructive pulmonary disease) (CMS/[facility])    • Erysipelas of lower extremity      Past Surgical History:   Procedure Laterality Date   • INCISION AND DRAINAGE ABSCESS      right lateral chest wall   • ROTATOR CUFF REPAIR Bilateral     Dr Fajardo       Therapy Treatment    Rehabilitation Treatment Summary     Row Name 20 0845             Treatment Time/Intention    Discipline  occupational therapy assistant  -KAN      Document Type  therapy note (daily note)  -KAN      Subjective Information  no complaints  -KAN      Mode of Treatment  individual therapy;occupational therapy  -KAN      Care Plan Review  care plan/treatment goals reviewed;risks/benefits reviewed;current/potential barriers reviewed;patient/other agree to care plan  -KAN      Therapy Frequency (PT Clinical Impression)  daily  -KAN      Therapy Frequency (OT Eval)  -- 5-7x/wk  -KAN      Patient Effort  good  -KAN      Equipment Issued to Patient  -- Pt deferred gait belt and walker  -KAN      Recorded by [KAN] Sherry Sanchez OTA 20 1020       Row Name 05/08/20 0845             Vital Signs    Pre Systolic BP Rehab  136  -KAN      Pre Treatment Diastolic BP  81  -KAN      Post Systolic BP Rehab  146  -KAN      Post Treatment Diastolic BP  100  -KAN      Pretreatment Heart Rate (beats/min)  87  -KAN      Posttreatment Heart Rate (beats/min)  104  -KAN      Pre SpO2 (%)  94  -KAN      O2 Delivery Pre Treatment  room air  -KAN      Post SpO2 (%)  95  -KAN      O2 Delivery Post Treatment  room air  -KAN      Pre Patient Position  Supine  -KAN      Intra Patient Position  Standing  -KAN      Post Patient Position  Supine  -KAN      Recorded by [KAN] Sherry Sanchez [facility] 05/08/20 1023      Row Name 05/08/20 0845             Cognitive Assessment/Intervention- PT/OT    Orientation Status (Cognition)  oriented x 4  -KAN      Recorded by [KAN] Sherry Sanchez [facility] 05/08/20 1023      [facility] Name 05/08/20 0845             Bed Mobility Assessment/Treatment    Bed Mobility Assessment/Treatment  supine-sit-supine  -KAN      Supine-Sit-Supine [place] (Bed Mobility)  conditional independence  -KAN      Recorded by [KAN] Sherry Sanchez [facility] 05/08/20 1023      Row Name 05/08/20 0845             Functional Mobility    Functional Mobility- Ind. Level  independent  -KAN      Functional Mobility-Distance (Feet)  250  -KAN      Recorded by [KAN] Sherry Sanchez [facility] 05/08/20 1023      Row Name 05/08/20 0845             ADL Assessment/Intervention    Additional Documentation  -- Pt deferred ADL's  -KAN      Recorded by [KAN] Sherry Sanchez [facility] 05/08/20 1023      [facility] Name 05/08/20 0845             Motor Skills Assessment/Interventions    Additional Documentation  Therapeutic Exercise Interventions (Group);Therapeutic Exercise (Group)  -KAN      Recorded by [KAN] Sherry Sanchez [facility] 05/08/20 1023      Row Name 05/08/20 0845             Therapeutic Exercise    Upper Extremity (Therapeutic Exercise)  bicep curl, bilateral;tricep extension, bilateral  -KAN      Weight/Resistance (Therapeutic Exercise)  yellow   -KAN      Exercise Type (Therapeutic Exercise)  AROM (active range of motion)  -KAN      Position (Therapeutic Exercise)  seated  -KAN      Sets/Reps (Therapeutic Exercise)  1x15  -KAN      Equipment (Therapeutic Exercise)  resistive bands  -KAN      Expected Outcome (Therapeutic Exercise)  improve performance, BADLs;improve functional tolerance, self-care activity;improve performance, transfer skills  -KAN      Comment (Therapeutic Exercise)  -- Pt unable to perform LUE punch outs secondary to IV placemnt  -KAN      Recorded by [KAN] Sherry Sanchez OTA 05/08/20 1023      Row Name 05/08/20 0845             Positioning and Restraints    Pre-Treatment Position  in bed  -KAN      Post Treatment Position  bed  -KAN      In Bed  sitting;call light within reach;encouraged to call for assist;side rails up x2  -KAN      Recorded by [KAN] Sherry Sanchez OTA 05/08/20 1023      Row Name 05/08/20 0845             Pain Scale: Numbers Pre/Post-Treatment    Pain Scale: Numbers, Pretreatment  0/10 - no pain  -KAN      Pain Scale: Numbers, Post-Treatment  0/10 - no pain  -KAN      Recorded by [KAN] Sherry Sanchez OTA 05/08/20 1023      Row Name 05/08/20 0845             Coping    Observed Emotional State  calm;cooperative  -KAN      Verbalized Emotional State  acceptance  -KAN      Recorded by [KAN] Sherry Sanchez OTA 05/08/20 1023      Row Name 05/08/20 0845             Plan of Care Review    Plan of Care Reviewed With  patient  -KAN      Recorded by [KAN] Sherry Sanchez OTA 05/08/20 1023      Row Name 05/08/20 0845             Outcome Summary/Treatment Plan (OT)    Daily Summary of Progress (OT)  progress toward functional goals as expected  -KAN      Anticipated Discharge Disposition (OT)  anticipate therapy at next level of care  -KAN      Recorded by [KAN] Sherry Sanchez OTA 05/08/20 1023        User Key  (r) = Recorded By, (t) = Taken By, (c) = Cosigned By    Initials Name Effective Dates Discipline    Sherry López OTA 11/05/19 -  OT         Rash 05/03/20 2300 Left anterior leg patch;plaque (Active)   Distribution localized 5/8/2020  8:00 AM   Borders distinct;raised 5/8/2020  8:00 AM   Characteristics dry 5/8/2020  8:00 AM   Color gray;pink 5/8/2020  8:00 AM     Rehab Goal Summary     Row Name 05/08/20 0845             Occupational Therapy Goals    Transfer Goal Selection (OT)  transfer, OT goal 1  -KAN      Bathing Goal Selection (OT)  bathing, OT goal 1  -KAN      Dressing Goal Selection (OT)  dressing, OT goal 1  -KAN      Toileting Goal Selection (OT)  toileting, OT goal 1  -KAN      Activity Tolerance Goal Selection (OT)  activity tolerance, OT goal 1  -KAN         Transfer Goal 1 (OT)    Activity/Assistive Device (Transfer Goal 1, OT)  transfers, all  -KAN      [place] Level/Cues Needed (Transfer Goal 1, OT)  independent  -KAN      Time Frame (Transfer Goal 1, OT)  long term goal (LTG)  -KAN      Progress/Outcome (Transfer Goal 1, OT)  goal not met  -KAN         Bathing Goal 1 (OT)    Activity/Assistive Device (Bathing Goal 1, OT)  bathing skills, all  -KAN      [place] Level/Cues Needed (Bathing Goal 1, OT)  conditional independence  -KAN      Time Frame (Bathing Goal 1, OT)  long term goal (LTG)  -KAN      Progress/Outcomes (Bathing Goal 1, OT)  goal not met  -KAN         Dressing Goal 1 (OT)    Activity/Assistive Device (Dressing Goal 1, OT)  dressing skills, all  -KAN      [place]/Cues Needed (Dressing Goal 1, OT)  conditional independence  -KAN      Time Frame (Dressing Goal 1, OT)  long term goal (LTG)  -KAN      Progress/Outcome (Dressing Goal 1, OT)  goal not met  -KAN         Toileting Goal 1 (OT)    Activity/Device (Toileting Goal 1, OT)  toileting skills, all  -KAN      [place] Level/Cues Needed (Toileting Goal 1, OT)  independent  -KAN      Time Frame (Toileting Goal 1, OT)  long term goal (LTG)  -KAN      Progress/Outcome (Toileting Goal 1, OT)  goal not met  -AKN          Activity Tolerance Goal 1 (OT)    Activity Level  (Endurance Goal 1, OT)  15 min activity  -KAN      Progress/Outcome (Activity Tolerance Goal 1, OT)  (S) goal met  -KAN         Patient Education Goal (OT)    Activity (Patient Education Goal, OT)  Home safety/fall prev and EC/WS.  -KAN      [place]/Cues/Accuracy (Memory Goal 2, OT)  demonstrates adequately;verbalizes understanding  -KAN      Time Frame (Patient Education Goal, OT)  long term goal (LTG)  -KAN      Progress/Outcome (Patient Education Goal, OT)  goal not met  -KAN        User Key  (r) = Recorded By, (t) = Taken By, (c) = Cosigned By    Initials Name Provider Type Discipline    Sherry López OTA Occupational Therapy Assistant OT        Occupational Therapy Education                 Title: PT OT SLP Therapies (In Progress)     Topic: Occupational Therapy (In Progress)     Point: ADL training (Not Started)     Description:   Instruct learner(s) on proper safety adaptation and remediation techniques during self care or transfers.   Instruct in proper use of assistive devices.              Learner Progress:   Not documented in this visit.          Point: Home exercise program (Not Started)     Description:   Instruct learner(s) on appropriate technique for monitoring, assisting and/or progressing therapeutic exercises/activities.              Learner Progress:   Not documented in this visit.          Point: Precautions (Done)     Description:   Instruct learner(s) on prescribed precautions during self-care and functional transfers.              Learning Progress Summary           Patient Acceptance, E, VU by FERNIE at 5/7/2020 1143    Comment:  Edu pt on use of gait belt and non skid socks when OOB and no OOB without assist.                   Point: Body mechanics (Done)     Description:   Instruct learner(s) on proper positioning and spine alignment during self-care, functional mobility activities and/or exercises.              Learning Progress Summary           Patient Acceptance, E, VU,DU by KAN at  5/8/2020 1025    Comment:  Pt educated on OT and POC. Pt educated on proper body mechanics when performing BUE ther ex.                               User Key     Initials Effective Dates Name Provider Type Discipline    RB 07/24/19 -  Quang Liu OT Occupational Therapist OT    KAN 11/05/19 -  Sherry Sanchez OTA Occupational Therapy Assistant OT                OT Recommendation and Plan  Outcome Summary/Treatment Plan (OT)  Daily Summary of Progress (OT): progress toward functional goals as expected  Anticipated Discharge Disposition (OT): anticipate therapy at next level of care  Therapy Frequency (OT Eval): (5-7x/wk)  Daily Summary of Progress (OT): progress toward functional goals as expected  Plan of Care Review  Plan of Care Reviewed With: patient  Plan of Care Reviewed With: patient  Outcome Summary: Pt sup with HOB elevated and agreed to OT. Pt deferred ADL's stating that he had already done them. Pt agreed to BUE ther ex sitting EOB 1x15 with yellow theraband. Pt sup<>sit<>sup cond I with use of bed rails and HOB elevated. Pt requested to perform fx'al mobility, deferring gait belt and walker. Pt performed ~250' of fx'al mobility with supervision and no LOB.  Outcome Measures     Row Name 05/08/20 0845 05/07/20 0849          How much help from another is currently needed...    Putting on and taking off regular lower body clothing?  3  -KAN  3  -RB     Bathing (including washing, rinsing, and drying)  3  -KAN  3  -RB     Toileting (which includes using toilet bed pan or urinal)  3  -KAN  3  -RB     Putting on and taking off regular upper body clothing  4  -KAN  4  -RB     Taking care of personal grooming (such as brushing teeth)  4  -KAN  4  -RB     Eating meals  4  -KAN  4  -RB     AM-PAC 6 Clicks Score (OT)  21  -KAN  21  -RB        Functional Assessment    Outcome Measure Options  AM-PAC 6 Clicks Daily Activity (OT)  -KAN  AM-PAC 6 Clicks Daily Activity (OT)  -RB       User Key  (r) = Recorded By, (t) = Taken  By, (c) = Cosigned By    Initials Name Provider Type    RB Quang Liu, OT Occupational Therapist    Sherry López OTA Occupational Therapy Assistant           Time Calculation:   Time Calculation- OT     Row Name 05/08/20 1031             Time Calculation- OT    OT Start Time  0845  -KAN      OT Stop Time  0923  -KAN      OT Time Calculation (min)  38 min  -KAN      Total Timed Code Minutes- OT  38 minute(s)  -KAN      OT Received On  05/08/20  -KAN        User Key  (r) = Recorded By, (t) = Taken By, (c) = Cosigned By    Initials Name Provider Type    Sherry López OTA Occupational Therapy Assistant        Therapy Charges for Today     Code Description Service Date Service Provider Modifiers Qty    24687350528 HC OT THERAPEUTIC ACT EA 15 MIN 5/8/2020 Sherry Sanchez OTA GO 1    81761295965 HC OT THER PROC EA 15 MIN 5/8/2020 Sherry Sanchez OTA GO 2               JABIER Baig  5/8/2020

## 2020-05-08 NOTE — THERAPY TREATMENT NOTE
Acute Care - Physical Therapy Treatment Note  [facility]     Patient Name: Timmy Diop  : 1960  MRN: [id number]  Today's Date: 2020             Admit Date: 5/3/2020    Visit Dx:    ICD-10-CM ICD-9-CM   1. Acute respiratory failure with hypoxia and hypercapnia (CMS/[facility]) J96.01 518.81    J96.02    2. Pneumonia of both lower lobes due to infectious organism (CMS/[facility]) J18.1 483.8   3. Sepsis, due to unspecified organism, unspecified whether acute organ dysfunction present (CMS/[facility]) A41.9 038.9     995.91   4. Impaired mobility and activities of daily living Z74.09 799.89   5. Impaired physical mobility Z74.09 781.99     Patient Active Problem List   Diagnosis   • History of dermatomyositis   • Rotator cuff tear   • Contact dermatitis   • Acute respiratory failure with hypoxia and hypercapnia (CMS/[facility])       Therapy Treatment    Rehabilitation Treatment Summary     Row Name 20 0954 20 0845          Treatment Time/Intention    Discipline  physical therapy assistant  -EM  occupational therapy assistant  -KAN     Document Type  therapy note (daily note)  -EM  therapy note (daily note)  -KAN     Subjective Information  no complaints  -EM  no complaints  -KAN     Mode of Treatment  individual therapy;physical therapy  -EM  individual therapy;occupational therapy  -KAN     Care Plan Review  --  care plan/treatment goals reviewed;risks/benefits reviewed;current/potential barriers reviewed;patient/other agree to care plan  -KAN     Therapy Frequency (PT Clinical Impression)  daily  -EM  daily  -KAN     Therapy Frequency (OT Eval)  --  -- 5-7x/wk  -KAN     Patient Effort  good  -EM  good  -KAN     Comment  Pt declined gt due to just finishing walk with OT. PT agreeable for bed mobility and therex  -EM  --     Equipment Issued to Patient  --  -- Pt deferred gait belt and walker  -KAN     Recorded by [EM] Rob Avelar PTA 20 1311 [KAN] Sherry Sanchez OTA 20 1023     Row Name 20 0954  05/08/20 0845          Vital Signs    Pre Systolic BP Rehab  120  -EM  136  -KAN     Pre Treatment Diastolic BP  66  -EM  81  -KAN     Post Systolic BP Rehab  136  -EM  146  -KAN     Post Treatment Diastolic BP  77  -EM  100  -KAN     Pretreatment Heart Rate (beats/min)  82  -EM  87  -KAN     Posttreatment Heart Rate (beats/min)  84  -EM  104  -KAN     Pre SpO2 (%)  90  -EM  94  -KAN     O2 Delivery Pre Treatment  room air  -EM  room air  -KAN     Post SpO2 (%)  994  -EM  95  -KAN     O2 Delivery Post Treatment  room air  -EM  room air  -KAN     Pre Patient Position  Supine  -EM  Supine  -KAN     Intra Patient Position  Sitting  -EM  Standing  -KAN     Post Patient Position  Supine  -EM  Supine  -KAN     Recorded by [EM] Rob Avelar PTA 05/08/20 1311 [KAN] Sherry Sanchez OTA 05/08/20 1023     Row Name 05/08/20 0954 05/08/20 0845          Cognitive Assessment/Intervention- PT/OT    Orientation Status (Cognition)  oriented x 4  -EM  oriented x 4  -KAN     Recorded by [EM] Rob Avelar PTA 05/08/20 1311 [KAN] Sherry Sanchez OTA 05/08/20 1023     Row Name 05/08/20 0954 05/08/20 0845          Bed Mobility Assessment/Treatment    Bed Mobility Assessment/Treatment  --  supine-sit-supine  -KAN     Supine-Sit [place] (Bed Mobility)  independent  -EM  --     Sit-Supine [place] (Bed Mobility)  independent  -EM  --     Supine-Sit-Supine [place] (Bed Mobility)  --  -EM  conditional independence  -KAN     Recorded by [EM] Rob Avelar PTA 05/08/20 1311 [KAN] Sherry Sanchez OTA 05/08/20 1023     Row Name 05/08/20 0845             Functional Mobility    Functional Mobility- Ind. Level  independent  -KAN      Functional Mobility-Distance (Feet)  250  -KAN      Recorded by [KAN] Sherry Sanchez OTA 05/08/20 1023      Row Name 05/08/20 0954             Sit-Stand Transfer    Sit-Stand [place] (Transfers)  independent  -EM      Recorded by [EM] Rob Avelar PTA 05/08/20 1311      Row Name 05/08/20 0954              Stand-Sit Transfer    Stand-Sit [place] (Transfers)  independent  -EM      Recorded by [EM] Rob Avelar, PTA 05/08/20 1311      Row Name 05/08/20 0845             ADL Assessment/Intervention    Additional Documentation  -- Pt deferred ADL's  -KAN      Recorded by [KAN] Sherry Sanchez \[facility]\"" 05/08/20 1023      Row Name 05/08/20 0845             Motor Skills Assessment/Interventions    Additional Documentation  Therapeutic Exercise Interventions (Group);Therapeutic Exercise (Group)  -KAN      Recorded by [KAN] Sherry Sanchez \[facility]\"" 05/08/20 1023      Row Name 05/08/20 0954 05/08/20 0845          Therapeutic Exercise    Upper Extremity (Therapeutic Exercise)  --  bicep curl, bilateral;tricep extension, bilateral  -KAN     Lower Extremity (Therapeutic Exercise)  SLR (straight leg raise), bilateral;SAQ (short arc quad), bilateral;quad sets, bilateral;marching while seated;LAQ (long arc quad), bilateral;other (see comments) AP, Add Pillow Squeeze  -EM  --     Weight/Resistance (Therapeutic Exercise)  --  yellow  -KAN     Exercise Type (Therapeutic Exercise)  AROM (active range of motion)  -EM  AROM (active range of motion)  -KAN     Position (Therapeutic Exercise)  supine;seated  -EM  seated  -KAN     Sets/Reps (Therapeutic Exercise)  1x30  -EM  1x15  -KAN     Equipment (Therapeutic Exercise)  --  resistive bands  -KAN     Expected Outcome (Therapeutic Exercise)  --  improve performance, BADLs;improve functional tolerance, self-care activity;improve performance, transfer skills  -KAN     Comment (Therapeutic Exercise)  --  -- Pt unable to perform LUE punch outs secondary to IV placemnt  -KAN     Recorded by [EM] Rob Aevlar, PTA 05/08/20 1311 [KAN] Sherry Sanchez \[facility]\"" 05/08/20 1023     Row Name 05/08/20 0954 05/08/20 0845          Positioning and Restraints    Pre-Treatment Position  sitting in chair/recliner  -EM  in bed  -KAN     Post Treatment Position  bed  -EM  bed  -KAN     In Bed  sitting EOB;call light within  reach;encouraged to call for assist  -EM  sitting;call light within reach;encouraged to call for assist;side rails up x2  -KAN     Recorded by [EM] Rob Avelar, PTA 05/08/20 1311 [KAN] Sherry Sanchez OTA 05/08/20 1023     Row Name 05/08/20 0954 05/08/20 0845          Pain Scale: Numbers Pre/Post-Treatment    Pain Scale: Numbers, Pretreatment  0/10 - no pain  -EM  0/10 - no pain  -KAN     Pain Scale: Numbers, Post-Treatment  0/10 - no pain  -EM  0/10 - no pain  -KAN     Recorded by [EM] Rob Avelar, PTA 05/08/20 1311 [KAN] Sherry Sanchez OTA 05/08/20 1023     Row Name 05/08/20 0845             Coping    Observed Emotional State  calm;cooperative  -KAN      Verbalized Emotional State  acceptance  -KAN      Recorded by [KAN] Sherry Sanchez OTA 05/08/20 1023      Row Name 05/08/20 0845             Plan of Care Review    Plan of Care Reviewed With  patient  -KAN      Recorded by [KAN] Sherry Sanchez OTA 05/08/20 1023      Row Name 05/08/20 0845             Outcome Summary/Treatment Plan (OT)    Daily Summary of Progress (OT)  progress toward functional goals as expected  -KNA      Anticipated Discharge Disposition (OT)  anticipate therapy at next level of care  -KAN      Recorded by [KAN] Sherry Sanchez OTA 05/08/20 1023      Row Name 05/08/20 0954             Outcome Summary/Treatment Plan (PT)    Daily Summary of Progress (PT)  progress toward functional goals as expected  -EM      Plan for Continued Treatment (PT)  Continue  -EM      Anticipated Discharge Disposition (PT)  home  -EM      Recorded by [EM] Rob Avelar, PTA 05/08/20 1311        User Key  (r) = Recorded By, (t) = Taken By, (c) = Cosigned By    Initials Name Effective Dates Discipline    EM Rob Avelar, PTA 08/11/15 -  PT    KAN Sherry Sanchez OTA 11/05/19 -  OT          Rash 05/03/20 2300 Left anterior leg patch;plaque (Active)   Distribution localized 5/8/2020  8:00 AM   Borders distinct;raised 5/8/2020  8:00 AM   Characteristics dry 5/8/2020  8:00  AM   Color gray;pink 5/8/2020  8:00 AM       Rehab Goal Summary     Row Name 05/08/20 0954 05/08/20 0845          Bed Mobility Goal 1 (PT)    Activity/Assistive Device (Bed Mobility Goal 1, PT)  sit to supine/supine to sit  -EM  --     [place] Level/Cues Needed (Bed Mobility Goal 1, PT)  independent  -EM  --     Time Frame (Bed Mobility Goal 1, PT)  long term goal (LTG);by discharge  -EM  --     Barriers (Bed Mobility Goal 1, PT)  HOB flat, no bed rails.   -EM  --     Progress/Outcomes (Bed Mobility Goal 1, PT)  (S) goal met  -EM  --        Transfer Goal 1 (PT)    Activity/Assistive Device (Transfer Goal 1, PT)  sit-to-stand/stand-to-sit;bed-to-chair/chair-to-bed  -EM  --     [place] Level/Cues Needed (Transfer Goal 1, PT)  independent  -EM  --     Time Frame (Transfer Goal 1, PT)  long term goal (LTG);by discharge  -EM  --     Progress/Outcome (Transfer Goal 1, PT)  (S) goal met  -EM  --        Gait Training Goal 1 (PT)    Activity/Assistive Device (Gait Training Goal 1, PT)  gait (walking locomotion)  -EM  --     [place] Level (Gait Training Goal 1, PT)  independent  -EM  --     Distance (Gait Goal 1, PT)  300' x 2 without AD.   -EM  --     Time Frame (Gait Training Goal 1, PT)  long term goal (LTG);by discharge  -EM  --     Barriers (Gait Training Goal 1, PT)  Maintain SpO2 >90%.  Patient demonstrates slow milagro, decreased step length, decreased toe-off.   -EM  --     Progress/Outcome (Gait Training Goal 1, PT)  goal not met  -EM  --        Stairs Goal 1 (PT)    Activity/Assistive Device (Stairs Goal 1, PT)  descending stairs;ascending stairs  -EM  --     [place] Level/Cues Needed (Stairs Goal 1, PT)  conditional independence  -EM  --     Number of Stairs (Stairs Goal 1, PT)  2 steps, no railings.   -EM  --     Time Frame (Stairs Goal 1, PT)  long term goal (LTG);by discharge  -EM  --     Barriers (Stairs Goal 1, PT)  No railings at home.   -EM  --     Progress/Outcome (Stairs Goal 1, PT)   goal not met  -EM  --        Patient Education Goal (PT)    Activity (Patient Education Goal, PT)  Tinetti fall risk, score: 27/28.   -EM  --     [place]/Cues/Accuracy (Memory Goal 2, PT)  independent  -EM  --     Time Frame (Patient Education Goal, PT)  long term goal (LTG);by discharge  -EM  --     Progress/Outcome (Patient Education Goal, PT)  goal not met  -EM  --        Occupational Therapy Goals    Transfer Goal Selection (OT)  --  transfer, OT goal 1  -KAN     Bathing Goal Selection (OT)  --  bathing, OT goal 1  -KAN     Dressing Goal Selection (OT)  --  dressing, OT goal 1  -KAN     Toileting Goal Selection (OT)  --  toileting, OT goal 1  -KAN     Activity Tolerance Goal Selection (OT)  --  activity tolerance, OT goal 1  -KAN        Transfer Goal 1 (OT)    Activity/Assistive Device (Transfer Goal 1, OT)  --  transfers, all  -KAN     [place] Level/Cues Needed (Transfer Goal 1, OT)  --  independent  -KAN     Time Frame (Transfer Goal 1, OT)  --  long term goal (LTG)  -KAN     Progress/Outcome (Transfer Goal 1, OT)  --  goal not met  -KAN        Bathing Goal 1 (OT)    Activity/Assistive Device (Bathing Goal 1, OT)  --  bathing skills, all  -KAN     [place] Level/Cues Needed (Bathing Goal 1, OT)  --  conditional independence  -KAN     Time Frame (Bathing Goal 1, OT)  --  long term goal (LTG)  -KAN     Progress/Outcomes (Bathing Goal 1, OT)  --  goal not met  -KAN        Dressing Goal 1 (OT)    Activity/Assistive Device (Dressing Goal 1, OT)  --  dressing skills, all  -KAN     [place]/Cues Needed (Dressing Goal 1, OT)  --  conditional independence  -KAN     Time Frame (Dressing Goal 1, OT)  --  long term goal (LTG)  -KAN     Progress/Outcome (Dressing Goal 1, OT)  --  goal not met  -KAN        Toileting Goal 1 (OT)    Activity/Device (Toileting Goal 1, OT)  --  toileting skills, all  -KAN     [place] Level/Cues Needed (Toileting Goal 1, OT)  --  independent  -KAN     Time Frame (Toileting Goal 1, OT)  --   long term goal (LTG)  -KAN     Progress/Outcome (Toileting Goal 1, OT)  --  goal not met  -KAN         Activity Tolerance Goal 1 (OT)    Activity Level (Endurance Goal 1, OT)  --  15 min activity  -KAN     Progress/Outcome (Activity Tolerance Goal 1, OT)  --  (S) goal met  -KAN        Patient Education Goal (OT)    Activity (Patient Education Goal, OT)  --  Home safety/fall prev and EC/WS.  -KAN     [place]/Cues/Accuracy (Memory Goal 2, OT)  --  demonstrates adequately;verbalizes understanding  -KAN     Time Frame (Patient Education Goal, OT)  --  long term goal (LTG)  -KAN     Progress/Outcome (Patient Education Goal, OT)  --  goal not met  -KAN       User Key  (r) = Recorded By, (t) = Taken By, (c) = Cosigned By    Initials Name Provider Type Discipline    Rob Ryder, PTA Physical Therapy Assistant PT    Sherry López OTA Occupational Therapy Assistant OT          Physical Therapy Education                 Title: PT OT SLP Therapies (Resolved)     Topic: Physical Therapy (Resolved)     Point: Mobility training (Resolved)     Description:   Instruct learner(s) on safety and technique for assisting patient out of bed, chair or wheelchair.  Instruct in the proper use of assistive devices, such as walker, crutches, cane or brace.              Patient Friendly Description:   It's important to get you on your feet again, but we need to do so in a way that is safe for you. Falling has serious consequences, and your personal safety is the most important thing of all.        When it's time to get out of bed, one of us or a family member will sit next to you on the bed to give you support.     If your doctor or nurse tells you to use a walker, crutches, a cane, or a brace, be sure you use it every time you get out of bed, even if you think you don't need it.    Learner Progress:   Not documented in this visit.          Point: Home exercise program (Resolved)     Description:   Instruct learner(s) on appropriate  technique for monitoring, assisting and/or progressing patient with therapeutic exercises and activities.              Learner Progress:   Not documented in this visit.          Point: Body mechanics (Resolved)     Description:   Instruct learner(s) on proper positioning and spine alignment for patient and/or caregiver during mobility tasks and/or exercises.              Learner Progress:   Not documented in this visit.          Point: Precautions (Resolved)     Description:   Instruct learner(s) on prescribed precautions during mobility and gait tasks              Learning Progress Summary           Patient Acceptance, E, NR by DWIGHT at 5/7/2020 1217    Comment:  Tinetti assessed: 24/28 or low fall risk; FWW not necessary.  POC will work toward improving gait mechanics and further lowering fall risk.                               User Key     Initials Effective Dates Name Provider Type Discipline     04/03/18 -  Saud Pemberton, PT Physical Therapist PT                PT Recommendation and Plan  Anticipated Discharge Disposition (PT): home  Therapy Frequency (PT Clinical Impression): daily  Outcome Summary/Treatment Plan (PT)  Daily Summary of Progress (PT): progress toward functional goals as expected  Plan for Continued Treatment (PT): Continue  Anticipated Discharge Disposition (PT): home  Plan of Care Reviewed With: patient  Progress: improving  Outcome Summary: Pt summer tx well with good participation. Pt just finished gt with OT and is agreeable for B LE therex. Pt t/f sup-sit-sup Ind, sit-stand-sit Ind. Pt performed B LE therex x30 each in seated and supine positions. 2 goals met this tx.  Outcome Measures     Row Name 05/08/20 0845 05/07/20 0849          How much help from another is currently needed...    Putting on and taking off regular lower body clothing?  3  -KAN  3  -RB     Bathing (including washing, rinsing, and drying)  3  -KAN  3  -RB     Toileting (which includes using toilet bed pan or urinal)  3   -KAN  3  -RB     Putting on and taking off regular upper body clothing  4  -KAN  4  -RB     Taking care of personal grooming (such as brushing teeth)  4  -KAN  4  -RB     Eating meals  4  -KAN  4  -RB     AM-PAC 6 Clicks Score (OT)  21  -KAN  21  -RB        Functional Assessment    Outcome Measure Options  AM-PAC 6 Clicks Daily Activity (OT)  -KAN  AM-PAC 6 Clicks Daily Activity (OT)  -RB       User Key  (r) = Recorded By, (t) = Taken By, (c) = Cosigned By    Initials Name Provider Type    RB Quang Liu, OT Occupational Therapist    Sherry López OTA Occupational Therapy Assistant         Time Calculation:   PT Charges     Row Name 05/08/20 1314             Time Calculation    Start Time  0954  -EM      Stop Time  1023  -EM      Time Calculation (min)  29 min  -EM         Time Calculation- PT    Total Timed Code Minutes- PT  29 minute(s)  -EM        User Key  (r) = Recorded By, (t) = Taken By, (c) = Cosigned By    Initials Name Provider Type    EM Rob Avelar PTA Physical Therapy Assistant        Therapy Charges for Today     Code Description Service Date Service Provider Modifiers Qty    38547306693 HC PT THER PROC EA 15 MIN 5/8/2020 Rob Avelar PTA GP 1    24732983512 HC PT THERAPEUTIC ACT EA 15 MIN 5/8/2020 Rob Avelar PTA GP 1          PT G-Codes  Outcome Measure Options: AM-PAC 6 Clicks Daily Activity (OT)  AM-PAC 6 Clicks Score (PT): 18  AM-PAC 6 Clicks Score (OT): 21  Tinetti Total Score: 24    Rob Avelar PTA  5/8/2020

## 2020-05-08 NOTE — PLAN OF CARE
Problem: Patient Care Overview  Goal: Plan of Care Review  5/8/2020 1312 by Rob Avelar PTA  Outcome: Ongoing (interventions implemented as appropriate)  Flowsheets (Taken 5/8/2020 1312)  Progress: improving  Plan of Care Reviewed With: patient  Outcome Summary: Pt summer tx well with good participation. Pt just finished gt with OT and is agreeable for B LE therex. Pt t/f sup-sit-sup Ind, sit-stand-sit Ind. Pt performed B LE therex x30 each in seated and supine positions. 2 goals met this tx.

## 2020-05-08 NOTE — DISCHARGE SUMMARY
[facility] Medicine Services  DISCHARGE SUMMARY       Date of Admission: 5/3/2020  Date of Discharge:  5/8/2020  Primary Care Physician: Arvin Ness MD    Presenting Problem/History of Present Illness:  Acute respiratory failure with hypoxia and hypercapnia (CMS/[facility]) [J96.01, J96.02]  Pneumonia of both lower lobes due to infectious organism (CMS/[facility]) [J18.1]  Sepsis, due to unspecified organism, unspecified whether acute organ dysfunction present (CMS/[facility]) [A41.9]       Final Discharge Diagnoses:  Active Hospital Problems    Diagnosis   • Acute respiratory failure with hypoxia and hypercapnia (CMS/[facility])       Consults:   Consults     No orders found from 4/4/2020 to 5/4/2020.        Pertinent Test Results:  Lab Results (last 24 hours)     Procedure Component Value Units Date/Time    Basic Metabolic Panel [[id number]]  (Abnormal) Collected:  05/08/20 0528    Specimen:  Blood Updated:  05/08/20 0604     Glucose 121 mg/dL      BUN 20 mg/dL      Creatinine 0.72 mg/dL      Sodium 143 mmol/L      Potassium 5.1 mmol/L      Chloride 102 mmol/L      CO2 31.0 mmol/L      Calcium 9.3 mg/dL      eGFR Non African Amer 112 mL/min/1.73      BUN/Creatinine Ratio 27.8     Anion Gap 10.0 mmol/L     Narrative:       GFR Normal >60  Chronic Kidney Disease <60  Kidney Failure <15      CBC & Differential [[id number]] Collected:  05/08/20 0528    Specimen:  Blood Updated:  05/08/20 0534    Narrative:       The following orders were created for panel order CBC & Differential.  Procedure                               Abnormality         Status                     ---------                               -----------         ------                     CBC Auto Differential[[id number]]        Abnormal            Final result                 Please view results for these tests on the individual orders.    CBC Auto Differential [[id number]]  (Abnormal) Collected:  05/08/20 0528    Specimen:  Blood  Updated:  05/08/20 0534     WBC 15.23 10*3/mm3      RBC 5.17 10*6/mm3      Hemoglobin 16.0 g/dL      Hematocrit 48.7 %      MCV 94.2 fL      MCH 30.9 pg      MCHC 32.9 g/dL      RDW 13.2 %      RDW-SD 45.0 fl      MPV 10.3 fL      Platelets 319 10*3/mm3      Neutrophil % 82.6 %      Lymphocyte % 8.8 %      Monocyte % 7.8 %      Eosinophil % 0.0 %      Basophil % 0.1 %      Immature Grans % 0.7 %      Neutrophils, Absolute 12.57 10*3/mm3      Lymphocytes, Absolute 1.34 10*3/mm3      Monocytes, Absolute 1.19 10*3/mm3      Eosinophils, Absolute 0.00 10*3/mm3      Basophils, Absolute 0.02 10*3/mm3      Immature Grans, Absolute 0.11 10*3/mm3      nRBC 0.0 /100 WBC     Blood Culture - Blood, Arm, Right [[id number]] Collected:  05/03/20 2027    Specimen:  Blood from Arm, Right Updated:  05/07/20 2045     Blood Culture No growth at 4 days    Blood Culture - Blood, Arm, Right [[id number]] Collected:  05/03/20 1930    Specimen:  Blood from Arm, Right Updated:  05/07/20 1945     Blood Culture No growth at 4 days        Imaging Results (Last 24 Hours)     ** No results found for the last 24 hours. **        Chief Complaint on Day of Discharge: No complaints    Hospital Course:   Patient is a 59-year-old  male past medical history of COPD who presented to the emergency department with shortness of breath. In the emergency department he was noted to be markedly hypoxic and dyspneic. He was initially placed on a nonrebreather and given steroids and bronchodilators. This did not alleviate his symptoms. He required tracheal intubation and mechanical ventilation.  Patient was successfully extubated on 5/6/2020.  He was given a taper of oral steroids at discharge.  The patient will follow up with pulmonology in 2 weeks.  Follow with PCP in one week.      Condition on Discharge:  Stable    Physical Exam on Discharge:  /81 (BP Location: Left arm, Patient Position: Sitting)   Pulse 80   Temp 96 °F (35.6 °C) (Oral)   Resp  "18   Ht 162.6 cm (64\")   Wt 70.4 kg (155 lb 3.2 oz)   SpO2 92%   BMI 26.64 kg/m²   Physical Exam   Constitutional: He is oriented to person, place, and time. He appears well-developed and well-nourished.   HENT:   Head: Normocephalic and atraumatic.   Eyes: Pupils are equal, round, and reactive to light. EOM are normal.   Neck: Normal range of motion. Neck supple.   Cardiovascular: Normal rate and regular rhythm.   Pulmonary/Chest: Effort normal and breath sounds normal.   Abdominal: Soft. Bowel sounds are normal.   Musculoskeletal: Normal range of motion.   Neurological: He is alert and oriented to person, place, and time.   Skin: Skin is warm and dry.   Psychiatric: He has a normal mood and affect. His behavior is normal.     Discharge Disposition:  Home or Self Care    Discharge Medications:     Discharge Medications      New Medications      Instructions Start Date   predniSONE 10 MG (21) tablet pack  Commonly known as:  DELTASONE   Take as directed on package with food.         Continue These Medications      Instructions Start Date   albuterol 1.25 MG/3ML nebulizer solution  Commonly known as:  ACCUNEB   1.25 mg, Nebulization, Every 6 Hours PRN      albuterol sulfate  (90 Base) MCG/ACT inhaler  Commonly known as:  PROVENTIL HFA;VENTOLIN HFA;PROAIR HFA   2 puffs, Inhalation, Every 6 Hours PRN             Discharge Diet:   Diet Instructions     Diet: Soft Texture; Thin Liquids, No Restrictions; Chopped      Discharge Diet:  Soft Texture    Fluid Consistency:  Thin Liquids, No Restrictions    Soft Options:  Chopped          Activity at Discharge:   Activity Instructions     Activity as Tolerated            Discharge Care Plan/Instructions: As above.     Follow-up Appointment:  Follow-up Information     Arvin Ness MD Follow up in 1 week(s).    Specialties:  Family Medicine, Emergency Medicine  Contact information:  [address] [place] [address] [phone]             Cm Holbrook, " MD Follow up in 1 week(s).    Specialties:  Pulmonary Disease, Allergy  Why:  COPD/ recent admission.   Contact information:  [address] [place] [address]  [address]LO[address]  [facility] [address] [phone]                   Test Results Pending at Discharge:    Order Current Status    Blood Culture - Blood, Arm, Right Preliminary result    Blood Culture - Blood, Arm, Right Preliminary result            This document has been electronically signed by CESAR Perez on May 8, 2020 12:47        Time: Greater than 30 minutes.

## 2020-05-08 NOTE — PLAN OF CARE
Problem: Patient Care Overview  Goal: Plan of Care Review  Outcome: Ongoing (interventions implemented as appropriate)  Flowsheets (Taken 5/8/2020 1025)  Outcome Summary: Pt sup with HOB elevated and agreed to OT. Pt deferred ADL's stating that he had already done them. Pt agreed to BUE ther ex sitting EOB 1x15 with yellow theraband. Pt performed BUE there ex in all planes except LUE scapular pro/re secondary to pain from IV placement. Pt sup<>sit<>sup cond I with use of bed rails and HOB elevated. Pt requested to perform fx'al mobility, deferring gait belt and walker. Pt performed ~250' of fx'al mobility with supervision and no LOB noted.

## 2020-05-08 NOTE — PLAN OF CARE
Problem: Patient Care Overview  Goal: Plan of Care Review  Outcome: Ongoing (interventions implemented as appropriate)  Flowsheets  Taken 5/8/2020 0044  Progress: improving  Outcome Summary: Vital signs stable.  No s/s of pain or discomfort.  Will continue to monitor this pt.  Taken 5/7/2020 1948  Plan of Care Reviewed With: patient

## 2020-05-08 NOTE — OUTREACH NOTE
Prep Survey      Responses   [facility] facility patient discharged from?  [place]   Is LACE score < 7 ?  No   Eligibility  [facility]   Date of Admission  05/03/20   Date of Discharge  05/08/20   Discharge Disposition  Home or Self Care   Discharge diagnosis  Acute resp failure   COVID-19 Test Status  Negative   Does the patient have one of the following disease processes/diagnoses(primary or secondary)?  COPD/Pneumonia   Does the patient have Home health ordered?  No   Is there a DME ordered?  No   Prep survey completed?  Yes          Cecy Call RN

## 2020-05-09 ENCOUNTER — READMISSION MANAGEMENT (OUTPATIENT)
Dept: CALL CENTER | Facility: HOSPITAL | Age: 60
End: 2020-05-09

## 2020-05-09 NOTE — OUTREACH NOTE
COPD/PN Week 1 Survey      Responses   [facility]   COVID-19 Test Status  Negative   Does the patient have one of the following disease processes/diagnoses(primary or secondary)?  COPD/Pneumonia   Is there a successful TCM telephone encounter documented?  No   Was the primary reason for admission:  Pneumonia   Week 1 attempt successful?  Yes   Call start time  1441   Call end time  1447   Discharge diagnosis  Acute resp failure   Meds reviewed with patient/caregiver?  Yes   Is the patient having any side effects they believe may be caused by any medication additions or changes?  No   Does the patient have all medications ordered at discharge?  Yes   Is the patient taking all medications as directed (includes completed medication regime)?  Yes   Does the patient have a primary care provider?   Yes   Does the patient have an appointment with their PCP or pulmonologist within 7 days of discharge?  Yes   Has the patient kept scheduled appointments due by today?  N/A   Has home health visited the patient within 72 hours of discharge?  N/A   Pulse Ox monitoring  Intermittent   Pulse Ox device source  Patient   O2 Sat comments  Pt report his oxygen saturation is 95%   Did the patient receive a copy of their discharge instructions?  Yes   Nursing interventions  Reviewed instructions with patient   What is the patient's perception of their health status since discharge?  Improving   Nursing Interventions  Nurse provided patient education   Are the patient's immunizations up to date?   No   Nursing interventions  Educated on importance of maintaining up to date immunizations as advised by provider   Is the patient/caregiver able to teach back the hierarchy of who to call/visit for symptoms/problems? PCP, Specialist, Home health nurse, Urgent Care, ED, 911  Yes   Additional teach back comments  Provided education about signs and symptoms of sepsis.    Is the patient able to teach  back COPD zones?  Yes   Nursing interventions  Education provided on various zones   Patient reports what zone on this call?  Green Zone   Green Zone  Reports doing well, Breathing without shortness of breath, Usual activity and exercise level, Usual amount of phlegm/mucus without difficulty coughing up, Sleeping well, Appetite is good   Green Zone interventions:  Take daily medications, Use oxygen as prescribed, Avoid indoor/outdoor triggers, Continue regular exercise/diet plan   Is the patient/caregiver able to teach back signs and symptoms of worsening condition:  Fever/chills, Shortness of breath, Chest pain   Is the patient/caregiver able to teach back importance of completing antibiotic course of treatment?  Yes   Week 1 call completed?  Yes          Phil Marsh, RN

## 2020-05-10 ENCOUNTER — READMISSION MANAGEMENT (OUTPATIENT)
Dept: CALL CENTER | Facility: HOSPITAL | Age: 60
End: 2020-05-10

## 2020-05-10 NOTE — OUTREACH NOTE
COPD/PN Week 1 Survey      Responses   [facility]   COVID-19 Test Status  Negative   Does the patient have one of the following disease processes/diagnoses(primary or secondary)?  COPD/Pneumonia   Is there a successful TCM telephone encounter documented?  No   Was the primary reason for admission:  Pneumonia   Week 1 attempt successful?  Yes   Call start time  1157   Call end time  1201   Discharge diagnosis  Acute resp failure   Meds reviewed with patient/caregiver?  Yes   Is the patient having any side effects they believe may be caused by any medication additions or changes?  No   Does the patient have all medications ordered at discharge?  Yes   Is the patient taking all medications as directed (includes completed medication regime)?  Yes   Does the patient have a primary care provider?   Yes   Does the patient have an appointment with their PCP or pulmonologist within 7 days of discharge?  Yes   Has the patient kept scheduled appointments due by today?  N/A   Has home health visited the patient within 72 hours of discharge?  N/A   Pulse Ox monitoring  Intermittent   Pulse Ox device source  Patient   Psychosocial issues?  No   Did the patient receive a copy of their discharge instructions?  Yes   Nursing interventions  Reviewed instructions with patient   What is the patient's perception of their health status since discharge?  Improving   Nursing Interventions  Nurse provided patient education   Are the patient's immunizations up to date?   No   Nursing interventions  Educated on importance of maintaining up to date immunizations as advised by provider   Is the patient/caregiver able to teach back the hierarchy of who to call/visit for symptoms/problems? PCP, Specialist, Home health nurse, Urgent Care, ED, 911  Yes   Additional teach back comments  Dry cough, no soa, Felt poorly all week.  Better every day,  ambulating in yard.   Is the patient/caregiver able to  teach back signs and symptoms of worsening condition:  Fever/chills, Shortness of breath, Chest pain   Is the patient/caregiver able to teach back importance of completing antibiotic course of treatment?  Yes   Week 1 call completed?  Yes   Wrap up additional comments  Wears a mask and staying isolated.          Salome Richardson RN

## 2020-05-11 ENCOUNTER — TRANSITIONAL CARE MANAGEMENT TELEPHONE ENCOUNTER (OUTPATIENT)
Dept: CALL CENTER | Facility: HOSPITAL | Age: 60
End: 2020-05-11

## 2020-05-11 NOTE — OUTREACH NOTE
Call Center TCM Note      Responses   [facility]   COVID-19 Test Status  Negative   Does the patient have one of the following disease processes/diagnoses(primary or secondary)?  COPD/Pneumonia   Was the primary reason for admission:  Pneumonia   TCM attempt successful?  Yes   Call start time  1503   Call end time  1509   Discharge diagnosis  Acute resp failure   Meds reviewed with patient/caregiver?  Yes   Is the patient having any side effects they believe may be caused by any medication additions or changes?  No   Does the patient have all medications ordered at discharge?  Yes   Does the patient have a primary care provider?   Yes   Does the patient have an appointment with their PCP or pulmonologist within 7 days of discharge?  Yes   Has the patient kept scheduled appointments due by today?  N/A   Comments  -- [Appt with Dr. Ness on the 14th ]   What is the Home health agency?   -- [None ordered ]   Has home health visited the patient within 72 hours of discharge?  N/A   What DME was ordered?  -- [None ordered ]   Pulse Ox monitoring  None   Psychosocial issues?  No   Did the patient receive a copy of their discharge instructions?  Yes   Nursing interventions  Reviewed instructions with patient   What is the patient's perception of their health status since discharge?  Improving   Nursing Interventions  Nurse provided patient education   Are the patient's immunizations up to date?   Yes   Nursing interventions  Educated on importance of maintaining up to date immunizations as advised by provider   Is the patient/caregiver able to teach back the hierarchy of who to call/visit for symptoms/problems? PCP, Specialist, Home health nurse, Urgent Care, ED, 911  Yes   Is the patient able to teach back COPD zones?  Yes   Nursing interventions  Education provided on various zones   Patient reports what zone on this call?  Green Zone   Green Zone  Reports doing well, Sleeping  well, Appetite is good   Green Zone interventions:  Take daily medications   Is the patient/caregiver able to teach back signs and symptoms of worsening condition:  Fever/chills, Shortness of breath, Chest pain   Is the patient/caregiver able to teach back importance of completing antibiotic course of treatment?  Yes   TCM call completed?  Yes   Wrap up additional comments  -- [Pt reports he is doing very well ]          Kim Santos RN    5/11/2020, 15:09

## 2020-05-11 NOTE — OUTREACH NOTE
Call Center TCM Note      Responses   [facility] patient discharged from?  [place]   COVID-19 Test Status  Negative   Does the patient have one of the following disease processes/diagnoses(primary or secondary)?  COPD/Pneumonia   Was the primary reason for admission:  Pneumonia   TCM attempt successful?  No   Unsuccessful attempts  Attempt 1   Call Status  Voice mail issues          Kim Santos RN    5/11/2020, 09:39

## 2020-05-13 ENCOUNTER — READMISSION MANAGEMENT (OUTPATIENT)
Dept: CALL CENTER | Facility: HOSPITAL | Age: 60
End: 2020-05-13

## 2020-05-13 NOTE — OUTREACH NOTE
COPD/PN Week 1 Survey      Responses   [facility]   COVID-19 Test Status  Negative   Does the patient have one of the following disease processes/diagnoses(primary or secondary)?  COPD/Pneumonia   Is there a successful TCM telephone encounter documented?  No   Was the primary reason for admission:  Pneumonia   Week 1 attempt successful?  Yes   Call start time  1441   Call end time  1442   Discharge diagnosis  Acute resp failure   Meds reviewed with patient/caregiver?  Yes   Is the patient having any side effects they believe may be caused by any medication additions or changes?  No   Does the patient have all medications ordered at discharge?  Yes   Is the patient taking all medications as directed (includes completed medication regime)?  Yes   Does the patient have a primary care provider?   Yes   Does the patient have an appointment with their PCP or pulmonologist within 7 days of discharge?  Yes   Comments regarding PCP  PCP appt rescheduled to Friday, 5/15/2020   What is preventing the patient from scheduling follow up appointments within 7 days of discharge?  Earlier appointment not available   Has the patient kept scheduled appointments due by today?  N/A   Has home health visited the patient within 72 hours of discharge?  N/A   Pulse Ox monitoring  None   Did the patient receive a copy of their discharge instructions?  Yes   Nursing interventions  Reviewed instructions with patient   What is the patient's perception of their health status since discharge?  Improving   Nursing Interventions  Nurse provided patient education   Are the patient's immunizations up to date?   Yes   Nursing interventions  Educated on importance of maintaining up to date immunizations as advised by provider   Is the patient/caregiver able to teach back the hierarchy of who to call/visit for symptoms/problems? PCP, Specialist, Home health nurse, Urgent Care, ED, 911  Yes   Is the patient  able to teach back COPD zones?  Yes   Nursing interventions  Education provided on various zones   Patient reports what zone on this call?  Green Zone   Green Zone  Reports doing well, Sleeping well, Appetite is good, Breathing without shortness of breath, Usual activity and exercise level   Green Zone interventions:  Take daily medications   Is the patient/caregiver able to teach back signs and symptoms of worsening condition:  Fever/chills, Shortness of breath, Chest pain   Is the patient/caregiver able to teach back importance of completing antibiotic course of treatment?  Yes   Week 1 call completed?  Yes          Phil Marsh, RN

## 2020-05-14 ENCOUNTER — OFFICE VISIT (OUTPATIENT)
Dept: PULMONOLOGY | Facility: CLINIC | Age: 60
End: 2020-05-14

## 2020-05-14 VITALS
WEIGHT: 157 LBS | SYSTOLIC BLOOD PRESSURE: 115 MMHG | HEIGHT: 64 IN | HEART RATE: 74 BPM | OXYGEN SATURATION: 98 % | BODY MASS INDEX: 26.8 KG/M2 | DIASTOLIC BLOOD PRESSURE: 85 MMHG

## 2020-05-14 DIAGNOSIS — J96.20 ACUTE ON CHRONIC RESPIRATORY FAILURE, UNSPECIFIED WHETHER WITH HYPOXIA OR HYPERCAPNIA (HCC): Primary | ICD-10-CM

## 2020-05-14 DIAGNOSIS — M33.90 DERMATOMYOSITIS (HCC): ICD-10-CM

## 2020-05-14 PROCEDURE — 99204 OFFICE O/P NEW MOD 45 MIN: CPT | Performed by: INTERNAL MEDICINE
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
## 2020-05-15 ENCOUNTER — TELEMEDICINE (OUTPATIENT)
Dept: FAMILY MEDICINE CLINIC | Facility: CLINIC | Age: 60
End: 2020-05-15

## 2020-05-15 VITALS — HEART RATE: 75 BPM | OXYGEN SATURATION: 95 %

## 2020-05-15 DIAGNOSIS — Z09 HOSPITAL DISCHARGE FOLLOW-UP: Primary | ICD-10-CM

## 2020-05-15 DIAGNOSIS — J20.9 ACUTE BRONCHITIS, UNSPECIFIED ORGANISM: ICD-10-CM

## 2020-05-15 DIAGNOSIS — R07.89 CHEST TIGHTNESS: ICD-10-CM

## 2020-05-15 DIAGNOSIS — J40 BRONCHITIS: ICD-10-CM

## 2020-05-15 DIAGNOSIS — R06.2 WHEEZING: ICD-10-CM

## 2020-05-15 DIAGNOSIS — J44.9 CHRONIC OBSTRUCTIVE PULMONARY DISEASE, UNSPECIFIED COPD TYPE (HCC): ICD-10-CM

## 2020-05-15 PROCEDURE — 99214 OFFICE O/P EST MOD 30 MIN: CPT | Performed by: FAMILY MEDICINE

## 2020-05-15 RX ORDER — ALBUTEROL SULFATE 90 UG/1
2 AEROSOL, METERED RESPIRATORY (INHALATION) EVERY 6 HOURS PRN
Qty: 1 INHALER | Refills: 0 | Status: SHIPPED | OUTPATIENT
Start: 2020-05-15 | End: 2020-08-31 | Stop reason: SDUPTHER

## 2020-05-15 NOTE — PROGRESS NOTES
Subjective   Timmy Diop is a 59 y.o. male.   Cc: follow up of chronic medical issues    COPD   He complains of chest tightness, cough, difficulty breathing, shortness of breath and wheezing. There is no frequent throat clearing, hemoptysis, hoarse voice or sputum production. This is a new problem. The current episode started more than 1 year ago. The problem occurs intermittently. The cough is non-productive. Pertinent negatives include no chest pain or fever.     The patient is seen for hospital follow up. He has been out for a week. His breathing has improved. He has completed his his steroids.  The following portions of the patient's history were reviewed and updated as appropriate: allergies, current medications, past family history, past medical history, past social history, past surgical history and problem list.    Review of Systems   Constitutional: Negative for fatigue and fever.   HENT: Negative for hoarse voice.    Respiratory: Positive for cough, shortness of breath and wheezing. Negative for hemoptysis and sputum production.    Cardiovascular: Negative for chest pain, palpitations and leg swelling.       Objective   Physical Exam   Constitutional: He is oriented to person, place, and time. He appears well-developed and well-nourished.   HENT:   Head: Normocephalic and atraumatic.   Right Ear: External ear normal.   Left Ear: External ear normal.   Nose: Nose normal.   Mouth/Throat: Oropharynx is clear and moist.   Eyes: Pupils are equal, round, and reactive to light. Conjunctivae and EOM are normal.   Neck: Normal range of motion. Neck supple.   Pulmonary/Chest: Effort normal.   Abdominal: There is no tenderness.   His abdomen is unchanged he says.   Neurological: He is alert and oriented to person, place, and time.   Skin: Skin is warm.   Vitals reviewed.    Exam performed over the video.    Visit Vitals  Pulse 75   SpO2 95%     There is no height or weight on file to calculate  BMI.      Assessment/Plan   Diagnoses and all orders for this visit:    Hospital discharge follow-up    Chronic obstructive pulmonary disease, unspecified COPD type (CMS/[facility])    Bronchitis    Wheezing  -     albuterol sulfate  (90 Base) MCG/ACT inhaler; Inhale 2 puffs Every 6 (Six) Hours As Needed for Wheezing.    Chest tightness  -     albuterol sulfate  (90 Base) MCG/ACT inhaler; Inhale 2 puffs Every 6 (Six) Hours As Needed for Wheezing.    Acute bronchitis, unspecified organism  -     albuterol sulfate  (90 Base) MCG/ACT inhaler; Inhale 2 puffs Every 6 (Six) Hours As Needed for Wheezing.    Return to the clinic in 2 month/s.  Will contact with results as needed.

## 2020-05-16 ENCOUNTER — READMISSION MANAGEMENT (OUTPATIENT)
Dept: CALL CENTER | Facility: HOSPITAL | Age: 60
End: 2020-05-16

## 2020-05-16 NOTE — OUTREACH NOTE
COPD/PN Week 2 Survey      Responses   [facility] patient discharged from?  [place]   COVID-19 Test Status  Negative   Does the patient have one of the following disease processes/diagnoses(primary or secondary)?  COPD/Pneumonia   Was the primary reason for admission:  Pneumonia          Beatrice Duke RN

## 2020-05-18 ENCOUNTER — HOSPITAL ENCOUNTER (OUTPATIENT)
Dept: CT IMAGING | Facility: HOSPITAL | Age: 60
Discharge: HOME | End: 2020-05-18
Admitting: INTERNAL MEDICINE

## 2020-05-18 DIAGNOSIS — M33.90 DERMATOMYOSITIS (HCC): ICD-10-CM

## 2020-05-18 DIAGNOSIS — J96.20 ACUTE ON CHRONIC RESPIRATORY FAILURE, UNSPECIFIED WHETHER WITH HYPOXIA OR HYPERCAPNIA (HCC): ICD-10-CM

## 2020-05-18 PROCEDURE — 71250 CT THORAX DX C-: CPT

## 2020-05-19 ENCOUNTER — READMISSION MANAGEMENT (OUTPATIENT)
Dept: CALL CENTER | Facility: HOSPITAL | Age: 60
End: 2020-05-19

## 2020-05-19 NOTE — OUTREACH NOTE
COPD/PN Week 2 Survey      Responses   [facility] patient discharged from?  [place]   COVID-19 Test Status  Negative   Does the patient have one of the following disease processes/diagnoses(primary or secondary)?  COPD/Pneumonia   Was the primary reason for admission:  Pneumonia   Week 2 attempt successful?  Yes   Call start time  0857   Call end time  0901   Meds reviewed with patient/caregiver?  Yes   Is the patient having any side effects they believe may be caused by any medication additions or changes?  No   Does the patient have all medications ordered at discharge?  Yes   Is the patient taking all medications as directed (includes completed medication regime)?  Yes   Comments regarding appointments  Has another appt with Dr Holbrook for 05/20/20.   Does the patient have a primary care provider?   Yes   Does the patient have an appointment with their PCP or pulmonologist within 7 days of discharge?  Yes   Has the patient kept scheduled appointments due by today?  Yes   Comments  Kept appt with PCP on 05/15/20.   Has home health visited the patient within 72 hours of discharge?  N/A   Pulse Ox monitoring  None   Psychosocial issues?  No   Did the patient receive a copy of their discharge instructions?  Yes   What is the patient's perception of their health status since discharge?  Improving   Is the patient able to teach back COPD zones?  Yes   Patient reports what zone on this call?  Green Zone   Green Zone  Reports doing well, Usual activity and exercise level, Sleeping well, Breathing without shortness of breath, Usual amount of phlegm/mucus without difficulty coughing up, Appetite is good   Green Zone interventions:  Take daily medications, Continue regular exercise/diet plan, Avoid indoor/outdoor triggers   Is the patient/caregiver able to teach back signs and symptoms of worsening condition:  Fever/chills, Shortness of breath, Chest pain   Is the patient/caregiver able to teach back importance of  completing antibiotic course of treatment?  Yes   Week 2 call completed?  Yes   Wrap up additional comments  States is doing well-brief call. Denies any fever, cough, chest pain, or SOA. States may be released to go back to work at pulmonologist appt tomorrow. Denies any complaints.          Pam Storey RN

## 2020-05-20 ENCOUNTER — OFFICE VISIT (OUTPATIENT)
Dept: PULMONOLOGY | Facility: CLINIC | Age: 60
End: 2020-05-20

## 2020-05-20 ENCOUNTER — APPOINTMENT (OUTPATIENT)
Dept: CT IMAGING | Facility: HOSPITAL | Age: 60
End: 2020-05-20

## 2020-05-20 VITALS
WEIGHT: 159.8 LBS | HEART RATE: 96 BPM | OXYGEN SATURATION: 98 % | DIASTOLIC BLOOD PRESSURE: 78 MMHG | BODY MASS INDEX: 27.28 KG/M2 | SYSTOLIC BLOOD PRESSURE: 146 MMHG | HEIGHT: 64 IN

## 2020-05-20 DIAGNOSIS — J42 CHRONIC BRONCHITIS, UNSPECIFIED CHRONIC BRONCHITIS TYPE (HCC): Primary | ICD-10-CM

## 2020-05-20 DIAGNOSIS — J96.20 ACUTE ON CHRONIC RESPIRATORY FAILURE, UNSPECIFIED WHETHER WITH HYPOXIA OR HYPERCAPNIA (HCC): ICD-10-CM

## 2020-05-20 PROCEDURE — 99212 OFFICE O/P EST SF 10 MIN: CPT | Performed by: INTERNAL MEDICINE

## 2020-05-20 NOTE — PROGRESS NOTES
"This gentleman has chronic bronchitis no history of respiratory failure.  Breathing is improved on Brio inhaler.    ROS    Constitutional-no night sweats weight loss headaches  GI no abdominal pain nausea or diarrhea  Neuro no seizure or neurologic deficits  Musculoskeletal no deformity or joint pain   no dysuria or hematuria  Skin no rash or other lesions  All other systems reviewed and were negative except for the above.  Physical Exam  /78   Pulse 96   Ht 162.6 cm (64\")   Wt 72.5 kg (159 lb 12.8 oz)   SpO2 98%   BMI 27.43 kg/m²   Vital signs as above  Pupils equally round and reactive to light and accommodation, neck no JVD or adenopathy.  Cardiovascular regular rhythm and rate no murmur or gallop.  Abdomen soft no organomegaly tenderness.  Extremities no clubbing cyanosis or edema.  No cervical adenopathy.  No skin rash.  Neurologic good strength bilaterally without deficits  Lungs without wheeze    CT chest unrevealing    COPD chronic bronchitis clinically improved    Plan continue Brio inhaler, return in 4 months        This document has been produced with the assistance of Dragon dictation  This document has been electronically signed by Cm Holbrook MD on May 20, 2020 13:43      "

## 2020-05-22 ENCOUNTER — READMISSION MANAGEMENT (OUTPATIENT)
Dept: CALL CENTER | Facility: HOSPITAL | Age: 60
End: 2020-05-22

## 2020-05-22 NOTE — OUTREACH NOTE
COPD/PN Week 3 Survey      Responses   [facility] patient discharged from?  [place]   COVID-19 Test Status  Negative   Does the patient have one of the following disease processes/diagnoses(primary or secondary)?  COPD/Pneumonia   Was the primary reason for admission:  Pneumonia   Week 3 attempt successful?  Yes   Call start time  1406   Call end time  1409   Discharge diagnosis  Acute resp failure   Meds reviewed with patient/caregiver?  Yes   Is the patient taking all medications as directed (includes completed medication regime)?  Yes   Comments regarding appointments  Pt has been cleared by pcp.   Week 3 call completed?  Yes   Revoked  No further contact(revokes)-requires comment   Graduated/Revoked comments  Pt reports he has been cleared by pcp and will return to work this Tuesday.          Rimma Espino RN

## 2020-06-02 ENCOUNTER — PRIOR AUTHORIZATION (OUTPATIENT)
Dept: PULMONOLOGY | Facility: CLINIC | Age: 60
End: 2020-06-02

## 2020-06-02 NOTE — TELEPHONE ENCOUNTER
After the wife called 4 times today, I finally called the pharmacy back and just got the insurance information to do the PA myself.    ID - [id number]  Bin - [id number]  N - [id number]  [place]p [id number]    Sent PA through [employer]

## 2020-06-03 NOTE — TELEPHONE ENCOUNTER
Called pt's wife, Mariola and informed her that it was approved and that she could call the pharmacy and have them get it ready for him. She voiced understanding.

## 2020-08-31 ENCOUNTER — OFFICE VISIT (OUTPATIENT)
Dept: FAMILY MEDICINE CLINIC | Facility: CLINIC | Age: 60
End: 2020-08-31

## 2020-08-31 ENCOUNTER — LAB (OUTPATIENT)
Dept: LAB | Facility: HOSPITAL | Age: 60
End: 2020-08-31

## 2020-08-31 VITALS
HEIGHT: 64 IN | BODY MASS INDEX: 27.07 KG/M2 | SYSTOLIC BLOOD PRESSURE: 136 MMHG | WEIGHT: 158.56 LBS | HEART RATE: 82 BPM | OXYGEN SATURATION: 98 % | DIASTOLIC BLOOD PRESSURE: 78 MMHG

## 2020-08-31 DIAGNOSIS — J45.909 ASTHMA, UNSPECIFIED ASTHMA SEVERITY, UNSPECIFIED WHETHER COMPLICATED, UNSPECIFIED WHETHER PERSISTENT: ICD-10-CM

## 2020-08-31 DIAGNOSIS — J45.909 ASTHMA, UNSPECIFIED ASTHMA SEVERITY, UNSPECIFIED WHETHER COMPLICATED, UNSPECIFIED WHETHER PERSISTENT: Primary | ICD-10-CM

## 2020-08-31 DIAGNOSIS — R07.89 CHEST TIGHTNESS: ICD-10-CM

## 2020-08-31 PROCEDURE — 80053 COMPREHEN METABOLIC PANEL: CPT

## 2020-08-31 PROCEDURE — 36415 COLL VENOUS BLD VENIPUNCTURE: CPT

## 2020-08-31 PROCEDURE — 85025 COMPLETE CBC W/AUTO DIFF WBC: CPT

## 2020-08-31 PROCEDURE — 99214 OFFICE O/P EST MOD 30 MIN: CPT | Performed by: FAMILY MEDICINE

## 2020-08-31 RX ORDER — ALBUTEROL SULFATE 90 UG/1
2 AEROSOL, METERED RESPIRATORY (INHALATION) EVERY 6 HOURS PRN
Qty: 18 G | Refills: 8 | Status: SHIPPED | OUTPATIENT
Start: 2020-08-31 | End: 2020-11-30 | Stop reason: SDUPTHER

## 2020-08-31 RX ORDER — ALBUTEROL SULFATE 1.25 MG/3ML
1 SOLUTION RESPIRATORY (INHALATION) EVERY 6 HOURS PRN
Qty: 30 VIAL | Refills: 8 | Status: SHIPPED | OUTPATIENT
Start: 2020-08-31 | End: 2021-07-29 | Stop reason: SDUPTHER

## 2020-08-31 NOTE — PROGRESS NOTES
Subjective   Timmy Diop is a 59 y.o. male.   Cc: follow up of chronic medical issues    Asthma   He complains of chest tightness, cough, difficulty breathing, frequent throat clearing, shortness of breath and wheezing. There is no hemoptysis, hoarse voice or sputum production. Associated symptoms include dyspnea on exertion, orthopnea, PND and sneezing. Pertinent negatives include no appetite change, chest pain, fever, heartburn, malaise/fatigue, myalgias, nasal congestion, postnasal drip, rhinorrhea, sore throat, sweats or trouble swallowing. His past medical history is significant for asthma.   He gets chest tightness at times with the flare ups. His inhalers help quite a bit.    The following portions of the patient's history were reviewed and updated as appropriate: allergies, current medications, past family history, past medical history, past social history, past surgical history and problem list.    Review of Systems   Constitutional: Negative for appetite change, fever and malaise/fatigue.   HENT: Positive for sneezing. Negative for hoarse voice, postnasal drip, rhinorrhea, sore throat and trouble swallowing.    Respiratory: Positive for cough, shortness of breath and wheezing. Negative for hemoptysis and sputum production.    Cardiovascular: Positive for dyspnea on exertion and PND. Negative for chest pain.   Gastrointestinal: Negative for heartburn.   Musculoskeletal: Negative for myalgias.       Objective   Physical Exam   Constitutional: He is oriented to person, place, and time. He appears well-developed and well-nourished.   HENT:   Head: Normocephalic and atraumatic.   Right Ear: External ear normal.   Left Ear: External ear normal.   Nose: Nose normal.   Mouth/Throat: Oropharynx is clear and moist.   Eyes: Pupils are equal, round, and reactive to light.   Neck: Normal range of motion.   Cardiovascular: Normal rate, regular rhythm and normal heart sounds. Exam reveals no gallop and no friction  "rub.   No murmur heard.  Pulmonary/Chest: Effort normal and breath sounds normal. No stridor. No respiratory distress. He has no wheezes.   Abdominal: Soft. Bowel sounds are normal. He exhibits no distension and no mass. There is no tenderness. There is no guarding.   Neurological: He is alert and oriented to person, place, and time.   Skin: Skin is warm.   Vitals reviewed.        Visit Vitals  /78   Pulse 82   Ht 162.6 cm (64\")   Wt 71.9 kg (158 lb 9 oz)   SpO2 98%   BMI 27.22 kg/m²     Body mass index is 27.22 kg/m².      Assessment/Plan   Timmy was seen today for follow-up.    Diagnoses and all orders for this visit:    Asthma, unspecified asthma severity, unspecified whether complicated, unspecified whether persistent  -     Comprehensive metabolic panel; Future  -     CBC w AUTO Differential; Future    Chest tightness  -     albuterol (ACCUNEB) 1.25 MG/3ML nebulizer solution; Take 3 mL by nebulization Every 6 (Six) Hours As Needed for Wheezing.  -     albuterol sulfate  (90 Base) MCG/ACT inhaler; Inhale 2 puffs Every 6 (Six) Hours As Needed for Wheezing.    Other orders  -     umeclidinium-vilanterol (ANORO ELLIPTA) 62.5-25 MCG/INH aerosol powder  inhaler; Inhale 1 puff Daily.  -     tiotropium bromide monohydrate (Spiriva Respimat) 2.5 MCG/ACT aerosol solution inhaler; Inhale 2 puffs Daily.    Return to the clinic in 3 month/s.  Will contact with results as needed.    "

## 2020-09-01 LAB
ALBUMIN SERPL-MCNC: 4.2 G/DL (ref 3.5–5.2)
ALBUMIN/GLOB SERPL: 1.3 G/DL
ALP SERPL-CCNC: 82 U/L (ref 39–117)
ALT SERPL W P-5'-P-CCNC: 30 U/L (ref 1–41)
ANION GAP SERPL CALCULATED.3IONS-SCNC: 12.1 MMOL/L (ref 5–15)
AST SERPL-CCNC: 21 U/L (ref 1–40)
BASOPHILS # BLD AUTO: 0.11 10*3/MM3 (ref 0–0.2)
BASOPHILS NFR BLD AUTO: 1.1 % (ref 0–1.5)
BILIRUB SERPL-MCNC: 0.3 MG/DL (ref 0–1.2)
BUN SERPL-MCNC: 11 MG/DL (ref 6–20)
BUN/CREAT SERPL: 17.2 (ref 7–25)
CALCIUM SPEC-SCNC: 9.4 MG/DL (ref 8.6–10.5)
CHLORIDE SERPL-SCNC: 104 MMOL/L (ref 98–107)
CO2 SERPL-SCNC: 26.9 MMOL/L (ref 22–29)
CREAT SERPL-MCNC: 0.64 MG/DL (ref 0.76–1.27)
DEPRECATED RDW RBC AUTO: 43 FL (ref 37–54)
EOSINOPHIL # BLD AUTO: 0.59 10*3/MM3 (ref 0–0.4)
EOSINOPHIL NFR BLD AUTO: 6.1 % (ref 0.3–6.2)
ERYTHROCYTE [DISTWIDTH] IN BLOOD BY AUTOMATED COUNT: 13.1 % (ref 12.3–15.4)
GFR SERPL CREATININE-BSD FRML MDRD: 128 ML/MIN/1.73
GLOBULIN UR ELPH-MCNC: 3.3 GM/DL
GLUCOSE SERPL-MCNC: 77 MG/DL (ref 65–99)
HCT VFR BLD AUTO: 49.4 % (ref 37.5–51)
HGB BLD-MCNC: 17.2 G/DL (ref 13–17.7)
IMM GRANULOCYTES # BLD AUTO: 0.04 10*3/MM3 (ref 0–0.05)
IMM GRANULOCYTES NFR BLD AUTO: 0.4 % (ref 0–0.5)
LYMPHOCYTES # BLD AUTO: 2.02 10*3/MM3 (ref 0.7–3.1)
LYMPHOCYTES NFR BLD AUTO: 20.8 % (ref 19.6–45.3)
MCH RBC QN AUTO: 31.6 PG (ref 26.6–33)
MCHC RBC AUTO-ENTMCNC: 34.8 G/DL (ref 31.5–35.7)
MCV RBC AUTO: 90.8 FL (ref 79–97)
MONOCYTES # BLD AUTO: 0.91 10*3/MM3 (ref 0.1–0.9)
MONOCYTES NFR BLD AUTO: 9.4 % (ref 5–12)
NEUTROPHILS NFR BLD AUTO: 6.02 10*3/MM3 (ref 1.7–7)
NEUTROPHILS NFR BLD AUTO: 62.2 % (ref 42.7–76)
NRBC BLD AUTO-RTO: 0.1 /100 WBC (ref 0–0.2)
PLATELET # BLD AUTO: 325 10*3/MM3 (ref 140–450)
PMV BLD AUTO: 10.8 FL (ref 6–12)
POTASSIUM SERPL-SCNC: 4.3 MMOL/L (ref 3.5–5.2)
PROT SERPL-MCNC: 7.5 G/DL (ref 6–8.5)
RBC # BLD AUTO: 5.44 10*6/MM3 (ref 4.14–5.8)
SODIUM SERPL-SCNC: 143 MMOL/L (ref 136–145)
WBC # BLD AUTO: 9.69 10*3/MM3 (ref 3.4–10.8)

## 2020-11-30 ENCOUNTER — LAB (OUTPATIENT)
Dept: LAB | Facility: HOSPITAL | Age: 60
End: 2020-11-30

## 2020-11-30 ENCOUNTER — OFFICE VISIT (OUTPATIENT)
Dept: FAMILY MEDICINE CLINIC | Facility: CLINIC | Age: 60
End: 2020-11-30

## 2020-11-30 VITALS
OXYGEN SATURATION: 98 % | BODY MASS INDEX: 26.98 KG/M2 | HEIGHT: 64 IN | WEIGHT: 158 LBS | SYSTOLIC BLOOD PRESSURE: 138 MMHG | HEART RATE: 81 BPM | DIASTOLIC BLOOD PRESSURE: 72 MMHG

## 2020-11-30 DIAGNOSIS — R07.89 CHEST TIGHTNESS: ICD-10-CM

## 2020-11-30 DIAGNOSIS — J42 CHRONIC BRONCHITIS, UNSPECIFIED CHRONIC BRONCHITIS TYPE (HCC): ICD-10-CM

## 2020-11-30 DIAGNOSIS — J42 CHRONIC BRONCHITIS, UNSPECIFIED CHRONIC BRONCHITIS TYPE (HCC): Primary | ICD-10-CM

## 2020-11-30 DIAGNOSIS — J40 BRONCHITIS: ICD-10-CM

## 2020-11-30 LAB
ALBUMIN SERPL-MCNC: 4.4 G/DL (ref 3.5–5.2)
ALBUMIN/GLOB SERPL: 1.4 G/DL
ALP SERPL-CCNC: 86 U/L (ref 39–117)
ALT SERPL W P-5'-P-CCNC: 26 U/L (ref 1–41)
ANION GAP SERPL CALCULATED.3IONS-SCNC: 7.1 MMOL/L (ref 5–15)
AST SERPL-CCNC: 21 U/L (ref 1–40)
BASOPHILS # BLD AUTO: 0.09 10*3/MM3 (ref 0–0.2)
BASOPHILS NFR BLD AUTO: 1.2 % (ref 0–1.5)
BILIRUB SERPL-MCNC: 0.6 MG/DL (ref 0–1.2)
BUN SERPL-MCNC: 9 MG/DL (ref 6–20)
BUN/CREAT SERPL: 15.3 (ref 7–25)
CALCIUM SPEC-SCNC: 9.3 MG/DL (ref 8.6–10.5)
CHLORIDE SERPL-SCNC: 103 MMOL/L (ref 98–107)
CO2 SERPL-SCNC: 31.9 MMOL/L (ref 22–29)
CREAT SERPL-MCNC: 0.59 MG/DL (ref 0.76–1.27)
DEPRECATED RDW RBC AUTO: 38.9 FL (ref 37–54)
EOSINOPHIL # BLD AUTO: 0.4 10*3/MM3 (ref 0–0.4)
EOSINOPHIL NFR BLD AUTO: 5.4 % (ref 0.3–6.2)
ERYTHROCYTE [DISTWIDTH] IN BLOOD BY AUTOMATED COUNT: 12.3 % (ref 12.3–15.4)
GFR SERPL CREATININE-BSD FRML MDRD: 141 ML/MIN/1.73
GLOBULIN UR ELPH-MCNC: 3.1 GM/DL
GLUCOSE SERPL-MCNC: 95 MG/DL (ref 65–99)
HCT VFR BLD AUTO: 50 % (ref 37.5–51)
HGB BLD-MCNC: 17.2 G/DL (ref 13–17.7)
IMM GRANULOCYTES # BLD AUTO: 0.03 10*3/MM3 (ref 0–0.05)
IMM GRANULOCYTES NFR BLD AUTO: 0.4 % (ref 0–0.5)
LYMPHOCYTES # BLD AUTO: 1.24 10*3/MM3 (ref 0.7–3.1)
LYMPHOCYTES NFR BLD AUTO: 16.8 % (ref 19.6–45.3)
MCH RBC QN AUTO: 30.2 PG (ref 26.6–33)
MCHC RBC AUTO-ENTMCNC: 34.4 G/DL (ref 31.5–35.7)
MCV RBC AUTO: 87.9 FL (ref 79–97)
MONOCYTES # BLD AUTO: 0.67 10*3/MM3 (ref 0.1–0.9)
MONOCYTES NFR BLD AUTO: 9.1 % (ref 5–12)
NEUTROPHILS NFR BLD AUTO: 4.95 10*3/MM3 (ref 1.7–7)
NEUTROPHILS NFR BLD AUTO: 67.1 % (ref 42.7–76)
NRBC BLD AUTO-RTO: 0 /100 WBC (ref 0–0.2)
PLATELET # BLD AUTO: 354 10*3/MM3 (ref 140–450)
PMV BLD AUTO: 10.6 FL (ref 6–12)
POTASSIUM SERPL-SCNC: 4.5 MMOL/L (ref 3.5–5.2)
PROT SERPL-MCNC: 7.5 G/DL (ref 6–8.5)
RBC # BLD AUTO: 5.69 10*6/MM3 (ref 4.14–5.8)
SODIUM SERPL-SCNC: 142 MMOL/L (ref 136–145)
WBC # BLD AUTO: 7.38 10*3/MM3 (ref 3.4–10.8)

## 2020-11-30 PROCEDURE — 36415 COLL VENOUS BLD VENIPUNCTURE: CPT

## 2020-11-30 PROCEDURE — 80053 COMPREHEN METABOLIC PANEL: CPT

## 2020-11-30 PROCEDURE — 85025 COMPLETE CBC W/AUTO DIFF WBC: CPT

## 2020-11-30 PROCEDURE — 99214 OFFICE O/P EST MOD 30 MIN: CPT | Performed by: FAMILY MEDICINE

## 2020-11-30 RX ORDER — ALBUTEROL SULFATE 90 UG/1
2 AEROSOL, METERED RESPIRATORY (INHALATION) EVERY 6 HOURS PRN
Qty: 18 G | Refills: 8 | Status: SHIPPED | OUTPATIENT
Start: 2020-11-30 | End: 2021-03-29 | Stop reason: SDUPTHER

## 2020-11-30 RX ORDER — TIOTROPIUM BROMIDE INHALATION SPRAY 3.12 UG/1
2 SPRAY, METERED RESPIRATORY (INHALATION)
Qty: 4 G | Refills: 8 | Status: SHIPPED | OUTPATIENT
Start: 2020-11-30 | End: 2021-03-29 | Stop reason: SDUPTHER

## 2020-11-30 RX ORDER — AZITHROMYCIN 250 MG/1
TABLET, FILM COATED ORAL
Qty: 6 TABLET | Refills: 0 | Status: SHIPPED | OUTPATIENT
Start: 2020-11-30 | End: 2021-03-29

## 2020-11-30 RX ORDER — PREDNISONE 10 MG/1
TABLET ORAL
Qty: 30 TABLET | Refills: 0 | Status: SHIPPED | OUTPATIENT
Start: 2020-11-30 | End: 2021-03-29

## 2020-11-30 NOTE — PROGRESS NOTES
Subjective   Timmy Diop is a 59 y.o. male.   Cc: follow up of chronic medical issues    COPD  He complains of chest tightness, cough, difficulty breathing, shortness of breath and wheezing. There is no frequent throat clearing, hemoptysis, hoarse voice or sputum production. Pertinent negatives include no chest pain or fever.     He is having an increased difficulty in breathing. Lying down worsens it.This particular episode started two days.  The following portions of the patient's history were reviewed and updated as appropriate: allergies, current medications, past family history, past medical history, past social history, past surgical history and problem list.    Review of Systems   Constitutional: Negative for fatigue and fever.   HENT: Negative for hoarse voice.    Respiratory: Positive for cough, shortness of breath and wheezing. Negative for hemoptysis, sputum production and choking.    Cardiovascular: Negative for chest pain and leg swelling.       Objective   Physical Exam  Vitals signs reviewed.   Constitutional:       Appearance: Normal appearance.   HENT:      Head: Normocephalic and atraumatic.      Right Ear: Tympanic membrane, ear canal and external ear normal.      Left Ear: Tympanic membrane, ear canal and external ear normal.      Nose: Nose normal.      Mouth/Throat:      Mouth: Mucous membranes are moist.      Pharynx: Oropharynx is clear.   Eyes:      Pupils: Pupils are equal, round, and reactive to light.   Neck:      Musculoskeletal: Normal range of motion and neck supple.   Cardiovascular:      Rate and Rhythm: Normal rate and regular rhythm.      Heart sounds: Normal heart sounds. No murmur. No friction rub. No gallop.    Pulmonary:      Effort: Pulmonary effort is normal. No respiratory distress.      Breath sounds: Normal breath sounds. No stridor. No wheezing, rhonchi or rales.   Abdominal:      General: Abdomen is flat. Bowel sounds are normal. There is no distension.       "Palpations: Abdomen is soft.      Tenderness: There is no abdominal tenderness. There is no guarding.   Musculoskeletal: Normal range of motion.         General: No swelling.   Skin:     General: Skin is warm and dry.   Neurological:      Mental Status: He is alert.           Visit Vitals  /72   Pulse 81   Ht 162.6 cm (64\")   Wt 71.7 kg (158 lb)   SpO2 98%   BMI 27.12 kg/m²     Body mass index is 27.12 kg/m².      Assessment/Plan   Diagnoses and all orders for this visit:    1. Chronic bronchitis, unspecified chronic bronchitis type (CMS/HCC) (Primary)  -     Comprehensive metabolic panel; Future  -     CBC w AUTO Differential; Future    2. Chest tightness  -     albuterol sulfate  (90 Base) MCG/ACT inhaler; Inhale 2 puffs Every 6 (Six) Hours As Needed for Wheezing.  Dispense: 18 g; Refill: 8    3. Bronchitis    Other orders  -     tiotropium bromide monohydrate (Spiriva Respimat) 2.5 MCG/ACT aerosol solution inhaler; Inhale 2 puffs Daily.  Dispense: 4 g; Refill: 8  -     umeclidinium-vilanterol (ANORO ELLIPTA) 62.5-25 MCG/INH aerosol powder  inhaler; Inhale 1 puff Daily.  Dispense: 1 each; Refill: 5  -     azithromycin (Zithromax Z-Jose Angel) 250 MG tablet; Take 2 tablets by mouth on day 1, then 1 tablet daily on days 2-5  Dispense: 6 tablet; Refill: 0  -     predniSONE (DELTASONE) 10 MG tablet; .4 daily times three days, 3 daily times three days, 2 daily times three days,1 daily times three days  Dispense: 30 tablet; Refill: 0    Declined a flu shot.  Return to the clinic in 4 month/s.  Will contact with results as needed.    "

## 2021-03-29 ENCOUNTER — LAB (OUTPATIENT)
Dept: LAB | Facility: HOSPITAL | Age: 61
End: 2021-03-29

## 2021-03-29 ENCOUNTER — OFFICE VISIT (OUTPATIENT)
Dept: FAMILY MEDICINE CLINIC | Facility: CLINIC | Age: 61
End: 2021-03-29

## 2021-03-29 VITALS
HEIGHT: 64 IN | WEIGHT: 162.5 LBS | BODY MASS INDEX: 27.74 KG/M2 | DIASTOLIC BLOOD PRESSURE: 80 MMHG | HEART RATE: 80 BPM | OXYGEN SATURATION: 96 % | SYSTOLIC BLOOD PRESSURE: 142 MMHG

## 2021-03-29 DIAGNOSIS — R07.89 CHEST TIGHTNESS: ICD-10-CM

## 2021-03-29 DIAGNOSIS — J42 CHRONIC BRONCHITIS, UNSPECIFIED CHRONIC BRONCHITIS TYPE (HCC): ICD-10-CM

## 2021-03-29 DIAGNOSIS — J42 CHRONIC BRONCHITIS, UNSPECIFIED CHRONIC BRONCHITIS TYPE (HCC): Primary | ICD-10-CM

## 2021-03-29 LAB
ALBUMIN SERPL-MCNC: 4 G/DL (ref 3.5–5.2)
ALBUMIN/GLOB SERPL: 1.4 G/DL
ALP SERPL-CCNC: 79 U/L (ref 39–117)
ALT SERPL W P-5'-P-CCNC: 28 U/L (ref 1–41)
ANION GAP SERPL CALCULATED.3IONS-SCNC: 6.1 MMOL/L (ref 5–15)
AST SERPL-CCNC: 23 U/L (ref 1–40)
BASOPHILS # BLD AUTO: 0.08 10*3/MM3 (ref 0–0.2)
BASOPHILS NFR BLD AUTO: 1.2 % (ref 0–1.5)
BILIRUB SERPL-MCNC: 0.4 MG/DL (ref 0–1.2)
BUN SERPL-MCNC: 10 MG/DL (ref 6–20)
BUN/CREAT SERPL: 16.1 (ref 7–25)
CALCIUM SPEC-SCNC: 9 MG/DL (ref 8.6–10.5)
CHLORIDE SERPL-SCNC: 101 MMOL/L (ref 98–107)
CHOLEST SERPL-MCNC: 179 MG/DL (ref 0–200)
CO2 SERPL-SCNC: 31.9 MMOL/L (ref 22–29)
CREAT SERPL-MCNC: 0.62 MG/DL (ref 0.76–1.27)
DEPRECATED RDW RBC AUTO: 40 FL (ref 37–54)
EOSINOPHIL # BLD AUTO: 0.33 10*3/MM3 (ref 0–0.4)
EOSINOPHIL NFR BLD AUTO: 4.9 % (ref 0.3–6.2)
ERYTHROCYTE [DISTWIDTH] IN BLOOD BY AUTOMATED COUNT: 12.3 % (ref 12.3–15.4)
GFR SERPL CREATININE-BSD FRML MDRD: 133 ML/MIN/1.73
GLOBULIN UR ELPH-MCNC: 2.9 GM/DL
GLUCOSE SERPL-MCNC: 109 MG/DL (ref 65–99)
HCT VFR BLD AUTO: 49.2 % (ref 37.5–51)
HDLC SERPL-MCNC: 38 MG/DL (ref 40–60)
HGB BLD-MCNC: 17.2 G/DL (ref 13–17.7)
IMM GRANULOCYTES # BLD AUTO: 0.03 10*3/MM3 (ref 0–0.05)
IMM GRANULOCYTES NFR BLD AUTO: 0.4 % (ref 0–0.5)
LDLC SERPL CALC-MCNC: 119 MG/DL (ref 0–100)
LDLC/HDLC SERPL: 3.07 {RATIO}
LYMPHOCYTES # BLD AUTO: 1.27 10*3/MM3 (ref 0.7–3.1)
LYMPHOCYTES NFR BLD AUTO: 18.8 % (ref 19.6–45.3)
MCH RBC QN AUTO: 31.4 PG (ref 26.6–33)
MCHC RBC AUTO-ENTMCNC: 35 G/DL (ref 31.5–35.7)
MCV RBC AUTO: 89.8 FL (ref 79–97)
MONOCYTES # BLD AUTO: 0.74 10*3/MM3 (ref 0.1–0.9)
MONOCYTES NFR BLD AUTO: 10.9 % (ref 5–12)
NEUTROPHILS NFR BLD AUTO: 4.32 10*3/MM3 (ref 1.7–7)
NEUTROPHILS NFR BLD AUTO: 63.8 % (ref 42.7–76)
NRBC BLD AUTO-RTO: 0 /100 WBC (ref 0–0.2)
PLATELET # BLD AUTO: 315 10*3/MM3 (ref 140–450)
PMV BLD AUTO: 10.5 FL (ref 6–12)
POTASSIUM SERPL-SCNC: 4.6 MMOL/L (ref 3.5–5.2)
PROT SERPL-MCNC: 6.9 G/DL (ref 6–8.5)
RBC # BLD AUTO: 5.48 10*6/MM3 (ref 4.14–5.8)
SODIUM SERPL-SCNC: 139 MMOL/L (ref 136–145)
TRIGL SERPL-MCNC: 122 MG/DL (ref 0–150)
VLDLC SERPL-MCNC: 22 MG/DL (ref 5–40)
WBC # BLD AUTO: 6.77 10*3/MM3 (ref 3.4–10.8)

## 2021-03-29 PROCEDURE — 99214 OFFICE O/P EST MOD 30 MIN: CPT | Performed by: FAMILY MEDICINE

## 2021-03-29 PROCEDURE — 36415 COLL VENOUS BLD VENIPUNCTURE: CPT

## 2021-03-29 PROCEDURE — 80053 COMPREHEN METABOLIC PANEL: CPT

## 2021-03-29 PROCEDURE — 85025 COMPLETE CBC W/AUTO DIFF WBC: CPT

## 2021-03-29 PROCEDURE — 80061 LIPID PANEL: CPT

## 2021-03-29 RX ORDER — TIOTROPIUM BROMIDE INHALATION SPRAY 3.12 UG/1
2 SPRAY, METERED RESPIRATORY (INHALATION)
Qty: 4 G | Refills: 8 | Status: SHIPPED | OUTPATIENT
Start: 2021-03-29 | End: 2021-09-09 | Stop reason: SDUPTHER

## 2021-03-29 RX ORDER — ALBUTEROL SULFATE 90 UG/1
2 AEROSOL, METERED RESPIRATORY (INHALATION) EVERY 6 HOURS PRN
Qty: 18 G | Refills: 8 | Status: SHIPPED | OUTPATIENT
Start: 2021-03-29 | End: 2021-07-29 | Stop reason: SDUPTHER

## 2021-03-29 NOTE — PROGRESS NOTES
Subjective   Timmy Diop is a 60 y.o. male.   Cc: follow up of chronic medical issues    COPD  He complains of chest tightness, cough, difficulty breathing, frequent throat clearing, shortness of breath and wheezing. There is no hemoptysis, hoarse voice or sputum production. The cough is non-productive. Associated symptoms include chest pain, dyspnea on exertion, headaches, malaise/fatigue, orthopnea, PND, sneezing and a sore throat. Pertinent negatives include no appetite change, ear congestion, ear pain, fever, heartburn, myalgias, nasal congestion, postnasal drip, rhinorrhea, sweats, trouble swallowing or weight loss.   He says that he gets tightness in the chest when it flares up. With the change of weather, he feels as if it is worsening.    The following portions of the patient's history were reviewed and updated as appropriate: allergies, current medications, past family history, past medical history, past social history, past surgical history and problem list.    Review of Systems   Constitutional: Positive for malaise/fatigue. Negative for appetite change, fever and weight loss.   HENT: Positive for sneezing and sore throat. Negative for ear pain, hoarse voice, postnasal drip, rhinorrhea and trouble swallowing.    Respiratory: Positive for cough, shortness of breath and wheezing. Negative for hemoptysis and sputum production.    Cardiovascular: Positive for chest pain, dyspnea on exertion and PND.   Gastrointestinal: Negative for heartburn.   Musculoskeletal: Negative for myalgias.   Neurological: Positive for headaches.       Objective   Physical Exam  Vitals reviewed.   Constitutional:       Appearance: Normal appearance.   HENT:      Head: Normocephalic and atraumatic.      Right Ear: Tympanic membrane, ear canal and external ear normal.      Left Ear: Tympanic membrane, ear canal and external ear normal.      Nose: Nose normal.      Mouth/Throat:      Mouth: Mucous membranes are moist.       "Pharynx: Oropharynx is clear.   Eyes:      Extraocular Movements: Extraocular movements intact.      Pupils: Pupils are equal, round, and reactive to light.   Cardiovascular:      Rate and Rhythm: Normal rate and regular rhythm.      Heart sounds: Normal heart sounds. No friction rub. No gallop.    Pulmonary:      Effort: Pulmonary effort is normal. No respiratory distress.      Breath sounds: Normal breath sounds. No stridor. No wheezing, rhonchi or rales.   Abdominal:      General: Abdomen is flat. Bowel sounds are normal. There is no distension.      Palpations: Abdomen is soft. There is no mass.      Tenderness: There is no abdominal tenderness. There is no guarding.      Hernia: No hernia is present.   Musculoskeletal:      Cervical back: Normal range of motion and neck supple.   Skin:     General: Skin is warm and dry.   Neurological:      Mental Status: He is alert.           Visit Vitals  /80   Pulse 80   Ht 162.6 cm (64\")   Wt 73.7 kg (162 lb 8 oz)   SpO2 96%   BMI 27.89 kg/m²     Body mass index is 27.89 kg/m².      Assessment/Plan   Diagnoses and all orders for this visit:    1. Chronic bronchitis, unspecified chronic bronchitis type (CMS/HCC) (Primary)  -     Comprehensive metabolic panel; Future  -     CBC w AUTO Differential; Future  -     Lipid panel; Future    2. Chest tightness  -     albuterol sulfate  (90 Base) MCG/ACT inhaler; Inhale 2 puffs Every 6 (Six) Hours As Needed for Wheezing.  Dispense: 18 g; Refill: 8    Other orders  -     tiotropium bromide monohydrate (Spiriva Respimat) 2.5 MCG/ACT aerosol solution inhaler; Inhale 2 puffs Daily.  Dispense: 4 g; Refill: 8  -     umeclidinium-vilanterol (ANORO ELLIPTA) 62.5-25 MCG/INH aerosol powder  inhaler; Inhale 1 puff Daily.  Dispense: 1 each; Refill: 5    Return to the clinic in 3 month/s.  Will contact with results as needed.    "

## 2021-03-30 ENCOUNTER — TELEPHONE (OUTPATIENT)
Dept: FAMILY MEDICINE CLINIC | Facility: CLINIC | Age: 61
End: 2021-03-30

## 2021-03-30 NOTE — TELEPHONE ENCOUNTER
Call Mr. Diop that I have some samples of Spiriva. I am hard put to come up with anything else. Do any of the companies have any assistance plans?

## 2021-03-30 NOTE — TELEPHONE ENCOUNTER
Pt called and said that the Spiriva inhaler was going to cost him to much as well as the anoro he wanted to know if there was something else that could be called in

## 2021-04-06 ENCOUNTER — TELEPHONE (OUTPATIENT)
Dept: FAMILY MEDICINE CLINIC | Facility: CLINIC | Age: 61
End: 2021-04-06

## 2021-04-19 ENCOUNTER — APPOINTMENT (OUTPATIENT)
Dept: GENERAL RADIOLOGY | Facility: HOSPITAL | Age: 61
End: 2021-04-19

## 2021-04-19 ENCOUNTER — HOSPITAL ENCOUNTER (INPATIENT)
Facility: HOSPITAL | Age: 61
LOS: 1 days | Discharge: HOME | End: 2021-04-20
Attending: EMERGENCY MEDICINE | Admitting: FAMILY MEDICINE

## 2021-04-19 DIAGNOSIS — J96.01 ACUTE RESPIRATORY FAILURE WITH HYPOXIA AND HYPERCAPNIA (HCC): ICD-10-CM

## 2021-04-19 DIAGNOSIS — J96.02 ACUTE RESPIRATORY FAILURE WITH HYPOXIA AND HYPERCAPNIA (HCC): ICD-10-CM

## 2021-04-19 DIAGNOSIS — J44.1 COPD EXACERBATION (HCC): Primary | ICD-10-CM

## 2021-04-19 PROBLEM — R79.89 POSITIVE D DIMER: Status: ACTIVE | Noted: 2021-04-19

## 2021-04-19 LAB
ALBUMIN SERPL-MCNC: 4.3 G/DL (ref 3.5–5.2)
ALBUMIN/GLOB SERPL: 1.3 G/DL
ALP SERPL-CCNC: 98 U/L (ref 39–117)
ALT SERPL W P-5'-P-CCNC: 28 U/L (ref 1–41)
ANION GAP SERPL CALCULATED.3IONS-SCNC: 7 MMOL/L (ref 5–15)
ARTERIAL PATENCY WRIST A: POSITIVE
ARTERIAL PATENCY WRIST A: POSITIVE
AST SERPL-CCNC: 24 U/L (ref 1–40)
ATMOSPHERIC PRESS: 746 MMHG
ATMOSPHERIC PRESS: 747 MMHG
B PARAPERT DNA SPEC QL NAA+PROBE: NOT DETECTED
B PERT DNA SPEC QL NAA+PROBE: NOT DETECTED
BASE EXCESS BLDA CALC-SCNC: 0.6 MMOL/L (ref 0–2)
BASE EXCESS BLDA CALC-SCNC: 4.3 MMOL/L (ref 0–2)
BASOPHILS # BLD AUTO: 0.04 10*3/MM3 (ref 0–0.2)
BASOPHILS # BLD AUTO: 0.1 10*3/MM3 (ref 0–0.2)
BASOPHILS NFR BLD AUTO: 0.4 % (ref 0–1.5)
BASOPHILS NFR BLD AUTO: 0.9 % (ref 0–1.5)
BDY SITE: ABNORMAL
BDY SITE: ABNORMAL
BILIRUB SERPL-MCNC: 0.3 MG/DL (ref 0–1.2)
BUN SERPL-MCNC: 10 MG/DL (ref 6–20)
BUN/CREAT SERPL: 12.8 (ref 7–25)
C PNEUM DNA NPH QL NAA+NON-PROBE: NOT DETECTED
CALCIUM SPEC-SCNC: 9.3 MG/DL (ref 8.6–10.5)
CHLORIDE SERPL-SCNC: 101 MMOL/L (ref 98–107)
CO2 SERPL-SCNC: 33 MMOL/L (ref 22–29)
CREAT SERPL-MCNC: 0.78 MG/DL (ref 0.76–1.27)
D-DIMER, QUANTITATIVE (MAD,POW, STR): 485 NG/ML (FEU) (ref 0–470)
D-LACTATE SERPL-SCNC: 1 MMOL/L (ref 0.5–2)
D-LACTATE SERPL-SCNC: 3.7 MMOL/L (ref 0.5–2)
DEPRECATED RDW RBC AUTO: 44.8 FL (ref 37–54)
DEPRECATED RDW RBC AUTO: 47 FL (ref 37–54)
EOSINOPHIL # BLD AUTO: 0.02 10*3/MM3 (ref 0–0.4)
EOSINOPHIL # BLD AUTO: 0.65 10*3/MM3 (ref 0–0.4)
EOSINOPHIL NFR BLD AUTO: 0.2 % (ref 0.3–6.2)
EOSINOPHIL NFR BLD AUTO: 6.2 % (ref 0.3–6.2)
ERYTHROCYTE [DISTWIDTH] IN BLOOD BY AUTOMATED COUNT: 13.2 % (ref 12.3–15.4)
ERYTHROCYTE [DISTWIDTH] IN BLOOD BY AUTOMATED COUNT: 13.4 % (ref 12.3–15.4)
FLUAV RNA RESP QL NAA+PROBE: NOT DETECTED
FLUAV SUBTYP SPEC NAA+PROBE: NOT DETECTED
FLUBV RNA ISLT QL NAA+PROBE: NOT DETECTED
FLUBV RNA RESP QL NAA+PROBE: NOT DETECTED
GAS FLOW AIRWAY: 15 LPM
GAS FLOW AIRWAY: 5 LPM
GFR SERPL CREATININE-BSD FRML MDRD: 102 ML/MIN/1.73
GLOBULIN UR ELPH-MCNC: 3.3 GM/DL
GLUCOSE SERPL-MCNC: 145 MG/DL (ref 65–99)
HADV DNA SPEC NAA+PROBE: NOT DETECTED
HCO3 BLDA-SCNC: 29.9 MMOL/L (ref 20–26)
HCO3 BLDA-SCNC: 30.8 MMOL/L (ref 20–26)
HCOV 229E RNA SPEC QL NAA+PROBE: NOT DETECTED
HCOV HKU1 RNA SPEC QL NAA+PROBE: NOT DETECTED
HCOV NL63 RNA SPEC QL NAA+PROBE: NOT DETECTED
HCOV OC43 RNA SPEC QL NAA+PROBE: NOT DETECTED
HCT VFR BLD AUTO: 48.2 % (ref 37.5–51)
HCT VFR BLD AUTO: 53.9 % (ref 37.5–51)
HGB BLD-MCNC: 16.2 G/DL (ref 13–17.7)
HGB BLD-MCNC: 17.5 G/DL (ref 13–17.7)
HMPV RNA NPH QL NAA+NON-PROBE: NOT DETECTED
HOLD SPECIMEN: NORMAL
HOLD SPECIMEN: NORMAL
HPIV1 RNA SPEC QL NAA+PROBE: NOT DETECTED
HPIV2 RNA SPEC QL NAA+PROBE: NOT DETECTED
HPIV3 RNA NPH QL NAA+PROBE: NOT DETECTED
HPIV4 P GENE NPH QL NAA+PROBE: NOT DETECTED
IMM GRANULOCYTES # BLD AUTO: 0.04 10*3/MM3 (ref 0–0.05)
IMM GRANULOCYTES # BLD AUTO: 0.11 10*3/MM3 (ref 0–0.05)
IMM GRANULOCYTES NFR BLD AUTO: 0.4 % (ref 0–0.5)
IMM GRANULOCYTES NFR BLD AUTO: 1 % (ref 0–0.5)
INHALED O2 CONCENTRATION: 100 %
LYMPHOCYTES # BLD AUTO: 0.46 10*3/MM3 (ref 0.7–3.1)
LYMPHOCYTES # BLD AUTO: 3.33 10*3/MM3 (ref 0.7–3.1)
LYMPHOCYTES NFR BLD AUTO: 31.6 % (ref 19.6–45.3)
LYMPHOCYTES NFR BLD AUTO: 4.4 % (ref 19.6–45.3)
Lab: ABNORMAL
Lab: ABNORMAL
M PNEUMO IGG SER IA-ACNC: NOT DETECTED
MAGNESIUM SERPL-MCNC: 2.2 MG/DL (ref 1.6–2.6)
MCH RBC QN AUTO: 30.8 PG (ref 26.6–33)
MCH RBC QN AUTO: 30.9 PG (ref 26.6–33)
MCHC RBC AUTO-ENTMCNC: 32.5 G/DL (ref 31.5–35.7)
MCHC RBC AUTO-ENTMCNC: 33.6 G/DL (ref 31.5–35.7)
MCV RBC AUTO: 92 FL (ref 79–97)
MCV RBC AUTO: 94.7 FL (ref 79–97)
MODALITY: ABNORMAL
MODALITY: ABNORMAL
MONOCYTES # BLD AUTO: 0.12 10*3/MM3 (ref 0.1–0.9)
MONOCYTES # BLD AUTO: 1.14 10*3/MM3 (ref 0.1–0.9)
MONOCYTES NFR BLD AUTO: 1.1 % (ref 5–12)
MONOCYTES NFR BLD AUTO: 10.8 % (ref 5–12)
NEUTROPHILS NFR BLD AUTO: 49.5 % (ref 42.7–76)
NEUTROPHILS NFR BLD AUTO: 5.21 10*3/MM3 (ref 1.7–7)
NEUTROPHILS NFR BLD AUTO: 9.85 10*3/MM3 (ref 1.7–7)
NEUTROPHILS NFR BLD AUTO: 93.5 % (ref 42.7–76)
NRBC BLD AUTO-RTO: 0 /100 WBC (ref 0–0.2)
NRBC BLD AUTO-RTO: 0 /100 WBC (ref 0–0.2)
NT-PROBNP SERPL-MCNC: 34.9 PG/ML (ref 0–900)
PCO2 BLDA: 50.5 MM HG (ref 35–45)
PCO2 BLDA: 65.4 MM HG (ref 35–45)
PH BLDA: 7.27 PH UNITS (ref 7.35–7.45)
PH BLDA: 7.39 PH UNITS (ref 7.35–7.45)
PLATELET # BLD AUTO: 253 10*3/MM3 (ref 140–450)
PLATELET # BLD AUTO: 349 10*3/MM3 (ref 140–450)
PMV BLD AUTO: 10 FL (ref 6–12)
PMV BLD AUTO: 9.7 FL (ref 6–12)
PO2 BLDA: 275 MM HG (ref 83–108)
PO2 BLDA: 84.3 MM HG (ref 83–108)
POTASSIUM SERPL-SCNC: 4.2 MMOL/L (ref 3.5–5.2)
PROT SERPL-MCNC: 7.6 G/DL (ref 6–8.5)
RBC # BLD AUTO: 5.24 10*6/MM3 (ref 4.14–5.8)
RBC # BLD AUTO: 5.69 10*6/MM3 (ref 4.14–5.8)
RHINOVIRUS RNA SPEC NAA+PROBE: NOT DETECTED
RSV RNA NPH QL NAA+NON-PROBE: NOT DETECTED
S PNEUM AG SPEC QL LA: NEGATIVE
SAO2 % BLDCOA: 95 % (ref 94–99)
SAO2 % BLDCOA: >100 % (ref 94–99)
SARS-COV-2 RNA RESP QL NAA+PROBE: NOT DETECTED
SODIUM SERPL-SCNC: 141 MMOL/L (ref 136–145)
TROPONIN T SERPL-MCNC: <0.01 NG/ML (ref 0–0.03)
VENTILATOR MODE: ABNORMAL
VENTILATOR MODE: ABNORMAL
WBC # BLD AUTO: 10.53 10*3/MM3 (ref 3.4–10.8)
WBC # BLD AUTO: 10.54 10*3/MM3 (ref 3.4–10.8)

## 2021-04-19 PROCEDURE — 93005 ELECTROCARDIOGRAM TRACING: CPT | Performed by: EMERGENCY MEDICINE

## 2021-04-19 PROCEDURE — 94660 CPAP INITIATION&MGMT: CPT

## 2021-04-19 PROCEDURE — 85379 FIBRIN DEGRADATION QUANT: CPT | Performed by: EMERGENCY MEDICINE

## 2021-04-19 PROCEDURE — 94760 N-INVAS EAR/PLS OXIMETRY 1: CPT

## 2021-04-19 PROCEDURE — 94799 UNLISTED PULMONARY SVC/PX: CPT

## 2021-04-19 PROCEDURE — 80053 COMPREHEN METABOLIC PANEL: CPT | Performed by: EMERGENCY MEDICINE

## 2021-04-19 PROCEDURE — 83605 ASSAY OF LACTIC ACID: CPT | Performed by: EMERGENCY MEDICINE

## 2021-04-19 PROCEDURE — 87070 CULTURE OTHR SPECIMN AEROBIC: CPT | Performed by: STUDENT IN AN ORGANIZED HEALTH CARE EDUCATION/TRAINING PROGRAM

## 2021-04-19 PROCEDURE — 25010000002 METHYLPREDNISOLONE PER 125 MG: Performed by: EMERGENCY MEDICINE

## 2021-04-19 PROCEDURE — 83735 ASSAY OF MAGNESIUM: CPT | Performed by: EMERGENCY MEDICINE

## 2021-04-19 PROCEDURE — 25010000002 ENOXAPARIN PER 10 MG: Performed by: STUDENT IN AN ORGANIZED HEALTH CARE EDUCATION/TRAINING PROGRAM

## 2021-04-19 PROCEDURE — 36415 COLL VENOUS BLD VENIPUNCTURE: CPT | Performed by: EMERGENCY MEDICINE

## 2021-04-19 PROCEDURE — 83880 ASSAY OF NATRIURETIC PEPTIDE: CPT | Performed by: EMERGENCY MEDICINE

## 2021-04-19 PROCEDURE — 85025 COMPLETE CBC W/AUTO DIFF WBC: CPT | Performed by: STUDENT IN AN ORGANIZED HEALTH CARE EDUCATION/TRAINING PROGRAM

## 2021-04-19 PROCEDURE — 82803 BLOOD GASES ANY COMBINATION: CPT

## 2021-04-19 PROCEDURE — 0100U HC BIOFIRE FILMARRAY RESP PANEL 2: CPT | Performed by: STUDENT IN AN ORGANIZED HEALTH CARE EDUCATION/TRAINING PROGRAM

## 2021-04-19 PROCEDURE — 87205 SMEAR GRAM STAIN: CPT | Performed by: STUDENT IN AN ORGANIZED HEALTH CARE EDUCATION/TRAINING PROGRAM

## 2021-04-19 PROCEDURE — 99284 EMERGENCY DEPT VISIT MOD MDM: CPT

## 2021-04-19 PROCEDURE — 71045 X-RAY EXAM CHEST 1 VIEW: CPT

## 2021-04-19 PROCEDURE — 85025 COMPLETE CBC W/AUTO DIFF WBC: CPT | Performed by: EMERGENCY MEDICINE

## 2021-04-19 PROCEDURE — 87040 BLOOD CULTURE FOR BACTERIA: CPT | Performed by: EMERGENCY MEDICINE

## 2021-04-19 PROCEDURE — 93010 ELECTROCARDIOGRAM REPORT: CPT | Performed by: INTERNAL MEDICINE

## 2021-04-19 PROCEDURE — 94640 AIRWAY INHALATION TREATMENT: CPT

## 2021-04-19 PROCEDURE — 87633 RESP VIRUS 12-25 TARGETS: CPT | Performed by: STUDENT IN AN ORGANIZED HEALTH CARE EDUCATION/TRAINING PROGRAM

## 2021-04-19 PROCEDURE — 87636 SARSCOV2 & INF A&B AMP PRB: CPT | Performed by: EMERGENCY MEDICINE

## 2021-04-19 PROCEDURE — 87899 AGENT NOS ASSAY W/OPTIC: CPT | Performed by: STUDENT IN AN ORGANIZED HEALTH CARE EDUCATION/TRAINING PROGRAM

## 2021-04-19 PROCEDURE — 84484 ASSAY OF TROPONIN QUANT: CPT | Performed by: EMERGENCY MEDICINE

## 2021-04-19 RX ORDER — ASPIRIN 325 MG
325 TABLET ORAL AS NEEDED
COMMUNITY

## 2021-04-19 RX ORDER — METHYLPREDNISOLONE SODIUM SUCCINATE 125 MG/2ML
125 INJECTION, POWDER, LYOPHILIZED, FOR SOLUTION INTRAMUSCULAR; INTRAVENOUS ONCE
Status: COMPLETED | OUTPATIENT
Start: 2021-04-19 | End: 2021-04-19

## 2021-04-19 RX ORDER — BUDESONIDE AND FORMOTEROL FUMARATE DIHYDRATE 80; 4.5 UG/1; UG/1
2 AEROSOL RESPIRATORY (INHALATION)
Status: DISCONTINUED | OUTPATIENT
Start: 2021-04-19 | End: 2021-04-20 | Stop reason: HOSPADM

## 2021-04-19 RX ORDER — IPRATROPIUM BROMIDE AND ALBUTEROL SULFATE 2.5; .5 MG/3ML; MG/3ML
3 SOLUTION RESPIRATORY (INHALATION)
Status: DISCONTINUED | OUTPATIENT
Start: 2021-04-19 | End: 2021-04-20 | Stop reason: HOSPADM

## 2021-04-19 RX ORDER — SODIUM CHLORIDE 0.9 % (FLUSH) 0.9 %
10 SYRINGE (ML) INJECTION AS NEEDED
Status: DISCONTINUED | OUTPATIENT
Start: 2021-04-19 | End: 2021-04-20 | Stop reason: HOSPADM

## 2021-04-19 RX ORDER — ACETAMINOPHEN 325 MG/1
650 TABLET ORAL EVERY 4 HOURS PRN
Status: DISCONTINUED | OUTPATIENT
Start: 2021-04-19 | End: 2021-04-20 | Stop reason: HOSPADM

## 2021-04-19 RX ORDER — ONDANSETRON 4 MG/1
4 TABLET, FILM COATED ORAL EVERY 6 HOURS PRN
Status: DISCONTINUED | OUTPATIENT
Start: 2021-04-19 | End: 2021-04-20 | Stop reason: HOSPADM

## 2021-04-19 RX ORDER — KETOROLAC TROMETHAMINE 30 MG/ML
30 INJECTION, SOLUTION INTRAMUSCULAR; INTRAVENOUS EVERY 6 HOURS PRN
Status: DISCONTINUED | OUTPATIENT
Start: 2021-04-19 | End: 2021-04-20 | Stop reason: HOSPADM

## 2021-04-19 RX ORDER — PREDNISONE 20 MG/1
40 TABLET ORAL DAILY
Status: DISCONTINUED | OUTPATIENT
Start: 2021-04-20 | End: 2021-04-20 | Stop reason: HOSPADM

## 2021-04-19 RX ORDER — IPRATROPIUM BROMIDE AND ALBUTEROL SULFATE 2.5; .5 MG/3ML; MG/3ML
3 SOLUTION RESPIRATORY (INHALATION) ONCE
Status: COMPLETED | OUTPATIENT
Start: 2021-04-19 | End: 2021-04-19

## 2021-04-19 RX ORDER — ASPIRIN 325 MG
325 TABLET ORAL AS NEEDED
Status: DISCONTINUED | OUTPATIENT
Start: 2021-04-19 | End: 2021-04-19

## 2021-04-19 RX ORDER — LANOLIN ALCOHOL/MO/W.PET/CERES
5 CREAM (GRAM) TOPICAL NIGHTLY PRN
Status: DISCONTINUED | OUTPATIENT
Start: 2021-04-19 | End: 2021-04-20 | Stop reason: HOSPADM

## 2021-04-19 RX ORDER — ALBUTEROL SULFATE 2.5 MG/3ML
2.5 SOLUTION RESPIRATORY (INHALATION) EVERY 6 HOURS PRN
Status: DISCONTINUED | OUTPATIENT
Start: 2021-04-19 | End: 2021-04-20 | Stop reason: HOSPADM

## 2021-04-19 RX ORDER — SODIUM CHLORIDE 0.9 % (FLUSH) 0.9 %
10 SYRINGE (ML) INJECTION EVERY 12 HOURS SCHEDULED
Status: DISCONTINUED | OUTPATIENT
Start: 2021-04-19 | End: 2021-04-20 | Stop reason: HOSPADM

## 2021-04-19 RX ADMIN — IPRATROPIUM BROMIDE AND ALBUTEROL SULFATE 3 ML: 2.5; .5 SOLUTION RESPIRATORY (INHALATION) at 05:51

## 2021-04-19 RX ADMIN — IPRATROPIUM BROMIDE AND ALBUTEROL SULFATE 3 ML: 2.5; .5 SOLUTION RESPIRATORY (INHALATION) at 11:17

## 2021-04-19 RX ADMIN — IPRATROPIUM BROMIDE AND ALBUTEROL SULFATE 3 ML: 2.5; .5 SOLUTION RESPIRATORY (INHALATION) at 15:14

## 2021-04-19 RX ADMIN — IPRATROPIUM BROMIDE AND ALBUTEROL SULFATE 3 ML: 2.5; .5 SOLUTION RESPIRATORY (INHALATION) at 19:57

## 2021-04-19 RX ADMIN — METHYLPREDNISOLONE SODIUM SUCCINATE 125 MG: 125 INJECTION, POWDER, FOR SOLUTION INTRAMUSCULAR; INTRAVENOUS at 05:02

## 2021-04-19 RX ADMIN — ENOXAPARIN SODIUM 40 MG: 40 INJECTION SUBCUTANEOUS at 09:35

## 2021-04-19 RX ADMIN — BUDESONIDE AND FORMOTEROL FUMARATE DIHYDRATE 2 PUFF: 80; 4.5 AEROSOL RESPIRATORY (INHALATION) at 19:57

## 2021-04-19 RX ADMIN — BUDESONIDE AND FORMOTEROL FUMARATE DIHYDRATE 2 PUFF: 80; 4.5 AEROSOL RESPIRATORY (INHALATION) at 09:08

## 2021-04-19 NOTE — ED PROVIDER NOTES
"Subjective   60-year-old male with history of COPD that is not oxygen dependent at home presents to the emergency department with complaint of sudden onset of worsening shortness of breath this morning.  Reports inhaler and nebulizer treatment of albuterol at home did not help.  He drove himself to the ER and found to be 78% on room air upon arrival with respiratory rate in the 30s.  Denies any fevers, chills or recent illness.  Denies any known sick contacts.  Reports history of 1 previous hospitalization in which \"that tube was put in me upon arrival and I woke up 5 days later\".  Denies chest pain, lower extremity swelling.    Family history, surgical history, social history, current medications and allergies are reviewed with the patient and triage documentation and vitals are reviewed.      History provided by:  Patient and medical records   used: No        Review of Systems   Constitutional: Positive for diaphoresis. Negative for chills and fever.   HENT: Negative for congestion and sore throat.    Eyes: Negative for photophobia and visual disturbance.   Respiratory: Positive for chest tightness, shortness of breath and wheezing. Negative for cough.    Cardiovascular: Negative for chest pain, palpitations and leg swelling.   Gastrointestinal: Negative for abdominal pain, diarrhea, nausea and vomiting.   Endocrine: Negative for polydipsia, polyphagia and polyuria.   Genitourinary: Negative for dysuria, frequency and urgency.   Musculoskeletal: Negative for arthralgias, back pain, myalgias and neck pain.   Skin: Negative for rash and wound.   Allergic/Immunologic: Negative.    Neurological: Negative for weakness, light-headedness, numbness and headaches.   Hematological: Negative for adenopathy. Does not bruise/bleed easily.   Psychiatric/Behavioral: The patient is nervous/anxious.        Past Medical History:   Diagnosis Date   • Asthma    • COPD (chronic obstructive pulmonary disease) " (CMS/[facility])    • Erysipelas of lower extremity        Allergies   Allergen Reactions   • Shellfish-Derived Products Swelling       Past Surgical History:   Procedure Laterality Date   • INCISION AND DRAINAGE ABSCESS      right lateral chest wall   • ROTATOR CUFF REPAIR Bilateral 2008    Dr Fajardo       Family History   Problem Relation Age of Onset   • Arrhythmia Sister    • Coronary artery disease Brother         stent placement   • Heart attack Brother         CABG       Social History     Socioeconomic History   • Marital status:      Spouse name: Not on file   • Number of children: Not on file   • Years of education: Not on file   • Highest education level: Not on file   Tobacco Use   • Smoking status: Never Smoker   • Smokeless tobacco: Never Used   • Tobacco comment: around a lot of second hand smoke   Substance and Sexual Activity   • Alcohol use: Yes     Alcohol/week: 0.0 standard drinks     Comment: rare   • Drug use: Never   • Sexual activity: Defer           Objective   Physical Exam  Vitals and nursing note reviewed.   Constitutional:       General: He is not in acute distress.     Appearance: He is well-developed and normal weight. He is diaphoretic. He is not ill-appearing or toxic-appearing.   HENT:      Head: Normocephalic.      Mouth/Throat:      Mouth: Mucous membranes are moist.   Eyes:      Pupils: Pupils are equal, round, and reactive to light.   Neck:      Vascular: No JVD.   Cardiovascular:      Rate and Rhythm: Regular rhythm. Tachycardia present.  No extrasystoles are present.     Pulses: Normal pulses.   Pulmonary:      Effort: Tachypnea, accessory muscle usage and respiratory distress present.      Breath sounds: Examination of the right-upper field reveals wheezing. Examination of the left-upper field reveals wheezing. Examination of the right-lower field reveals decreased breath sounds and wheezing. Examination of the left-lower field reveals decreased breath sounds and wheezing.  Decreased breath sounds and wheezing present. No rhonchi or rales.   Chest:      Chest wall: No tenderness or crepitus.   Abdominal:      General: Bowel sounds are normal.      Palpations: Abdomen is soft.      Tenderness: There is no abdominal tenderness.   Musculoskeletal:         General: Normal range of motion.      Cervical back: Normal range of motion and neck supple.      Right lower leg: No tenderness. No edema.      Left lower leg: No tenderness. No edema.   Skin:     General: Skin is warm.      Capillary Refill: Capillary refill takes less than 2 seconds.      Coloration: Skin is pale.   Neurological:      General: No focal deficit present.      Mental Status: He is alert and oriented to person, place, and time.   Psychiatric:         Mood and Affect: Mood is anxious.         Behavior: Behavior normal.         Procedures  none         ED Course      Labs Reviewed   COMPREHENSIVE METABOLIC PANEL - Abnormal; Notable for the following components:       Result Value    Glucose 145 (*)     CO2 33.0 (*)     All other components within normal limits    Narrative:     GFR Normal >60  Chronic Kidney Disease <60  Kidney Failure <15     D-DIMER, QUANTITATIVE - Abnormal; Notable for the following components:    D-Dimer, Quantitative 485 (*)     All other components within normal limits    Narrative:     Dimer values <500 ng/ml FEU are FDA approved as aid in diagnosis of deep venous thrombosis and pulmonary embolism.  This test should not be used in an exclusion strategy with pretest probability alone.    A recent guideline regarding diagnosis for pulmonary thromboembolism recommends an adjusted exclusion criterion of age x 10 ng/ml FEU for patients >50 years of age ([employer] Intern Med 2015; 163: 701-711).     CBC WITH AUTO DIFFERENTIAL - Abnormal; Notable for the following components:    Hematocrit 53.9 (*)     Immature Grans % 1.0 (*)     Lymphocytes, Absolute 3.33 (*)     Monocytes, Absolute 1.14 (*)     Eosinophils,  Absolute 0.65 (*)     Immature Grans, Absolute 0.11 (*)     All other components within normal limits   LACTIC ACID, PLASMA - Abnormal; Notable for the following components:    Lactate 3.7 (*)     All other components within normal limits   BLOOD GAS, ARTERIAL - Abnormal; Notable for the following components:    pH, Arterial 7.268 (*)     pCO2, Arterial 65.4 (*)     HCO3, Arterial 29.9 (*)     All other components within normal limits   COVID-19 AND FLU A/B, NP SWAB IN TRANSPORT MEDIA 8-12 HR TAT - Normal    Narrative:     Fact sheet for providers: https://www.fda.gov/media/584666/download    Fact sheet for patients: https://www.fda.gov/media/661426/download    Test performed by PCR.   BNP (IN-HOUSE) - Normal    Narrative:     Among patients with dyspnea, NT-proBNP is highly sensitive for the detection of acute congestive heart failure. In addition NT-proBNP of <300 pg/ml effectively rules out acute congestive heart failure with 99% negative predictive value.    Results may be falsely decreased if patient taking Biotin.     TROPONIN (IN-HOUSE) - Normal    Narrative:     Troponin T Reference Range:  <= 0.03 ng/mL-   Negative for AMI  >0.03 ng/mL-     Abnormal for myocardial necrosis.  Clinicians would have to utilize clinical acumen, EKG, Troponin and serial changes to determine if it is an Acute Myocardial Infarction or myocardial injury due to an underlying chronic condition.       Results may be falsely decreased if patient taking Biotin.     MAGNESIUM - Normal   BLOOD CULTURE   BLOOD CULTURE   BLOOD GAS, ARTERIAL   LACTIC ACID, REFLEX   CBC AND DIFFERENTIAL    Narrative:     The following orders were created for panel order CBC & Differential.  Procedure                               Abnormality         Status                     ---------                               -----------         ------                     CBC Auto Differential[[id number]]        Abnormal            Final result                 Please  view results for these tests on the individual orders.   EXTRA TUBES    Narrative:     The following orders were created for panel order Extra Tubes.  Procedure                               Abnormality         Status                     ---------                               -----------         ------                     Gold Top - SST[[id number]]                                   In process                   Please view results for these tests on the individual orders.   GOLD TOP - SST     No results found.    EKG April 19, 2021 at 0 458 reveals sinus tachycardia with significant baseline artifact at a rate of 114 bpm.  No obvious ST elevation or depression.        MDM  Number of Diagnoses or Management Options     Amount and/or Complexity of Data Reviewed  Clinical lab tests: reviewed  Tests in the radiology section of CPT®: reviewed  Discuss the patient with other providers: yes    Patient Progress  Patient progress: stable    Patient found to be flu and Covid negative.  Chest x-ray reveals no focal infiltrate.  ABG reveals hypoxia and hypercapnia with a pH of 7.26.  After negative Covid testing patient is placed on BiPAP and given DuoNeb.  He was given Solu-Medrol upon arrival and placed on nonrebreather.  Oxygen saturation improved to 97% with continued work of breathing but is now being placed on BiPAP.  Lactic acid slightly elevated likely from work of breathing.  D-dimer slightly elevated but appropriate for age-adjusted.  White blood cell count not elevated and no obvious source of infection.  Patient will be admitted to the family medicine service.    Final diagnoses:   COPD exacerbation (CMS/HCC)   Acute respiratory failure with hypoxia and hypercapnia (CMS/HCC)       ED Disposition  ED Disposition     ED Disposition Condition Comment    Decision to Admit  Level of Care: Med/Surg [1]   Diagnosis: COPD exacerbation (CMS/HCC) [978930]   Admitting Physician: NORBERTO CAPUTO [[id number]]            No  follow-up provider specified.       Medication List      No changes were made to your prescriptions during this visit.          Gunnar Eddy DO  04/19/21 0557

## 2021-04-19 NOTE — PAYOR COMM NOTE
"Reva Cid   [facility]  phone [phone]  fax [phone]    Ref# [id number]    Margarito Diop (60 y.o. Male)     Date of Birth Social Security Number Address Home Phone MRN    1960  [address] [facility] [address] [phone] [id number]    Mandaeism Marital Status          Restorationism        Admission Date Admission Type Admitting Provider Attending Provider Department, Room/Bed    4/19/21 Emergency Phil Lee MD Romines, Kyle A, MD [facility] 3 [place], 322/1    Discharge Date Discharge Disposition Discharge Destination                       Attending Provider: Nicholas Ryan MD    Allergies: Shellfish-derived Products    Isolation: None   Infection: None   Code Status: CPR    Ht: 165.1 cm (65\")   Wt: 74.8 kg (164 lb 12.8 oz)    Admission Cmt: None   Principal Problem: Acute respiratory failure with hypercapnia (CMS/HCC) [J96.02] More...                 Active Insurance as of 4/19/2021     Primary Coverage     Payor Plan Insurance Group Employer/Plan Group    [employer] BLUE [employer]OSS [employer] BLUE [employer]OSS BLUE [employer] PPO [id number]     Payor Plan Address Payor Plan Phone Number Payor Plan Fax Number Effective Dates    [address] [phone]  7/1/2020 - None Entered    [facility] [id number]       Subscriber Name Subscriber Birth Date Member ID       MARGARITO DIOP 1960 [id number]                 Emergency Contacts      (Rel.) Home Phone Work Phone Mobile Phone    Mariola Diop (Spouse) [phone] -- [phone]            History & Physical    No notes of this type exist for this encounter.            Emergency Department Notes      Gunnar Eddy DO at 04/19/21 0455          Subjective   60-year-old male with history of COPD that is not oxygen dependent at home presents to the emergency department with complaint of sudden onset of worsening shortness of breath this morning.  Reports inhaler and nebulizer treatment of " "albuterol at home did not help.  He drove himself to the ER and found to be 78% on room air upon arrival with respiratory rate in the 30s.  Denies any fevers, chills or recent illness.  Denies any known sick contacts.  Reports history of 1 previous hospitalization in which \"that tube was put in me upon arrival and I woke up 5 days later\".  Denies chest pain, lower extremity swelling.    Family history, surgical history, social history, current medications and allergies are reviewed with the patient and triage documentation and vitals are reviewed.      History provided by:  Patient and medical records   used: No        Review of Systems   Constitutional: Positive for diaphoresis. Negative for chills and fever.   HENT: Negative for congestion and sore throat.    Eyes: Negative for photophobia and visual disturbance.   Respiratory: Positive for chest tightness, shortness of breath and wheezing. Negative for cough.    Cardiovascular: Negative for chest pain, palpitations and leg swelling.   Gastrointestinal: Negative for abdominal pain, diarrhea, nausea and vomiting.   Endocrine: Negative for polydipsia, polyphagia and polyuria.   Genitourinary: Negative for dysuria, frequency and urgency.   Musculoskeletal: Negative for arthralgias, back pain, myalgias and neck pain.   Skin: Negative for rash and wound.   Allergic/Immunologic: Negative.    Neurological: Negative for weakness, light-headedness, numbness and headaches.   Hematological: Negative for adenopathy. Does not bruise/bleed easily.   Psychiatric/Behavioral: The patient is nervous/anxious.        Past Medical History:   Diagnosis Date   • Asthma    • COPD (chronic obstructive pulmonary disease) (CMS/[facility])    • Erysipelas of lower extremity        Allergies   Allergen Reactions   • Shellfish-Derived Products Swelling       Past Surgical History:   Procedure Laterality Date   • INCISION AND DRAINAGE ABSCESS      right lateral chest wall   • " ROTATOR CUFF REPAIR Bilateral 2008    Dr Fajardo       Family History   Problem Relation Age of Onset   • Arrhythmia Sister    • Coronary artery disease Brother         stent placement   • Heart attack Brother         CABG       Social History     Socioeconomic History   • Marital status:      Spouse name: Not on file   • Number of children: Not on file   • Years of education: Not on file   • Highest education level: Not on file   Tobacco Use   • Smoking status: Never Smoker   • Smokeless tobacco: Never Used   • Tobacco comment: around a lot of second hand smoke   Substance and Sexual Activity   • Alcohol use: Yes     Alcohol/week: 0.0 standard drinks     Comment: rare   • Drug use: Never   • Sexual activity: Defer           Objective   Physical Exam  Vitals and nursing note reviewed.   Constitutional:       General: He is not in acute distress.     Appearance: He is well-developed and normal weight. He is diaphoretic. He is not ill-appearing or toxic-appearing.   HENT:      Head: Normocephalic.      Mouth/Throat:      Mouth: Mucous membranes are moist.   Eyes:      Pupils: Pupils are equal, round, and reactive to light.   Neck:      Vascular: No JVD.   Cardiovascular:      Rate and Rhythm: Regular rhythm. Tachycardia present.  No extrasystoles are present.     Pulses: Normal pulses.   Pulmonary:      Effort: Tachypnea, accessory muscle usage and respiratory distress present.      Breath sounds: Examination of the right-upper field reveals wheezing. Examination of the left-upper field reveals wheezing. Examination of the right-lower field reveals decreased breath sounds and wheezing. Examination of the left-lower field reveals decreased breath sounds and wheezing. Decreased breath sounds and wheezing present. No rhonchi or rales.   Chest:      Chest wall: No tenderness or crepitus.   Abdominal:      General: Bowel sounds are normal.      Palpations: Abdomen is soft.      Tenderness: There is no abdominal  tenderness.   Musculoskeletal:         General: Normal range of motion.      Cervical back: Normal range of motion and neck supple.      Right lower leg: No tenderness. No edema.      Left lower leg: No tenderness. No edema.   Skin:     General: Skin is warm.      Capillary Refill: Capillary refill takes less than 2 seconds.      Coloration: Skin is pale.   Neurological:      General: No focal deficit present.      Mental Status: He is alert and oriented to person, place, and time.   Psychiatric:         Mood and Affect: Mood is anxious.         Behavior: Behavior normal.         Procedures  none        ED Course      Labs Reviewed   COMPREHENSIVE METABOLIC PANEL - Abnormal; Notable for the following components:       Result Value    Glucose 145 (*)     CO2 33.0 (*)     All other components within normal limits    Narrative:     GFR Normal >60  Chronic Kidney Disease <60  Kidney Failure <15     D-DIMER, QUANTITATIVE - Abnormal; Notable for the following components:    D-Dimer, Quantitative 485 (*)     All other components within normal limits    Narrative:     Dimer values <500 ng/ml FEU are FDA approved as aid in diagnosis of deep venous thrombosis and pulmonary embolism.  This test should not be used in an exclusion strategy with pretest probability alone.    A recent guideline regarding diagnosis for pulmonary thromboembolism recommends an adjusted exclusion criterion of age x 10 ng/ml FEU for patients >50 years of age ([employer]ina Intern Med 2015; 163: 701-711).     CBC WITH AUTO DIFFERENTIAL - Abnormal; Notable for the following components:    Hematocrit 53.9 (*)     Immature Grans % 1.0 (*)     Lymphocytes, Absolute 3.33 (*)     Monocytes, Absolute 1.14 (*)     Eosinophils, Absolute 0.65 (*)     Immature Grans, Absolute 0.11 (*)     All other components within normal limits   LACTIC ACID, PLASMA - Abnormal; Notable for the following components:    Lactate 3.7 (*)     All other components within normal limits   BLOOD  GAS, ARTERIAL - Abnormal; Notable for the following components:    pH, Arterial 7.268 (*)     pCO2, Arterial 65.4 (*)     HCO3, Arterial 29.9 (*)     All other components within normal limits   COVID-19 AND FLU A/B, NP SWAB IN TRANSPORT MEDIA 8-12 HR TAT - Normal    Narrative:     Fact sheet for providers: https://www.fda.gov/media/609321/download    Fact sheet for patients: https://www.fda.gov/media/790500/download    Test performed by PCR.   BNP (IN-HOUSE) - Normal    Narrative:     Among patients with dyspnea, NT-proBNP is highly sensitive for the detection of acute congestive heart failure. In addition NT-proBNP of <300 pg/ml effectively rules out acute congestive heart failure with 99% negative predictive value.    Results may be falsely decreased if patient taking Biotin.     TROPONIN (IN-HOUSE) - Normal    Narrative:     Troponin T Reference Range:  <= 0.03 ng/mL-   Negative for AMI  >0.03 ng/mL-     Abnormal for myocardial necrosis.  Clinicians would have to utilize clinical acumen, EKG, Troponin and serial changes to determine if it is an Acute Myocardial Infarction or myocardial injury due to an underlying chronic condition.       Results may be falsely decreased if patient taking Biotin.     MAGNESIUM - Normal   BLOOD CULTURE   BLOOD CULTURE   BLOOD GAS, ARTERIAL   LACTIC ACID, REFLEX   CBC AND DIFFERENTIAL    Narrative:     The following orders were created for panel order CBC & Differential.  Procedure                               Abnormality         Status                     ---------                               -----------         ------                     CBC Auto Differential[[id number]]        Abnormal            Final result                 Please view results for these tests on the individual orders.   EXTRA TUBES    Narrative:     The following orders were created for panel order Extra Tubes.  Procedure                               Abnormality         Status                     ---------                                -----------         ------                     [facility][[id number]]                                   In process                   Please view results for these tests on the individual orders.   GOLD TOP - SST     No results found.    EKG April 19, 2021 at 0 458 reveals sinus tachycardia with significant baseline artifact at a rate of 114 bpm.  No obvious ST elevation or depression.        MDM  Number of Diagnoses or Management Options     Amount and/or Complexity of Data Reviewed  Clinical lab tests: reviewed  Tests in the radiology section of CPT®: reviewed  Discuss the patient with other providers: yes    Patient Progress  Patient progress: stable    Patient found to be flu and Covid negative.  Chest x-ray reveals no focal infiltrate.  ABG reveals hypoxia and hypercapnia with a pH of 7.26.  After negative Covid testing patient is placed on BiPAP and given DuoNeb.  He was given Solu-Medrol upon arrival and placed on nonrebreather.  Oxygen saturation improved to 97% with continued work of breathing but is now being placed on BiPAP.  Lactic acid slightly elevated likely from work of breathing.  D-dimer slightly elevated but appropriate for age-adjusted.  White blood cell count not elevated and no obvious source of infection.  Patient will be admitted to the family medicine service.    Final diagnoses:   COPD exacerbation (CMS/HCC)   Acute respiratory failure with hypoxia and hypercapnia (CMS/HCC)       ED Disposition  ED Disposition     ED Disposition Condition Comment    Decision to Admit  Level of Care: Med/Surg [1]   Diagnosis: COPD exacerbation (CMS/HCC) [201670]   Admitting Physician: NORBERTO CAPUTO [[id number]]            No follow-up provider specified.       Medication List      No changes were made to your prescriptions during this visit.          Gunnar Eddy DO  04/19/21 0557      Electronically signed by Gunnar Eddy DO at 04/19/21 0557         Lab Results  (last 24 hours)     Procedure Component Value Units Date/Time    S. Pneumo Ag Urine or CSF - Urine, Urine, Clean Catch [[id number]]  (Normal) Collected: 04/19/21 0917    Specimen: Urine, Clean Catch Updated: 04/19/21 0950     Strep Pneumo Ag Negative    Respiratory Panel, PCR (WITHOUT COVID) - Swab, Nasopharynx [[id number]]  (Normal) Collected: 04/19/21 0510    Specimen: Swab from Nasopharynx Updated: 04/19/21 0926     ADENOVIRUS, PCR Not Detected     Coronavirus 229E Not Detected     Coronavirus HKU1 Not Detected     Coronavirus NL63 Not Detected     Coronavirus OC43 Not Detected     Human Metapneumovirus Not Detected     Human Rhinovirus/Enterovirus Not Detected     Influenza B PCR Not Detected     Parainfluenza Virus 1 Not Detected     Parainfluenza Virus 2 Not Detected     Parainfluenza Virus 3 Not Detected     Parainfluenza Virus 4 Not Detected     Bordetella pertussis pcr Not Detected     Chlamydophila pneumoniae PCR Not Detected     Mycoplasma pneumo by PCR Not Detected     Influenza A PCR Not Detected     RSV, PCR Not Detected     Bordetella parapertussis PCR Not Detected    Narrative:      The coronavirus on the RVP is NOT COVID-19 and is NOT indicative of infection with COVID-19.     Blood Gas, Arterial - [[id number]]  (Abnormal) Collected: 04/19/21 0915    Specimen: Arterial Blood Updated: 04/19/21 0920     Site Arterial Line     Jakub's Test Positive     pH, Arterial 7.393 pH units      pCO2, Arterial 50.5 mm Hg      Comment: 83 Value above reference range        pO2, Arterial 275.0 mm Hg      Comment: 83 Value above reference range        HCO3, Arterial 30.8 mmol/L      Comment: 83 Value above reference range        Base Excess, Arterial 4.3 mmol/L      Comment: 83 Value above reference range        O2 Saturation, Arterial >100.0 %      Comment: 93 Value above reportable range > 100.0        Barometric Pressure for Blood Gas 747 mmHg      Modality NRB     FIO2 100 %      Flow Rate 15.0 lpm      Ventilator  Mode NA     Collected by RT     Comment: Meter: [id number]     :  [id number]       Extra Tubes [[id number]] Collected: 04/19/21 0812    Specimen: Blood, Venous Line Updated: 04/19/21 0915    Narrative:      The following orders were created for panel order Extra Tubes.  Procedure                               Abnormality         Status                     ---------                               -----------         ------                     Green Top (Gel)[[id number]]                                  Final result                 Please view results for these tests on the individual orders.    Green Top (Gel) [[id number]] Collected: 04/19/21 0812    Specimen: Blood Updated: 04/19/21 0915     Extra Tube Hold for add-ons.     Comment: Auto resulted.       Timed Lactic Acid, Reflex [[id number]]  (Normal) Collected: 04/19/21 0805    Specimen: Blood Updated: 04/19/21 0836     Lactate 1.0 mmol/L     CBC & Differential [[id number]]  (Abnormal) Collected: 04/19/21 0812    Specimen: Blood Updated: 04/19/21 0822    Narrative:      The following orders were created for panel order CBC & Differential.  Procedure                               Abnormality         Status                     ---------                               -----------         ------                     CBC Auto Differential[[id number]]        Abnormal            Final result                 Please view results for these tests on the individual orders.    CBC Auto Differential [[id number]]  (Abnormal) Collected: 04/19/21 0812    Specimen: Blood Updated: 04/19/21 0822     WBC 10.53 10*3/mm3      RBC 5.24 10*6/mm3      Hemoglobin 16.2 g/dL      Hematocrit 48.2 %      MCV 92.0 fL      MCH 30.9 pg      MCHC 33.6 g/dL      RDW 13.2 %      RDW-SD 44.8 fl      MPV 9.7 fL      Platelets 253 10*3/mm3      Neutrophil % 93.5 %      Lymphocyte % 4.4 %      Monocyte % 1.1 %      Eosinophil % 0.2 %      Basophil % 0.4 %      Immature Grans % 0.4 %      Neutrophils,  Absolute 9.85 10*3/mm3      Lymphocytes, Absolute 0.46 10*3/mm3      Monocytes, Absolute 0.12 10*3/mm3      Eosinophils, Absolute 0.02 10*3/mm3      Basophils, Absolute 0.04 10*3/mm3      Immature Grans, Absolute 0.04 10*3/mm3      nRBC 0.0 /100 WBC     Extra Tubes [[id number]] Collected: 04/19/21 0507    Specimen: Blood, Venous Line Updated: 04/19/21 0615    Narrative:      The following orders were created for panel order Extra Tubes.  Procedure                               Abnormality         Status                     ---------                               -----------         ------                     Gold Top - SST[[id number]]                                   Final result                 Please view results for these tests on the individual orders.    Gold Top - SST [[id number]] Collected: 04/19/21 0507    Specimen: Blood Updated: 04/19/21 0615     Extra Tube Hold for add-ons.     Comment: Auto resulted.       Comprehensive Metabolic Panel [[id number]]  (Abnormal) Collected: 04/19/21 0506    Specimen: Blood Updated: 04/19/21 0542     Glucose 145 mg/dL      BUN 10 mg/dL      Creatinine 0.78 mg/dL      Sodium 141 mmol/L      Potassium 4.2 mmol/L      Chloride 101 mmol/L      CO2 33.0 mmol/L      Calcium 9.3 mg/dL      Total Protein 7.6 g/dL      Albumin 4.30 g/dL      ALT (SGPT) 28 U/L      AST (SGOT) 24 U/L      Alkaline Phosphatase 98 U/L      Total Bilirubin 0.3 mg/dL      eGFR Non African Amer 102 mL/min/1.73      Globulin 3.3 gm/dL      A/G Ratio 1.3 g/dL      BUN/Creatinine Ratio 12.8     Anion Gap 7.0 mmol/L     Narrative:      GFR Normal >60  Chronic Kidney Disease <60  Kidney Failure <15      Magnesium [[id number]]  (Normal) Collected: 04/19/21 0506    Specimen: Blood Updated: 04/19/21 0542     Magnesium 2.2 mg/dL     Troponin [[id number]]  (Normal) Collected: 04/19/21 0506    Specimen: Blood Updated: 04/19/21 0541     Troponin T <0.010 ng/mL     Narrative:      Troponin T Reference Range:  <= 0.03 ng/mL-    Negative for AMI  >0.03 ng/mL-     Abnormal for myocardial necrosis.  Clinicians would have to utilize clinical acumen, EKG, Troponin and serial changes to determine if it is an Acute Myocardial Infarction or myocardial injury due to an underlying chronic condition.       Results may be falsely decreased if patient taking Biotin.      Lactic Acid, Plasma [[id number]]  (Abnormal) Collected: 04/19/21 0507    Specimen: Blood Updated: 04/19/21 0541     Lactate 3.7 mmol/L     BNP [[id number]]  (Normal) Collected: 04/19/21 0506    Specimen: Blood Updated: 04/19/21 0540     proBNP 34.9 pg/mL     Narrative:      Among patients with dyspnea, NT-proBNP is highly sensitive for the detection of acute congestive heart failure. In addition NT-proBNP of <300 pg/ml effectively rules out acute congestive heart failure with 99% negative predictive value.    Results may be falsely decreased if patient taking Biotin.      COVID-19 and FLU A/B PCR - Swab, Nasopharynx [[id number]]  (Normal) Collected: 04/19/21 0510    Specimen: Swab from Nasopharynx Updated: 04/19/21 0539     COVID19 Not Detected     Influenza A PCR Not Detected     Influenza B PCR Not Detected    Narrative:      Fact sheet for providers: https://www.fda.gov/media/739314/download    Fact sheet for patients: https://www.fda.gov/media/879703/download    Test performed by PCR.    Blood Culture - Blood, Arm, Left [[id number]] Collected: 04/19/21 0532    Specimen: Blood from Arm, Left Updated: 04/19/21 0532    D-dimer, Quantitative [[id number]]  (Abnormal) Collected: 04/19/21 0506    Specimen: Blood Updated: 04/19/21 0532     D-Dimer, Quantitative 485 ng/mL (FEU)     Narrative:      Dimer values <500 ng/ml FEU are FDA approved as aid in diagnosis of deep venous thrombosis and pulmonary embolism.  This test should not be used in an exclusion strategy with pretest probability alone.    A recent guideline regarding diagnosis for pulmonary thromboembolism recommends an adjusted  exclusion criterion of age x 10 ng/ml FEU for patients >50 years of age ([employer] Intern Med 2015; 163: 701-711).      Blood Gas, Arterial - [[id number]]  (Abnormal) Collected: 04/19/21 0509    Specimen: Arterial Blood Updated: 04/19/21 0518     Site Arterial Line     Jakub's Test Positive     pH, Arterial 7.268 pH units      Comment: 84 Value below reference range        pCO2, Arterial 65.4 mm Hg      Comment: 83 Value above reference range        pO2, Arterial 84.3 mm Hg      HCO3, Arterial 29.9 mmol/L      Comment: 83 Value above reference range        Base Excess, Arterial 0.6 mmol/L      O2 Saturation, Arterial 95.0 %      Barometric Pressure for Blood Gas 746 mmHg      Modality Nasal Cannula     Flow Rate 5.0 lpm      Ventilator Mode NA     Collected by RT     Comment: Meter: [id number]     :  [id number]       CBC & Differential [[id number]]  (Abnormal) Collected: 04/19/21 0506    Specimen: Blood Updated: 04/19/21 0514    Narrative:      The following orders were created for panel order CBC & Differential.  Procedure                               Abnormality         Status                     ---------                               -----------         ------                     CBC Auto Differential[[id number]]        Abnormal            Final result                 Please view results for these tests on the individual orders.    CBC Auto Differential [[id number]]  (Abnormal) Collected: 04/19/21 0506    Specimen: Blood Updated: 04/19/21 0514     WBC 10.54 10*3/mm3      RBC 5.69 10*6/mm3      Hemoglobin 17.5 g/dL      Hematocrit 53.9 %      MCV 94.7 fL      MCH 30.8 pg      MCHC 32.5 g/dL      RDW 13.4 %      RDW-SD 47.0 fl      MPV 10.0 fL      Platelets 349 10*3/mm3      Neutrophil % 49.5 %      Lymphocyte % 31.6 %      Monocyte % 10.8 %      Eosinophil % 6.2 %      Basophil % 0.9 %      Immature Grans % 1.0 %      Neutrophils, Absolute 5.21 10*3/mm3      Lymphocytes, Absolute 3.33 10*3/mm3      Monocytes,  Absolute 1.14 10*3/mm3      Eosinophils, Absolute 0.65 10*3/mm3      Basophils, Absolute 0.10 10*3/mm3      Immature Grans, Absolute 0.11 10*3/mm3      nRBC 0.0 /100 WBC     Blood Culture - Blood, Arm, Right [[id number]] Collected: 04/19/21 0507    Specimen: Blood from Arm, Right Updated: 04/19/21 0511        Imaging Results (Last 24 Hours)     Procedure Component Value Units Date/Time    XR Chest 1 View [[id number]] Collected: 04/19/21 0542     Updated: 04/19/21 0612    Narrative:      PORTABLE CHEST X-RAY    CLINICAL HISTORY: short of breath    COMPARISON: 5/6/2020.    FINDINGS: Portable AP view of the chest was obtained with  overlying monitor leads in place. ET and NG tube have been  removed. There is mild chronic elevation of the right diaphragm.  There is no active airspace disease, edema, or pleural fluid.  Stable cardiomegaly. There are extensive soft tissue  calcifications again noted over the bilateral thorax and lower  neck.      Impression:      No active disease      Electronically signed by:  Nayan Sweeney MD  4/19/2021 6:11 AM  CDT Workstation: [id number]        ECG/EMG Results (last 24 hours)     Procedure Component Value Units Date/Time    ECG 12 Lead [[id number]] Collected: 04/19/21 0457     Updated: 04/19/21 0528          Physician Progress Notes (last 24 hours) (Notes from 04/18/21 1050 through 04/19/21 1050)    No notes of this type exist for this encounter.         Consult Notes (last 24 hours) (Notes from 04/18/21 1050 through 04/19/21 1050)    No notes of this type exist for this encounter.

## 2021-04-19 NOTE — ACP (ADVANCE CARE PLANNING)
To Timmy Diop about his CODE STATUS.  He indicated that he wished to be a full code with CPR and intubation if necessary.  He expressed a desire for his wife Mariola Diop phone #[phone] to be his decision maker in the event he became incapacitated and unable to make decisions for himself.      This document has been electronically signed by Nicholas Ryan MD on April 19, 2021 11:42 CDT

## 2021-04-19 NOTE — H&P
HISTORY AND PHYSICAL  NAME: Timmy Diop  : 1960  MRN: [id number]    DATE OF ADMISSION:  2021     DATE & TIME SEEN: 21 at 6:27 am    PCP: Arvin Ness MD    CODE STATUS: Full code    CHIEF COMPLAINT: Acute hypercapnic respiratory failure secondary to COPD exacerbation     HPI:  Timmy Diop is a 60 y.o. otherwise healthy male with a CMH of COPD who presents complaining of wheezing and shortness of breath.  He reports no increase in sputum production does have a dry, hacking cough.    The patient says that he woke up this morning with chest tightness, shortness of breath, and difficulty speaking.  He used a home nebulizer treatment that failed to improve his symptoms.  He then used his albuterol rescue inhaler and Spiriva inhaler with no relief.    Mr. Diop does not know whether his COPD is related to emphysema or chronic bronchitis.  He states that he does not think he has ever had a pulmonary function test.    Mr. Diop does not require home oxygen. He was last hospitalized for COPD exacerbation in  of last year. He required intubation at that time and since follows with Dr. Arvin Ness as his PCP.  He has never smoked but has a wife who is smoked and says that he believes his COPD is the result of years of secondhand smoke.    The patient reports no sick contacts and no seasonal allergies.    He presented to the emergency department and had respiratory acidosis on initial ABG.  He desatted to the 70s on room air and was given IV methylprednisolone and a DuoNeb treatment x1 and started on a nonrebreather mask at 15 L and 35% oxygen and his oxygen saturation improved to 99%.    Of note, he was diagnosed with dermatomyositis as a child and still has subcutaneous calcifications in the armpits and across his lower abdomen and groin.  He states that he has never had pulmonary function testing.  He had a possible reaction to an antibiotic after his rotator cuff  surgery.        CONCURRENT MEDICAL HISTORY:  Past Medical History:   Diagnosis Date   • Asthma    • COPD (chronic obstructive pulmonary disease) (CMS/[facility])    • Erysipelas of lower extremity        PAST SURGICAL HISTORY:  Past Surgical History:   Procedure Laterality Date   • INCISION AND DRAINAGE ABSCESS      right lateral chest wall   • ROTATOR CUFF REPAIR Bilateral 2008    Dr Fajardo       FAMILY HISTORY:  Family History   Problem Relation Age of Onset   • Arrhythmia Sister    • Coronary artery disease, Diabetes Brother         stent placement   • Heart attack, Diabetes  Brother         CABG        SOCIAL HISTORY:  Social History     Socioeconomic History   • Marital status:      Spouse name: Not on file   • Number of children: Not on file   • Years of education: Not on file   • Highest education level: Not on file   Tobacco Use   • Smoking status: Never Smoker   • Smokeless tobacco: Never Used   • Tobacco comment: around a lot of second hand smoke   Vaping Use   • Vaping Use: Never used   Substance and Sexual Activity   • Alcohol use: Yes     Alcohol/week: 0.0 standard drinks     Comment: rare   • Drug use: Never   • Sexual activity: Defer       HOME MEDICATIONS:  Prior to Admission medications    Medication Sig Start Date End Date Taking? Authorizing Provider   albuterol (ACCUNEB) 1.25 MG/3ML nebulizer solution Take 3 mL by nebulization Every 6 (Six) Hours As Needed for Wheezing. 8/31/20   Arvin Ness MD   albuterol sulfate  (90 Base) MCG/ACT inhaler Inhale 2 puffs Every 6 (Six) Hours As Needed for Wheezing. 3/29/21   Arvin Ness MD   tiotropium bromide monohydrate (Spiriva Respimat) 2.5 MCG/ACT aerosol solution inhaler Inhale 2 puffs Daily. 3/29/21   Arvin Ness MD               ALLERGIES:  Shellfish-derived products    REVIEW OF SYSTEMS  Review of Systems   Constitutional: Positive for fatigue. Negative for fever.   HENT: Positive for sore throat. Negative for congestion,  sinus pressure, sinus pain and trouble swallowing.    Respiratory: Positive for cough (dry hacking), chest tightness, shortness of breath and wheezing.    Cardiovascular: Negative for chest pain and palpitations.   Gastrointestinal: Negative for nausea and vomiting.   Neurological: Positive for dizziness. Negative for syncope, weakness and light-headedness.       PHYSICAL EXAM:  Temp:  [97.1 °F (36.2 °C)-98.6 °F (37 °C)] 98.6 °F (37 °C)  Heart Rate:  [] 97  Resp:  [20-30] 20  BP: (134-178)/(81-92) 134/86  Body mass index is 27.42 kg/m².  Physical Exam  Constitutional:       General: He is not in acute distress.     Appearance: He is not ill-appearing, toxic-appearing or diaphoretic.   HENT:      Head: Normocephalic and atraumatic.   Cardiovascular:      Rate and Rhythm: Normal rate and regular rhythm.   Pulmonary:      Effort: No respiratory distress.      Breath sounds: Wheezing (Expiratory and inspiratory wheezes in all lung fields bilaterally) present.      Comments: Decreased air movement bilaterally  Abdominal:      Tenderness: There is no abdominal tenderness.   Musculoskeletal:      Right lower leg: No edema.      Left lower leg: No edema.         DIAGNOSTIC DATA:   Lab Results (last 24 hours)     Procedure Component Value Units Date/Time    Respiratory Culture - Sputum, Cough [[id number]] Collected: 04/19/21 1144    Specimen: Sputum from Cough Updated: 04/19/21 1424     Gram Stain Rare (1+) Squamous epithelial cells      Moderate (3+) WBCs per low power field      Mixed bacterial paresh    S. Pneumo Ag Urine or CSF - Urine, Urine, Clean Catch [[id number]]  (Normal) Collected: 04/19/21 0917    Specimen: Urine, Clean Catch Updated: 04/19/21 0950     Strep Pneumo Ag Negative    Respiratory Panel, PCR (WITHOUT COVID) - Swab, Nasopharynx [[id number]]  (Normal) Collected: 04/19/21 0510    Specimen: Swab from Nasopharynx Updated: 04/19/21 0926     ADENOVIRUS, PCR Not Detected     Coronavirus 229E Not Detected      Coronavirus HKU1 Not Detected     Coronavirus NL63 Not Detected     Coronavirus OC43 Not Detected     Human Metapneumovirus Not Detected     Human Rhinovirus/Enterovirus Not Detected     Influenza B PCR Not Detected     Parainfluenza Virus 1 Not Detected     Parainfluenza Virus 2 Not Detected     Parainfluenza Virus 3 Not Detected     Parainfluenza Virus 4 Not Detected     Bordetella pertussis pcr Not Detected     Chlamydophila pneumoniae PCR Not Detected     Mycoplasma pneumo by PCR Not Detected     Influenza A PCR Not Detected     RSV, PCR Not Detected     Bordetella parapertussis PCR Not Detected    Narrative:      The coronavirus on the RVP is NOT COVID-19 and is NOT indicative of infection with COVID-19.     Blood Gas, Arterial - [[id number]]  (Abnormal) Collected: 04/19/21 0915    Specimen: Arterial Blood Updated: 04/19/21 0920     Site Arterial Line     Jakub's Test Positive     pH, Arterial 7.393 pH units      pCO2, Arterial 50.5 mm Hg      Comment: 83 Value above reference range        pO2, Arterial 275.0 mm Hg      Comment: 83 Value above reference range        HCO3, Arterial 30.8 mmol/L      Comment: 83 Value above reference range        Base Excess, Arterial 4.3 mmol/L      Comment: 83 Value above reference range        O2 Saturation, Arterial >100.0 %      Comment: 93 Value above reportable range > 100.0        Barometric Pressure for Blood Gas 747 mmHg      Modality NRB     FIO2 100 %      Flow Rate 15.0 lpm      Ventilator Mode NA     Collected by RT     Comment: Meter: [id number]     :  [id number]       Extra Tubes [[id number]] Collected: 04/19/21 0812    Specimen: Blood, Venous Line Updated: 04/19/21 0915    Narrative:      The following orders were created for panel order Extra Tubes.  Procedure                               Abnormality         Status                     ---------                               -----------         ------                     Green Top (Gel)[[id number]]                                   Final result                 Please view results for these tests on the individual orders.    Green Top (Gel) [[id number]] Collected: 04/19/21 0812    Specimen: Blood Updated: 04/19/21 0915     Extra Tube Hold for add-ons.     Comment: Auto resulted.       Timed Lactic Acid, Reflex [[id number]]  (Normal) Collected: 04/19/21 0805    Specimen: Blood Updated: 04/19/21 0836     Lactate 1.0 mmol/L     CBC & Differential [[id number]]  (Abnormal) Collected: 04/19/21 0812    Specimen: Blood Updated: 04/19/21 0822    Narrative:      The following orders were created for panel order CBC & Differential.  Procedure                               Abnormality         Status                     ---------                               -----------         ------                     CBC Auto Differential[[id number]]        Abnormal            Final result                 Please view results for these tests on the individual orders.    CBC Auto Differential [[id number]]  (Abnormal) Collected: 04/19/21 0812    Specimen: Blood Updated: 04/19/21 0822     WBC 10.53 10*3/mm3      RBC 5.24 10*6/mm3      Hemoglobin 16.2 g/dL      Hematocrit 48.2 %      MCV 92.0 fL      MCH 30.9 pg      MCHC 33.6 g/dL      RDW 13.2 %      RDW-SD 44.8 fl      MPV 9.7 fL      Platelets 253 10*3/mm3      Neutrophil % 93.5 %      Lymphocyte % 4.4 %      Monocyte % 1.1 %      Eosinophil % 0.2 %      Basophil % 0.4 %      Immature Grans % 0.4 %      Neutrophils, Absolute 9.85 10*3/mm3      Lymphocytes, Absolute 0.46 10*3/mm3      Monocytes, Absolute 0.12 10*3/mm3      Eosinophils, Absolute 0.02 10*3/mm3      Basophils, Absolute 0.04 10*3/mm3      Immature Grans, Absolute 0.04 10*3/mm3      nRBC 0.0 /100 WBC     Extra Tubes [[id number]] Collected: 04/19/21 0507    Specimen: Blood, Venous Line Updated: 04/19/21 0615    Narrative:      The following orders were created for panel order Extra Tubes.  Procedure                                Abnormality         Status                     ---------                               -----------         ------                     [facility][[id number]]                                   Final result                 Please view results for these tests on the individual orders.    Gold Top - SST [[id number]] Collected: 04/19/21 0507    Specimen: Blood Updated: 04/19/21 0615     Extra Tube Hold for add-ons.     Comment: Auto resulted.       Comprehensive Metabolic Panel [[id number]]  (Abnormal) Collected: 04/19/21 0506    Specimen: Blood Updated: 04/19/21 0542     Glucose 145 mg/dL      BUN 10 mg/dL      Creatinine 0.78 mg/dL      Sodium 141 mmol/L      Potassium 4.2 mmol/L      Chloride 101 mmol/L      CO2 33.0 mmol/L      Calcium 9.3 mg/dL      Total Protein 7.6 g/dL      Albumin 4.30 g/dL      ALT (SGPT) 28 U/L      AST (SGOT) 24 U/L      Alkaline Phosphatase 98 U/L      Total Bilirubin 0.3 mg/dL      eGFR Non African Amer 102 mL/min/1.73      Globulin 3.3 gm/dL      A/G Ratio 1.3 g/dL      BUN/Creatinine Ratio 12.8     Anion Gap 7.0 mmol/L     Narrative:      GFR Normal >60  Chronic Kidney Disease <60  Kidney Failure <15      Magnesium [[id number]]  (Normal) Collected: 04/19/21 0506    Specimen: Blood Updated: 04/19/21 0542     Magnesium 2.2 mg/dL     Troponin [[id number]]  (Normal) Collected: 04/19/21 0506    Specimen: Blood Updated: 04/19/21 0541     Troponin T <0.010 ng/mL     Narrative:      Troponin T Reference Range:  <= 0.03 ng/mL-   Negative for AMI  >0.03 ng/mL-     Abnormal for myocardial necrosis.  Clinicians would have to utilize clinical acumen, EKG, Troponin and serial changes to determine if it is an Acute Myocardial Infarction or myocardial injury due to an underlying chronic condition.       Results may be falsely decreased if patient taking Biotin.      Lactic Acid, Plasma [[id number]]  (Abnormal) Collected: 04/19/21 0507    Specimen: Blood Updated: 04/19/21 0541     Lactate 3.7 mmol/L     BNP  [[id number]]  (Normal) Collected: 04/19/21 0506    Specimen: Blood Updated: 04/19/21 0540     proBNP 34.9 pg/mL     Narrative:      Among patients with dyspnea, NT-proBNP is highly sensitive for the detection of acute congestive heart failure. In addition NT-proBNP of <300 pg/ml effectively rules out acute congestive heart failure with 99% negative predictive value.    Results may be falsely decreased if patient taking Biotin.      COVID-19 and FLU A/B PCR - Swab, Nasopharynx [[id number]]  (Normal) Collected: 04/19/21 0510    Specimen: Swab from Nasopharynx Updated: 04/19/21 0539     COVID19 Not Detected     Influenza A PCR Not Detected     Influenza B PCR Not Detected    Narrative:      Fact sheet for providers: https://www.fda.gov/media/931582/download    Fact sheet for patients: https://www.fda.gov/media/571916/download    Test performed by PCR.    Blood Culture - Blood, Arm, Left [[id number]] Collected: 04/19/21 0532    Specimen: Blood from Arm, Left Updated: 04/19/21 0532    D-dimer, Quantitative [[id number]]  (Abnormal) Collected: 04/19/21 0506    Specimen: Blood Updated: 04/19/21 0532     D-Dimer, Quantitative 485 ng/mL (FEU)     Narrative:      Dimer values <500 ng/ml FEU are FDA approved as aid in diagnosis of deep venous thrombosis and pulmonary embolism.  This test should not be used in an exclusion strategy with pretest probability alone.    A recent guideline regarding diagnosis for pulmonary thromboembolism recommends an adjusted exclusion criterion of age x 10 ng/ml FEU for patients >50 years of age ([employer]ina Intern Med 2015; 163: 701-711).      Blood Gas, Arterial - [[id number]]  (Abnormal) Collected: 04/19/21 0509    Specimen: Arterial Blood Updated: 04/19/21 0518     Site Arterial Line     Jakub's Test Positive     pH, Arterial 7.268 pH units      Comment: 84 Value below reference range        pCO2, Arterial 65.4 mm Hg      Comment: 83 Value above reference range        pO2, Arterial 84.3 mm Hg      HCO3,  Arterial 29.9 mmol/L      Comment: 83 Value above reference range        Base Excess, Arterial 0.6 mmol/L      O2 Saturation, Arterial 95.0 %      Barometric Pressure for Blood Gas 746 mmHg      Modality Nasal Cannula     Flow Rate 5.0 lpm      Ventilator Mode NA     Collected by RT     Comment: Meter: [id number]     :  [id number]       CBC & Differential [[id number]]  (Abnormal) Collected: 04/19/21 0506    Specimen: Blood Updated: 04/19/21 0514    Narrative:      The following orders were created for panel order CBC & Differential.  Procedure                               Abnormality         Status                     ---------                               -----------         ------                     CBC Auto Differential[[id number]]        Abnormal            Final result                 Please view results for these tests on the individual orders.    CBC Auto Differential [[id number]]  (Abnormal) Collected: 04/19/21 0506    Specimen: Blood Updated: 04/19/21 0514     WBC 10.54 10*3/mm3      RBC 5.69 10*6/mm3      Hemoglobin 17.5 g/dL      Hematocrit 53.9 %      MCV 94.7 fL      MCH 30.8 pg      MCHC 32.5 g/dL      RDW 13.4 %      RDW-SD 47.0 fl      MPV 10.0 fL      Platelets 349 10*3/mm3      Neutrophil % 49.5 %      Lymphocyte % 31.6 %      Monocyte % 10.8 %      Eosinophil % 6.2 %      Basophil % 0.9 %      Immature Grans % 1.0 %      Neutrophils, Absolute 5.21 10*3/mm3      Lymphocytes, Absolute 3.33 10*3/mm3      Monocytes, Absolute 1.14 10*3/mm3      Eosinophils, Absolute 0.65 10*3/mm3      Basophils, Absolute 0.10 10*3/mm3      Immature Grans, Absolute 0.11 10*3/mm3      nRBC 0.0 /100 WBC     Blood Culture - Blood, Arm, Right [[id number]] Collected: 04/19/21 0507    Specimen: Blood from Arm, Right Updated: 04/19/21 0511           Imaging Results (Last 24 Hours)     Procedure Component Value Units Date/Time    XR Chest 1 View [[id number]] Collected: 04/19/21 0542     Updated: 04/19/21 0612     Narrative:      PORTABLE CHEST X-RAY    CLINICAL HISTORY: short of breath    COMPARISON: 5/6/2020.    FINDINGS: Portable AP view of the chest was obtained with  overlying monitor leads in place. ET and NG tube have been  removed. There is mild chronic elevation of the right diaphragm.  There is no active airspace disease, edema, or pleural fluid.  Stable cardiomegaly. There are extensive soft tissue  calcifications again noted over the bilateral thorax and lower  neck.      Impression:      No active disease      Electronically signed by:  Nayan Sweeney MD  4/19/2021 6:11 AM  CDT Workstation: [id number]            I reviewed the patient's new clinical results.    ASSESSMENT AND PLAN: This is a 60 y.o. male with:    Active Hospital Problems    Diagnosis  POA   • **Acute respiratory failure with hypercapnia (CMS/HCC) [J96.02]  Unknown     Desatted to 78% on room air in the emergency department    ED ABG 7.268 consistent with respiratory acidosis  PCO2 65 High  P02 is low normal at 84.3    -BNP within normal limits     -Daily ABG         • COPD exacerbation (CMS/HCC) [J44.1]  Yes       Now satting 99% on nonrebreather with 15 L/min oxygen and 35% oxygen concentration (4/19)    X-Ray (4/19) demonstrated - there is no active airspace disease, edema, or pleural fluid    -Received dounebs in the ED and will continue Q4H on admission     -Received IV methylprednisolone once in the emergency department on 4/19/2021 and we will continue a 5-day course of steroids going forward    -respiratory stain and culture    -respiratory pathogen panel    -atypicals     -follow up blood cultures     -Covid negative     -Lactate in ED 3.7    -started Long acting beta agonist Symbicort while in hospital     -Daily ABG, CBC, CMP    -continue home medicines    -continuous pulse oximetry        • Positive D dimer [R79.89]  Unknown     D-Dimer in     Elevated HR>100 --> Well's score of 1.5 points - low risk group     [place] Prediction  Score of 1 - Lovenox for DVT prophylaxis           DVT prophylaxis: Lovenox     Timmy Diop and I have discussed pain goals for this hospitalization after reviewing his current clinical condition, medical history and prior pain experiences.  The goal is to keep the pain level at a 2/10.  To help achieve this, I plan to prescribe NSAIDs (if medically appropriate) and consider opoid medications if needed.     ADELA # Reviewed using Epic PDMP  reviewed and consistent with patient reported medications.    Expected Length of Stay: 1-2 days     I discussed the patient's findings and my recommendations with patient.     Phil Lee MD is the attending on record at time of admission, He is aware of the patient's status and agrees with the above history and physical.      This document has been electronically signed by Nicholas Ryan MD on April 19, 2021 14:27 CDT      Part of this note may be an electronic transcription or translation of spoken language to printed text using the Dragon Dictation System.

## 2021-04-20 ENCOUNTER — READMISSION MANAGEMENT (OUTPATIENT)
Dept: CALL CENTER | Facility: HOSPITAL | Age: 61
End: 2021-04-20

## 2021-04-20 VITALS
BODY MASS INDEX: 27.91 KG/M2 | TEMPERATURE: 99 F | SYSTOLIC BLOOD PRESSURE: 149 MMHG | HEART RATE: 109 BPM | OXYGEN SATURATION: 95 % | DIASTOLIC BLOOD PRESSURE: 92 MMHG | RESPIRATION RATE: 18 BRPM | HEIGHT: 65 IN | WEIGHT: 167.5 LBS

## 2021-04-20 PROBLEM — J44.1 COPD EXACERBATION (HCC): Status: RESOLVED | Noted: 2021-04-19 | Resolved: 2021-04-20

## 2021-04-20 PROBLEM — J96.02 ACUTE RESPIRATORY FAILURE WITH HYPERCAPNIA (HCC): Status: RESOLVED | Noted: 2020-05-03 | Resolved: 2021-04-20

## 2021-04-20 PROBLEM — R79.89 POSITIVE D DIMER: Status: RESOLVED | Noted: 2021-04-19 | Resolved: 2021-04-20

## 2021-04-20 LAB
ALBUMIN SERPL-MCNC: 3.8 G/DL (ref 3.5–5.2)
ALBUMIN/GLOB SERPL: 1.3 G/DL
ALP SERPL-CCNC: 78 U/L (ref 39–117)
ALT SERPL W P-5'-P-CCNC: 23 U/L (ref 1–41)
ANION GAP SERPL CALCULATED.3IONS-SCNC: 5 MMOL/L (ref 5–15)
ARTERIAL PATENCY WRIST A: ABNORMAL
AST SERPL-CCNC: 21 U/L (ref 1–40)
ATMOSPHERIC PRESS: 747 MMHG
BASE EXCESS BLDA CALC-SCNC: 5.7 MMOL/L (ref 0–2)
BASOPHILS # BLD AUTO: 0.03 10*3/MM3 (ref 0–0.2)
BASOPHILS NFR BLD AUTO: 0.2 % (ref 0–1.5)
BDY SITE: ABNORMAL
BILIRUB SERPL-MCNC: 0.3 MG/DL (ref 0–1.2)
BUN SERPL-MCNC: 13 MG/DL (ref 6–20)
BUN/CREAT SERPL: 16.9 (ref 7–25)
CALCIUM SPEC-SCNC: 9.4 MG/DL (ref 8.6–10.5)
CHLORIDE SERPL-SCNC: 99 MMOL/L (ref 98–107)
CO2 SERPL-SCNC: 35 MMOL/L (ref 22–29)
CREAT SERPL-MCNC: 0.77 MG/DL (ref 0.76–1.27)
DEPRECATED RDW RBC AUTO: 45.8 FL (ref 37–54)
EOSINOPHIL # BLD AUTO: 0.02 10*3/MM3 (ref 0–0.4)
EOSINOPHIL NFR BLD AUTO: 0.2 % (ref 0.3–6.2)
ERYTHROCYTE [DISTWIDTH] IN BLOOD BY AUTOMATED COUNT: 13.2 % (ref 12.3–15.4)
GAS FLOW AIRWAY: 4 LPM
GFR SERPL CREATININE-BSD FRML MDRD: 103 ML/MIN/1.73
GLOBULIN UR ELPH-MCNC: 3 GM/DL
GLUCOSE SERPL-MCNC: 154 MG/DL (ref 65–99)
HCO3 BLDA-SCNC: 32.1 MMOL/L (ref 20–26)
HCT VFR BLD AUTO: 48.3 % (ref 37.5–51)
HGB BLD-MCNC: 15.8 G/DL (ref 13–17.7)
IMM GRANULOCYTES # BLD AUTO: 0.05 10*3/MM3 (ref 0–0.05)
IMM GRANULOCYTES NFR BLD AUTO: 0.4 % (ref 0–0.5)
LYMPHOCYTES # BLD AUTO: 1.35 10*3/MM3 (ref 0.7–3.1)
LYMPHOCYTES NFR BLD AUTO: 10.2 % (ref 19.6–45.3)
Lab: ABNORMAL
MCH RBC QN AUTO: 30.8 PG (ref 26.6–33)
MCHC RBC AUTO-ENTMCNC: 32.7 G/DL (ref 31.5–35.7)
MCV RBC AUTO: 94.2 FL (ref 79–97)
MODALITY: ABNORMAL
MONOCYTES # BLD AUTO: 1.06 10*3/MM3 (ref 0.1–0.9)
MONOCYTES NFR BLD AUTO: 8 % (ref 5–12)
NEUTROPHILS NFR BLD AUTO: 10.73 10*3/MM3 (ref 1.7–7)
NEUTROPHILS NFR BLD AUTO: 81 % (ref 42.7–76)
NRBC BLD AUTO-RTO: 0 /100 WBC (ref 0–0.2)
PCO2 BLDA: 52 MM HG (ref 35–45)
PH BLDA: 7.4 PH UNITS (ref 7.35–7.45)
PLATELET # BLD AUTO: 289 10*3/MM3 (ref 140–450)
PMV BLD AUTO: 10.5 FL (ref 6–12)
PO2 BLDA: 95.7 MM HG (ref 83–108)
POTASSIUM SERPL-SCNC: 4.3 MMOL/L (ref 3.5–5.2)
PROT SERPL-MCNC: 6.8 G/DL (ref 6–8.5)
RBC # BLD AUTO: 5.13 10*6/MM3 (ref 4.14–5.8)
SAO2 % BLDCOA: 97.9 % (ref 94–99)
SODIUM SERPL-SCNC: 139 MMOL/L (ref 136–145)
VENTILATOR MODE: ABNORMAL
WBC # BLD AUTO: 13.24 10*3/MM3 (ref 3.4–10.8)

## 2021-04-20 PROCEDURE — 82803 BLOOD GASES ANY COMBINATION: CPT

## 2021-04-20 PROCEDURE — 85025 COMPLETE CBC W/AUTO DIFF WBC: CPT | Performed by: STUDENT IN AN ORGANIZED HEALTH CARE EDUCATION/TRAINING PROGRAM

## 2021-04-20 PROCEDURE — 94799 UNLISTED PULMONARY SVC/PX: CPT

## 2021-04-20 PROCEDURE — 25010000002 ENOXAPARIN PER 10 MG: Performed by: STUDENT IN AN ORGANIZED HEALTH CARE EDUCATION/TRAINING PROGRAM

## 2021-04-20 PROCEDURE — 94660 CPAP INITIATION&MGMT: CPT

## 2021-04-20 PROCEDURE — 94760 N-INVAS EAR/PLS OXIMETRY 1: CPT

## 2021-04-20 PROCEDURE — 80053 COMPREHEN METABOLIC PANEL: CPT | Performed by: STUDENT IN AN ORGANIZED HEALTH CARE EDUCATION/TRAINING PROGRAM

## 2021-04-20 PROCEDURE — 63710000001 PREDNISONE PER 1 MG: Performed by: STUDENT IN AN ORGANIZED HEALTH CARE EDUCATION/TRAINING PROGRAM

## 2021-04-20 RX ORDER — BUDESONIDE AND FORMOTEROL FUMARATE DIHYDRATE 80; 4.5 UG/1; UG/1
2 AEROSOL RESPIRATORY (INHALATION)
Qty: 10.2 G | Refills: 12 | Status: SHIPPED | OUTPATIENT
Start: 2021-04-20 | End: 2021-10-29 | Stop reason: DRUGHIGH

## 2021-04-20 RX ORDER — PREDNISONE 20 MG/1
40 TABLET ORAL DAILY
Qty: 6 TABLET | Refills: 0 | Status: SHIPPED | OUTPATIENT
Start: 2021-04-21 | End: 2021-04-24

## 2021-04-20 RX ORDER — CHOLECALCIFEROL (VITAMIN D3) 125 MCG
5 CAPSULE ORAL NIGHTLY PRN
Qty: 30 EACH | Refills: 0 | Status: SHIPPED | OUTPATIENT
Start: 2021-04-20 | End: 2022-05-02

## 2021-04-20 RX ADMIN — BUDESONIDE AND FORMOTEROL FUMARATE DIHYDRATE 2 PUFF: 80; 4.5 AEROSOL RESPIRATORY (INHALATION) at 08:17

## 2021-04-20 RX ADMIN — IPRATROPIUM BROMIDE AND ALBUTEROL SULFATE 3 ML: 2.5; .5 SOLUTION RESPIRATORY (INHALATION) at 15:05

## 2021-04-20 RX ADMIN — IPRATROPIUM BROMIDE AND ALBUTEROL SULFATE 3 ML: 2.5; .5 SOLUTION RESPIRATORY (INHALATION) at 08:14

## 2021-04-20 RX ADMIN — IPRATROPIUM BROMIDE AND ALBUTEROL SULFATE 3 ML: 2.5; .5 SOLUTION RESPIRATORY (INHALATION) at 12:06

## 2021-04-20 RX ADMIN — PREDNISONE 40 MG: 20 TABLET ORAL at 08:08

## 2021-04-20 RX ADMIN — ENOXAPARIN SODIUM 40 MG: 40 INJECTION SUBCUTANEOUS at 08:09

## 2021-04-20 RX ADMIN — ENOXAPARIN SODIUM 40 MG: 40 INJECTION SUBCUTANEOUS at 08:42

## 2021-04-20 RX ADMIN — SODIUM CHLORIDE, PRESERVATIVE FREE 10 ML: 5 INJECTION INTRAVENOUS at 08:09

## 2021-04-20 NOTE — OUTREACH NOTE
Prep Survey      Responses   [facility] patient discharged from?  [place]   Is LACE score < 7 ?  Yes   Emergency Room discharge w/ pulse ox?  No   Eligibility  [facility]   Date of Admission  04/19/21   Date of Discharge  04/20/21   Discharge Disposition  Home or Self Care   Discharge diagnosis  COPD exacerbation    Does the patient have one of the following disease processes/diagnoses(primary or secondary)?  COPD/Pneumonia   Does the patient have Home health ordered?  No   Is there a DME ordered?  No   Prep survey completed?  Yes          Katt Auguste RN

## 2021-04-20 NOTE — PROGRESS NOTES
FAMILY MEDICINE DAILY PROGRESS NOTE    NAME: Timmy Diop  : 1960  MRN: [id number]      LOS: 1 day     PROVIDER OF SERVICE: Nicholas Ryan MD    Chief Complaint: Acute respiratory failure with hypercapnia (CMS/HCC)    Subjective:     Interval History:  History taken from: patient    Afebrile. VS stable. Satting 98% on 4L NC.     Patient has no complaints this morning. Shortness of breath has improved.  He continues to have bilateral expiratory wheezes on exam that are much improved since yesterday.  Has not had a breathing treatment since last night despite Q4H order.  Last DuoNebs was at 7:57 PM yesterday.    ABG pH has normalized today at 7.399 and hypercapnia is much improved at 52.    On day 2 of steroid five day course now with oral prednisone 40 mg    We will wean oxygen to 2 L now and have patient ambulate after his breathing treatment this morning.        Review of Systems:   Review of Systems   Respiratory: Positive for chest tightness (much improved), shortness of breath (much improved) and wheezing (much improved).        Objective:     Vital Signs  Temp:  [97.3 °F (36.3 °C)-98.6 °F (37 °C)] 97.3 °F (36.3 °C)  Heart Rate:  [] 88  Resp:  [18-22] 18  BP: (119-134)/(69-86) 119/81  Body mass index is 27.87 kg/m².    Physical Exam  Physical Exam  Constitutional:       General: He is not in acute distress.     Appearance: Normal appearance. He is not ill-appearing, toxic-appearing or diaphoretic.   HENT:      Head: Normocephalic and atraumatic.   Cardiovascular:      Rate and Rhythm: Normal rate and regular rhythm.   Pulmonary:      Effort: Pulmonary effort is normal. No respiratory distress.      Breath sounds: Wheezing (Bilateral expiratory wheeze in all lung fields, much improved since yesterday) present. No rhonchi or rales.   Abdominal:      General: There is distension.      Tenderness: There is no abdominal tenderness.   Musculoskeletal:      Cervical back: Normal range of motion.    Neurological:      Mental Status: He is alert.         Medication Review    Current Facility-Administered Medications:   •  acetaminophen (TYLENOL) tablet 650 mg, 650 mg, Oral, Q4H PRN, Nicholas Ryan MD  •  albuterol (PROVENTIL) nebulizer solution 0.083% 2.5 mg/3mL, 2.5 mg, Nebulization, Q6H PRN, Nicholas Ryan MD  •  budesonide-formoterol (SYMBICORT) 80-4.5 MCG/ACT inhaler 2 puff, 2 puff, Inhalation, BID - RT, Nicholas Ryan MD, 2 puff at 04/19/21 1957  •  enoxaparin (LOVENOX) syringe 40 mg, 40 mg, Subcutaneous, Q24H, Nicholas Ryan MD, 40 mg at 04/19/21 0935  •  ipratropium-albuterol (DUO-NEB) nebulizer solution 3 mL, 3 mL, Nebulization, 4x Daily - RT, Nicholas Ryan MD, 3 mL at 04/19/21 1957  •  ketorolac (TORADOL) injection 30 mg, 30 mg, Intravenous, Q6H PRN, Nicholas Ryan MD  •  melatonin tablet 5.25 mg, 5.25 mg, Oral, Nightly PRN, Nicholas Ryan MD  •  ondansetron (ZOFRAN) tablet 4 mg, 4 mg, Oral, Q6H PRN, Nicholas Ryan MD  •  predniSONE (DELTASONE) tablet 40 mg, 40 mg, Oral, Daily, Nicholas Ryan MD  •  [COMPLETED] Insert peripheral IV, , , Once **AND** sodium chloride 0.9 % flush 10 mL, 10 mL, Intravenous, PRN, Gunnar Eddy DO  •  sodium chloride 0.9 % flush 10 mL, 10 mL, Intravenous, Q12H, Nicholas Ryan MD  •  sodium chloride 0.9 % flush 10 mL, 10 mL, Intravenous, PRN, Nicholas Ryan MD     Diagnostic Data    Lab Results (last 24 hours)     Procedure Component Value Units Date/Time    Comprehensive Metabolic Panel [[id number]]  (Abnormal) Collected: 04/20/21 0545    Specimen: Blood Updated: 04/20/21 0705     Glucose 154 mg/dL      BUN 13 mg/dL      Creatinine 0.77 mg/dL      Sodium 139 mmol/L      Potassium 4.3 mmol/L      Comment: Slight hemolysis detected by analyzer. Results may be affected.        Chloride 99 mmol/L      CO2 35.0 mmol/L      Calcium 9.4 mg/dL      Total Protein 6.8 g/dL      Albumin 3.80 g/dL      ALT (SGPT) 23 U/L      AST (SGOT) 21 U/L      Alkaline  Phosphatase 78 U/L      Total Bilirubin 0.3 mg/dL      eGFR Non African Amer 103 mL/min/1.73      Globulin 3.0 gm/dL      A/G Ratio 1.3 g/dL      BUN/Creatinine Ratio 16.9     Anion Gap 5.0 mmol/L     Narrative:      GFR Normal >60  Chronic Kidney Disease <60  Kidney Failure <15      CBC & Differential [[id number]]  (Abnormal) Collected: 04/20/21 0545    Specimen: Blood Updated: 04/20/21 0633    Narrative:      The following orders were created for panel order CBC & Differential.  Procedure                               Abnormality         Status                     ---------                               -----------         ------                     CBC Auto Differential[[id number]]        Abnormal            Final result                 Please view results for these tests on the individual orders.    CBC Auto Differential [[id number]]  (Abnormal) Collected: 04/20/21 0545    Specimen: Blood Updated: 04/20/21 0633     WBC 13.24 10*3/mm3      RBC 5.13 10*6/mm3      Hemoglobin 15.8 g/dL      Hematocrit 48.3 %      MCV 94.2 fL      MCH 30.8 pg      MCHC 32.7 g/dL      RDW 13.2 %      RDW-SD 45.8 fl      MPV 10.5 fL      Platelets 289 10*3/mm3      Neutrophil % 81.0 %      Lymphocyte % 10.2 %      Monocyte % 8.0 %      Eosinophil % 0.2 %      Basophil % 0.2 %      Immature Grans % 0.4 %      Neutrophils, Absolute 10.73 10*3/mm3      Lymphocytes, Absolute 1.35 10*3/mm3      Monocytes, Absolute 1.06 10*3/mm3      Eosinophils, Absolute 0.02 10*3/mm3      Basophils, Absolute 0.03 10*3/mm3      Immature Grans, Absolute 0.05 10*3/mm3      nRBC 0.0 /100 WBC     Blood Culture - Blood, Arm, Left [[id number]] Collected: 04/19/21 0532    Specimen: Blood from Arm, Left Updated: 04/20/21 0545     Blood Culture No growth at 24 hours    Blood Culture - Blood, Arm, Right [[id number]] Collected: 04/19/21 0507    Specimen: Blood from Arm, Right Updated: 04/20/21 0515     Blood Culture No growth at 24 hours    Blood Gas, Arterial -  [[id number]]  (Abnormal) Collected: 04/20/21 0505    Specimen: Arterial Blood Updated: 04/20/21 0511     Site Arterial Line     Jakub's Test N/A     pH, Arterial 7.399 pH units      pCO2, Arterial 52.0 mm Hg      Comment: 83 Value above reference range        pO2, Arterial 95.7 mm Hg      HCO3, Arterial 32.1 mmol/L      Comment: 83 Value above reference range        Base Excess, Arterial 5.7 mmol/L      Comment: 83 Value above reference range        O2 Saturation, Arterial 97.9 %      Barometric Pressure for Blood Gas 747 mmHg      Modality Nasal Cannula     Flow Rate 4.0 lpm      Ventilator Mode NA     Collected by RT     Comment: Meter: [id number]     :  [id number]       Respiratory Culture - Sputum, Cough [[id number]] Collected: 04/19/21 1144    Specimen: Sputum from Cough Updated: 04/19/21 1424     Gram Stain Rare (1+) Squamous epithelial cells      Moderate (3+) WBCs per low power field      Mixed bacterial paresh            I reviewed the patient's new clinical results.    Assessment/Plan:     Active Hospital Problems    Diagnosis  POA   • **Acute respiratory failure with hypercapnia (CMS/HCC) [J96.02]  Unknown     Desatted to 78% on room air in the emergency department    ED ABG 7.268 consistent with respiratory acidosis  PCO2 65 High  P02 is low normal at 84.3    On 4/20/2021 ABG pH has normalized  PCO2 improved to 52    -BNP within normal limits     -Daily ABG         • COPD exacerbation (CMS/HCC) [J44.1]  Yes     Now satting 99% on 4 L nasal cannula (4/20)    X-Ray (4/19) demonstrated - there is no active airspace disease, edema, or pleural fluid    -Received dounebs in the ED and will continue Q4H on admission     -Received IV methylprednisolone once in the emergency department on 4/19/2021 and now on day 2 of 5-day course of steroids with oral prednisone 40 mg    -respiratory pathogen panel negative    -Strep pneumo negative, other atypicals pending      -blood cultures show no growth at 24  hours    -Covid negative     -Lactate in ED 3.7    -started Long acting beta agonist Symbicort while in hospital     -Daily ABG, CBC, CMP    -continue home medicines    -continuous pulse oximetry        • Positive D dimer [R79.89]  Unknown     D-Dimer in     Elevated HR>100 --> Well's score of 1.5 points - low risk group     Padua Prediction Score of 1 - Lovenox for DVT prophylaxis           DVT prophylaxis: Lovenox  Code status is   Code Status and Medical Interventions:   Ordered at: 04/19/21 0803     Level Of Support Discussed With:    Patient     Code Status:    CPR     Medical Interventions (Level of Support Prior to Arrest):    Full       Plan for disposition:To place of residence in 1-2 days       Time: 15 min         This document has been electronically signed by Nicholas Ryan MD on April 20, 2021 07:22 CDT    Part of the note may be an electronic transcription or translation of spoken language to printed text using the Dragon Dictation System

## 2021-04-20 NOTE — PLAN OF CARE
Goal Outcome Evaluation:  Plan of Care Reviewed With: patient  Progress: improving  Outcome Summary: pt ambulated in keen on room air and was able to maintain o2 at 93%. no s/s of distress.

## 2021-04-20 NOTE — DISCHARGE SUMMARY
DISCHARGE SUMMARY    PATIENT NAME: Timmy Diop       PHYSICIAN: Phil Lee MD  : 1960  MRN: [id number]    ADMITTED: 2021     DISCHARGED: 2020    ADMISSION DIAGNOSES:  Active Hospital Problems   No active problems to display.      Resolved Hospital Problems    Diagnosis Date Resolved POA   • **Acute respiratory failure with hypercapnia (CMS/HCC) [J96.02] 2021 Unknown   • COPD exacerbation (CMS/HCC) [J44.1] 2021 Yes   • Positive D dimer [R79.89] 2021 Unknown       DISCHARGE DIAGNOSES:   Active Hospital Problems   No active problems to display.      Resolved Hospital Problems    Diagnosis Date Resolved POA   • **Acute respiratory failure with hypercapnia (CMS/HCC) [J96.02] 2021 Unknown   • COPD exacerbation (CMS/HCC) [J44.1] 2021 Yes   • Positive D dimer [R79.89] 2021 Unknown       SERVICE: Family [facility] Res[facility]  Attending: Phil Lee MD  Resident: Nicholas Ryan MD    CONSULTS:   Consult Orders (all) (From admission, onward)    None          PROCEDURES:   None    HISTORY OF PRESENT ILLNESS:   Retrieved from history and physical exam by Nicholas Ryan on 2020:    Timmy Diop is a 60 y.o. otherwise healthy male with a CMH of COPD who presents complaining of wheezing and shortness of breath.  He reports no increase in sputum production does have a dry, hacking cough.     The patient says that he woke up this morning with chest tightness, shortness of breath, and difficulty speaking.  He used a home nebulizer treatment that failed to improve his symptoms.  He then used his albuterol rescue inhaler and Spiriva inhaler with no relief.     Mr. Diop does not know whether his COPD is related to emphysema or chronic bronchitis.  He states that he does not think he has ever had a pulmonary function test.     Mr. Diop does not require home oxygen. He was last hospitalized for COPD exacerbation in  of last year. He required  intubation at that time and since follows with Dr. Arvin Ness as his PCP.  He has never smoked but has a wife who is smoked and says that he believes his COPD is the result of years of secondhand smoke.     The patient reports no sick contacts and no seasonal allergies.     He presented to the emergency department and had respiratory acidosis on initial ABG.  He desatted to the 70s on room air and was given IV methylprednisolone and a DuoNeb treatment x1 and started on a nonrebreather mask at 15 L and 35% oxygen and his oxygen saturation improved to 99%.     Of note, he was diagnosed with dermatomyositis as a child and still has subcutaneous calcifications in the armpits and across his lower abdomen and groin.  He states that he has never had pulmonary function testing.  He had a possible reaction to an antibiotic after his rotator cuff surgery.       DIAGNOSTIC DATA:   Lab Results (last 24 hours)     Procedure Component Value Units Date/Time    Respiratory Culture - Sputum, Cough [[id number]] Collected: 04/19/21 1144    Specimen: Sputum from Cough Updated: 04/20/21 0931     Respiratory Culture Moderate growth (3+) Normal Respiratory Paresh: NO S.aureus/MRSA or Pseudomonas aeruginosa     Gram Stain Rare (1+) Squamous epithelial cells      Moderate (3+) WBCs per low power field      Mixed bacterial paresh    Comprehensive Metabolic Panel [[id number]]  (Abnormal) Collected: 04/20/21 0545    Specimen: Blood Updated: 04/20/21 0705     Glucose 154 mg/dL      BUN 13 mg/dL      Creatinine 0.77 mg/dL      Sodium 139 mmol/L      Potassium 4.3 mmol/L      Comment: Slight hemolysis detected by analyzer. Results may be affected.        Chloride 99 mmol/L      CO2 35.0 mmol/L      Calcium 9.4 mg/dL      Total Protein 6.8 g/dL      Albumin 3.80 g/dL      ALT (SGPT) 23 U/L      AST (SGOT) 21 U/L      Alkaline Phosphatase 78 U/L      Total Bilirubin 0.3 mg/dL      eGFR Non African Amer 103 mL/min/1.73      Globulin 3.0 gm/dL      A/G  Ratio 1.3 g/dL      BUN/Creatinine Ratio 16.9     Anion Gap 5.0 mmol/L     Narrative:      GFR Normal >60  Chronic Kidney Disease <60  Kidney Failure <15      CBC & Differential [[id number]]  (Abnormal) Collected: 04/20/21 0545    Specimen: Blood Updated: 04/20/21 0633    Narrative:      The following orders were created for panel order CBC & Differential.  Procedure                               Abnormality         Status                     ---------                               -----------         ------                     CBC Auto Differential[[id number]]        Abnormal            Final result                 Please view results for these tests on the individual orders.    CBC Auto Differential [[id number]]  (Abnormal) Collected: 04/20/21 0545    Specimen: Blood Updated: 04/20/21 0633     WBC 13.24 10*3/mm3      RBC 5.13 10*6/mm3      Hemoglobin 15.8 g/dL      Hematocrit 48.3 %      MCV 94.2 fL      MCH 30.8 pg      MCHC 32.7 g/dL      RDW 13.2 %      RDW-SD 45.8 fl      MPV 10.5 fL      Platelets 289 10*3/mm3      Neutrophil % 81.0 %      Lymphocyte % 10.2 %      Monocyte % 8.0 %      Eosinophil % 0.2 %      Basophil % 0.2 %      Immature Grans % 0.4 %      Neutrophils, Absolute 10.73 10*3/mm3      Lymphocytes, Absolute 1.35 10*3/mm3      Monocytes, Absolute 1.06 10*3/mm3      Eosinophils, Absolute 0.02 10*3/mm3      Basophils, Absolute 0.03 10*3/mm3      Immature Grans, Absolute 0.05 10*3/mm3      nRBC 0.0 /100 WBC     Blood Culture - Blood, Arm, Left [[id number]] Collected: 04/19/21 0532    Specimen: Blood from Arm, Left Updated: 04/20/21 0545     Blood Culture No growth at 24 hours    Blood Culture - Blood, Arm, Right [[id number]] Collected: 04/19/21 0507    Specimen: Blood from Arm, Right Updated: 04/20/21 0515     Blood Culture No growth at 24 hours    Blood Gas, Arterial - [[id number]]  (Abnormal) Collected: 04/20/21 0505    Specimen: Arterial Blood Updated: 04/20/21 0511     Site Arterial Line     Jakub's  Test N/A     pH, Arterial 7.399 pH units      pCO2, Arterial 52.0 mm Hg      Comment: 83 Value above reference range        pO2, Arterial 95.7 mm Hg      HCO3, Arterial 32.1 mmol/L      Comment: 83 Value above reference range        Base Excess, Arterial 5.7 mmol/L      Comment: 83 Value above reference range        O2 Saturation, Arterial 97.9 %      Barometric Pressure for Blood Gas 747 mmHg      Modality Nasal Cannula     Flow Rate 4.0 lpm      Ventilator Mode NA     Collected by RT     Comment: Meter: [id number]     :  [id number]           Imaging Results (Last 72 Hours)     Procedure Component Value Units Date/Time    XR Chest 1 View [[id number]] Collected: 04/19/21 0542     Updated: 04/19/21 0612    Narrative:      PORTABLE CHEST X-RAY    CLINICAL HISTORY: short of breath    COMPARISON: 5/6/2020.    FINDINGS: Portable AP view of the chest was obtained with  overlying monitor leads in place. ET and NG tube have been  removed. There is mild chronic elevation of the right diaphragm.  There is no active airspace disease, edema, or pleural fluid.  Stable cardiomegaly. There are extensive soft tissue  calcifications again noted over the bilateral thorax and lower  neck.      Impression:      No active disease      Electronically signed by:  Nayan Sweeney MD  4/19/2021 6:11 AM  CDT Workstation: [id number]             HOSPITAL COURSE:    1. Acute respiratory failure with hypercapnia  Patient desatted to 78% on room air the emergency department.  He is not on oxygen at baseline.  His ABG was consistent with respiratory acidosis with elevated PCO2.  X-ray demonstrated no active airspace disease, edema, or pleural fluid.  He received IV methylprednisolone and course of duo nebs in the emergency department. He was admitted to the hospital and started on a nonrebreather with DuoNeb scheduled every 4 hours.  He was then transitioned to oral prednisone to complete a 5-day course.  His symptoms improved, his  "oxygen was weaned, and he was able to ambulate on room air without significant desaturation.  He was discharged home in stable condition on 4/20/2021 and scheduled to follow-up with his outpatient provider Dr. Arvin Ness within 1 week of admission.    2. COPD Exacerbation   The patient was not started on antibiotics as he has had no increased secretions.  However, due to his history of being intubated for COPD exacerbation within 1 year and his history of having to use a shower to \"open him up\" at baseline, it was felt that his COPD could be better controlled.  Thus, he was started on a long-acting beta agonist Symbicort while inpatient to be continued after discharge.  He should also be encouraged to receive luminary function test outpatient, as he reported during admission he has never had these.      DISCHARGE CONDITION:   Stable     DISPOSITION:  Home or Self Care    DISCHARGE MEDICATIONS     Discharge Medications      New Medications      Instructions Start Date   budesonide-formoterol 80-4.5 MCG/ACT inhaler  Commonly known as: SYMBICORT   2 puffs, Inhalation, 2 Times Daily - RT      melatonin 5 MG tablet tablet   5 mg, Oral, Nightly PRN      predniSONE 20 MG tablet  Commonly known as: DELTASONE   40 mg, Oral, Daily   Start Date: April 21, 2021        Continue These Medications      Instructions Start Date   albuterol 1.25 MG/3ML nebulizer solution  Commonly known as: ACCUNEB   1.25 mg, Nebulization, Every 6 Hours PRN      albuterol sulfate  (90 Base) MCG/ACT inhaler  Commonly known as: PROVENTIL HFA;VENTOLIN HFA;PROAIR HFA   2 puffs, Inhalation, Every 6 Hours PRN      aspirin 325 MG tablet   325 mg, Oral, As Needed      Spiriva Respimat 2.5 MCG/ACT aerosol solution inhaler  Generic drug: tiotropium bromide monohydrate   2 puffs, Inhalation, Daily - RT             INSTRUCTIONS:  Activity:   Activity Instructions     Activity as Tolerated          Diet:   Diet Instructions     Diet: Regular      " Discharge Diet: Regular          FOLLOW UP:   Additional Instructions for the Follow-ups that You Need to Schedule     Call MD With Problems / Concerns   As directed      Instructions:   Wheezing, shortness of breath, fever, chills    Order Comments: Instructions: Wheezing, shortness of breath, fever, chills          Discharge Follow-up with PCP   As directed       Currently Documented PCP:    Arvin Ness MD    PCP Phone Number:    [phone]     Follow Up Details: 1 week           Follow-up Information     rAvin Ness MD Follow up.    Specialties: Family Medicine, Emergency Medicine  Why: 1 week;If his office has not called by Thursday with your follow up time;please call them to schedule  Contact information:  [address]   [place] [address]  [phone]                   PENDING TEST RESULTS AT DISCHARGE  Pending Labs     Order Current Status    Blood Culture - Blood, Arm, Left Preliminary result    Blood Culture - Blood, Arm, Right Preliminary result    Respiratory Culture - Sputum, Cough Preliminary result          Time: 35 minutes was spent in discharge planning, medication reconciliation and coordination of care for this patient.    Phil Lee MD is the attending at time of discharge, He is aware of the patient's status and agrees with the above discharge summary.      This document has been electronically signed by Nicholas Ryan MD on April 20, 2021 18:14 CDT    Part of this note may be an electronic transcription or translation of spoken language to printed text using the Dragon Dictation System

## 2021-04-21 ENCOUNTER — TRANSITIONAL CARE MANAGEMENT TELEPHONE ENCOUNTER (OUTPATIENT)
Dept: CALL CENTER | Facility: HOSPITAL | Age: 61
End: 2021-04-21

## 2021-04-21 LAB
BACTERIA SPEC RESP CULT: NORMAL
GRAM STN SPEC: NORMAL

## 2021-04-21 NOTE — OUTREACH NOTE
"Call Center TCM Note      Responses   [facility] patient discharged from?  [place]   Does the patient have one of the following disease processes/diagnoses(primary or secondary)?  COPD/Pneumonia   TCM attempt successful?  Yes [Kaylan Garnett and Vadim on verbal release ]   Call start time  1011   Call end time  1015   Discharge diagnosis  COPD exacerbation    Meds reviewed with patient/caregiver?  Yes   Is the patient having any side effects they believe may be caused by any medication additions or changes?  No   Does the patient have all medications ordered at discharge?  Yes [patient states he is on the way to pick those up now ]   Is the patient taking all medications as directed (includes completed medication regime)?  Yes   Does the patient have a primary care provider?   Yes   Does the patient have an appointment with their PCP or specialist within 7 days of discharge?  Yes   Comments regarding PCP  hosp d/c f/u apt on 4-27-21 4:00    Has home health visited the patient within 72 hours of discharge?  N/A   Pulse Ox monitoring  None   Psychosocial issues?  No   Did the patient receive a copy of their discharge instructions?  Yes   Nursing interventions  Reviewed instructions with patient   What is the patient's perception of their health status since discharge?  Improving   Nursing Interventions  Nurse provided patient education   Are the patient's immunizations up to date?   No [States he will check in with PCP about getting \"these shots\" ]   Nursing interventions  Educated on importance of maintaining up to date immunizations as advised by provider   If the patient is a current smoker, are they able to teach back resources for cessation?  Not a smoker   Is the patient/caregiver able to teach back the hierarchy of who to call/visit for symptoms/problems? PCP, Specialist, Home health nurse, Urgent Care, ED, 911  Yes   Is the patient/caregiver able to teach back signs and symptoms of worsening " condition:  Fever/chills, Shortness of breath, Chest pain   Is the patient/caregiver able to teach back importance of completing antibiotic course of treatment?  Yes   TCM call completed?  Yes   Wrap up additional comments  Patient states he recieved excellent care from all staff at the hospital. He is feeling much better.           Sofia Bray RN    4/21/2021, 10:17 CDT

## 2021-04-21 NOTE — PAYOR COMM NOTE
"Reva Cid   [facility]  phone [phone]  fax [phone]    Auth# [id number]    Margarito Diop (60 y.o. Male)     Date of Birth Social Security Number Address Home Phone MRN    1960  [address] [facility] [id number] [phone] [id number]    [place] Marital Status          Restorationist        Admission Date Admission Type Admitting Provider Attending Provider Department, Room/Bed    21 Emergency Phil Lee MD  [facility] [address] [place], 322/1    Discharge Date Discharge Disposition Discharge Destination        2021 Home or Self Care              Attending Provider: (none)   Allergies: Shellfish-derived Products    Isolation: None   Infection: None   Code Status: Prior    Ht: 165.1 cm (65\")   Wt: 76 kg (167 lb 8 oz)    Admission Cmt: None   Principal Problem: Acute respiratory failure with hypercapnia (CMS/HCC) [J96.02] More...                 Active Insurance as of 2021     Primary Coverage     Payor Plan Insurance Group Employer/Plan Group    [employer] PPO [id number]     Payor Plan Address Payor Plan Phone Number Payor Plan Fax Number Effective Dates    [address] [phone]  2020 - None Entered    [place] [address]       Subscriber Name Subscriber Birth Date Member ID       MARGARITO DIOP 1960 [id number]                 Emergency Contacts      (Rel.) Home Phone Work Phone Mobile Phone    Mariola Diop (Spouse) [phone] -- [phone]               Discharge Summary      Nicholas Ryan MD at 21 1712              DISCHARGE SUMMARY    PATIENT NAME: Margarito Hyman Jadon       PHYSICIAN: Phil Lee MD  : 1960  MRN: [id number]    ADMITTED: 2021     DISCHARGED: 2020    ADMISSION DIAGNOSES:  Active Hospital Problems   No active problems to display.      Resolved Hospital Problems    Diagnosis Date Resolved POA   • **Acute respiratory " failure with hypercapnia (CMS/HCC) [J96.02] 04/20/2021 Unknown   • COPD exacerbation (CMS/HCC) [J44.1] 04/20/2021 Yes   • Positive D dimer [R79.89] 04/20/2021 Unknown       DISCHARGE DIAGNOSES:   Active Hospital Problems   No active problems to display.      Resolved Hospital Problems    Diagnosis Date Resolved POA   • **Acute respiratory failure with hypercapnia (CMS/HCC) [J96.02] 04/20/2021 Unknown   • COPD exacerbation (CMS/HCC) [J44.1] 04/20/2021 Yes   • Positive D dimer [R79.89] 04/20/2021 Unknown       SERVICE: Family Medicine Residency  Attending: Phil Lee MD  Resident: Nicholas Ryan MD    CONSULTS:   Consult Orders (all) (From admission, onward)    None          PROCEDURES:   None    HISTORY OF PRESENT ILLNESS:   Retrieved from history and physical exam by Nicholas Ryan on 4/19/2020:    Timmy Diop is a 60 y.o. otherwise healthy male with a CMH of COPD who presents complaining of wheezing and shortness of breath.  He reports no increase in sputum production does have a dry, hacking cough.     The patient says that he woke up this morning with chest tightness, shortness of breath, and difficulty speaking.  He used a home nebulizer treatment that failed to improve his symptoms.  He then used his albuterol rescue inhaler and Spiriva inhaler with no relief.     Mr. Diop does not know whether his COPD is related to emphysema or chronic bronchitis.  He states that he does not think he has ever had a pulmonary function test.     Mr. Diop does not require home oxygen. He was last hospitalized for COPD exacerbation in June of last year. He required intubation at that time and since follows with Dr. Arvin Ness as his PCP.  He has never smoked but has a wife who is smoked and says that he believes his COPD is the result of years of secondhand smoke.     The patient reports no sick contacts and no seasonal allergies.     He presented to the emergency department and had respiratory acidosis on  initial ABG.  He desatted to the 70s on room air and was given IV methylprednisolone and a DuoNeb treatment x1 and started on a nonrebreather mask at 15 L and 35% oxygen and his oxygen saturation improved to 99%.     Of note, he was diagnosed with dermatomyositis as a child and still has subcutaneous calcifications in the armpits and across his lower abdomen and groin.  He states that he has never had pulmonary function testing.  He had a possible reaction to an antibiotic after his rotator cuff surgery.       DIAGNOSTIC DATA:   Lab Results (last 24 hours)     Procedure Component Value Units Date/Time    Respiratory Culture - Sputum, Cough [[id number]] Collected: 04/19/21 1144    Specimen: Sputum from Cough Updated: 04/20/21 0931     Respiratory Culture Moderate growth (3+) Normal Respiratory Paresh: NO S.aureus/MRSA or Pseudomonas aeruginosa     Gram Stain Rare (1+) Squamous epithelial cells      Moderate (3+) WBCs per low power field      Mixed bacterial paresh    Comprehensive Metabolic Panel [[id number]]  (Abnormal) Collected: 04/20/21 0545    Specimen: Blood Updated: 04/20/21 0705     Glucose 154 mg/dL      BUN 13 mg/dL      Creatinine 0.77 mg/dL      Sodium 139 mmol/L      Potassium 4.3 mmol/L      Comment: Slight hemolysis detected by analyzer. Results may be affected.        Chloride 99 mmol/L      CO2 35.0 mmol/L      Calcium 9.4 mg/dL      Total Protein 6.8 g/dL      Albumin 3.80 g/dL      ALT (SGPT) 23 U/L      AST (SGOT) 21 U/L      Alkaline Phosphatase 78 U/L      Total Bilirubin 0.3 mg/dL      eGFR Non African Amer 103 mL/min/1.73      Globulin 3.0 gm/dL      A/G Ratio 1.3 g/dL      BUN/Creatinine Ratio 16.9     Anion Gap 5.0 mmol/L     Narrative:      GFR Normal >60  Chronic Kidney Disease <60  Kidney Failure <15      CBC & Differential [[id number]]  (Abnormal) Collected: 04/20/21 0545    Specimen: Blood Updated: 04/20/21 0633    Narrative:      The following orders were created for panel order CBC &  Differential.  Procedure                               Abnormality         Status                     ---------                               -----------         ------                     CBC Auto Differential[[id number]]        Abnormal            Final result                 Please view results for these tests on the individual orders.    CBC Auto Differential [[id number]]  (Abnormal) Collected: 04/20/21 0545    Specimen: Blood Updated: 04/20/21 0633     WBC 13.24 10*3/mm3      RBC 5.13 10*6/mm3      Hemoglobin 15.8 g/dL      Hematocrit 48.3 %      MCV 94.2 fL      MCH 30.8 pg      MCHC 32.7 g/dL      RDW 13.2 %      RDW-SD 45.8 fl      MPV 10.5 fL      Platelets 289 10*3/mm3      Neutrophil % 81.0 %      Lymphocyte % 10.2 %      Monocyte % 8.0 %      Eosinophil % 0.2 %      Basophil % 0.2 %      Immature Grans % 0.4 %      Neutrophils, Absolute 10.73 10*3/mm3      Lymphocytes, Absolute 1.35 10*3/mm3      Monocytes, Absolute 1.06 10*3/mm3      Eosinophils, Absolute 0.02 10*3/mm3      Basophils, Absolute 0.03 10*3/mm3      Immature Grans, Absolute 0.05 10*3/mm3      nRBC 0.0 /100 WBC     Blood Culture - Blood, Arm, Left [[id number]] Collected: 04/19/21 0532    Specimen: Blood from Arm, Left Updated: 04/20/21 0545     Blood Culture No growth at 24 hours    Blood Culture - Blood, Arm, Right [[id number]] Collected: 04/19/21 0507    Specimen: Blood from Arm, Right Updated: 04/20/21 0515     Blood Culture No growth at 24 hours    Blood Gas, Arterial - [[id number]]  (Abnormal) Collected: 04/20/21 0505    Specimen: Arterial Blood Updated: 04/20/21 0511     Site Arterial Line     Jakub's Test N/A     pH, Arterial 7.399 pH units      pCO2, Arterial 52.0 mm Hg      Comment: 83 Value above reference range        pO2, Arterial 95.7 mm Hg      HCO3, Arterial 32.1 mmol/L      Comment: 83 Value above reference range        Base Excess, Arterial 5.7 mmol/L      Comment: 83 Value above reference range        O2 Saturation, Arterial  97.9 %      Barometric Pressure for Blood Gas 747 mmHg      Modality Nasal Cannula     Flow Rate 4.0 lpm      Ventilator Mode NA     Collected by RT     Comment: Meter: [id number]     :  [id number]           Imaging Results (Last 72 Hours)     Procedure Component Value Units Date/Time    XR Chest 1 View [[id number]] Collected: 04/19/21 0542     Updated: 04/19/21 0612    Narrative:      PORTABLE CHEST X-RAY    CLINICAL HISTORY: short of breath    COMPARISON: 5/6/2020.    FINDINGS: Portable AP view of the chest was obtained with  overlying monitor leads in place. ET and NG tube have been  removed. There is mild chronic elevation of the right diaphragm.  There is no active airspace disease, edema, or pleural fluid.  Stable cardiomegaly. There are extensive soft tissue  calcifications again noted over the bilateral thorax and lower  neck.      Impression:      No active disease      Electronically signed by:  Nayan Sweeney MD  4/19/2021 6:11 AM  CDT Workstation: [id number]             HOSPITAL COURSE:    1. Acute respiratory failure with hypercapnia  Patient desatted to 78% on room air the emergency department.  He is not on oxygen at baseline.  His ABG was consistent with respiratory acidosis with elevated PCO2.  X-ray demonstrated no active airspace disease, edema, or pleural fluid.  He received IV methylprednisolone and course of duo nebs in the emergency department. He was admitted to the hospital and started on a nonrebreather with DuoNeb scheduled every 4 hours.  He was then transitioned to oral prednisone to complete a 5-day course.  His symptoms improved, his oxygen was weaned, and he was able to ambulate on room air without significant desaturation.  He was discharged home in stable condition on 4/20/2021 and scheduled to follow-up with his outpatient provider Dr. Arvin Ness within 1 week of admission.    2. COPD Exacerbation   The patient was not started on antibiotics as he has had no increased  "secretions.  However, due to his history of being intubated for COPD exacerbation within 1 year and his history of having to use a shower to \"open him up\" at baseline, it was felt that his COPD could be better controlled.  Thus, he was started on a long-acting beta agonist Symbicort while inpatient to be continued after discharge.  He should also be encouraged to receive luminary function test outpatient, as he reported during admission he has never had these.      DISCHARGE CONDITION:   Stable     DISPOSITION:  Home or Self Care    DISCHARGE MEDICATIONS     Discharge Medications      New Medications      Instructions Start Date   budesonide-formoterol 80-4.5 MCG/ACT inhaler  Commonly known as: SYMBICORT   2 puffs, Inhalation, 2 Times Daily - RT      melatonin 5 MG tablet tablet   5 mg, Oral, Nightly PRN      predniSONE 20 MG tablet  Commonly known as: DELTASONE   40 mg, Oral, Daily   Start Date: April 21, 2021        Continue These Medications      Instructions Start Date   albuterol 1.25 MG/3ML nebulizer solution  Commonly known as: ACCUNEB   1.25 mg, Nebulization, Every 6 Hours PRN      albuterol sulfate  (90 Base) MCG/ACT inhaler  Commonly known as: PROVENTIL HFA;VENTOLIN HFA;PROAIR HFA   2 puffs, Inhalation, Every 6 Hours PRN      aspirin 325 MG tablet   325 mg, Oral, As Needed      Spiriva Respimat 2.5 MCG/ACT aerosol solution inhaler  Generic drug: tiotropium bromide monohydrate   2 puffs, Inhalation, Daily - RT             INSTRUCTIONS:  Activity:   Activity Instructions     Activity as Tolerated          Diet:   Diet Instructions     Diet: Regular      Discharge Diet: Regular          FOLLOW UP:   Additional Instructions for the Follow-ups that You Need to Schedule     Call MD With Problems / Concerns   As directed      Instructions:   Wheezing, shortness of breath, fever, chills    Order Comments: Instructions: Wheezing, shortness of breath, fever, chills          Discharge Follow-up with PCP   As " directed       Currently Documented PCP:    Arvin Ness MD    PCP Phone Number:    [phone]     Follow Up Details: 1 week           Follow-up Information     Arvin Ness MD Follow up.    Specialties: Family Medicine, Emergency Medicine  Why: 1 week;If his office has not called by Thursday with your follow up time;please call them to schedule  Contact information:  [address] [place] [address]  [phone]                   PENDING TEST RESULTS AT DISCHARGE  Pending Labs     Order Current Status    Blood Culture - Blood, Arm, Left Preliminary result    Blood Culture - Blood, Arm, Right Preliminary result    Respiratory Culture - Sputum, Cough Preliminary result          Time: 35 minutes was spent in discharge planning, medication reconciliation and coordination of care for this patient.    Phil Lee MD is the attending at time of discharge, He is aware of the patient's status and agrees with the above discharge summary.      This document has been electronically signed by Nicholas Ryan MD on April 20, 2021 18:14 CDT    Part of this note may be an electronic transcription or translation of spoken language to printed text using the Dragon Dictation System              Electronically signed by Nicholas Ryan MD at 04/20/21 5433

## 2021-04-24 LAB
BACTERIA SPEC AEROBE CULT: NORMAL
BACTERIA SPEC AEROBE CULT: NORMAL

## 2021-04-26 LAB
QT INTERVAL: 332 MS
QTC INTERVAL: 455 MS

## 2021-04-27 ENCOUNTER — OFFICE VISIT (OUTPATIENT)
Dept: FAMILY MEDICINE CLINIC | Facility: CLINIC | Age: 61
End: 2021-04-27

## 2021-04-27 VITALS
SYSTOLIC BLOOD PRESSURE: 120 MMHG | BODY MASS INDEX: 27.24 KG/M2 | HEIGHT: 65 IN | OXYGEN SATURATION: 98 % | DIASTOLIC BLOOD PRESSURE: 62 MMHG | WEIGHT: 163.5 LBS | HEART RATE: 105 BPM

## 2021-04-27 DIAGNOSIS — Z09 HOSPITAL DISCHARGE FOLLOW-UP: Primary | ICD-10-CM

## 2021-04-27 DIAGNOSIS — J44.9 CHRONIC OBSTRUCTIVE PULMONARY DISEASE, UNSPECIFIED COPD TYPE (HCC): ICD-10-CM

## 2021-04-27 PROCEDURE — 99214 OFFICE O/P EST MOD 30 MIN: CPT | Performed by: FAMILY MEDICINE

## 2021-04-27 NOTE — PROGRESS NOTES
Subjective   Timmy Diop is a 60 y.o. male.   Cc: follow up of chronic medical issues    COPD  He complains of chest tightness, cough, difficulty breathing, frequent throat clearing, hoarse voice, shortness of breath, sputum production and wheezing. There is no hemoptysis. Pertinent negatives include no chest pain or fever.   The patient has been out of the hospital about a week.He went back to work yesterday. He is doing fairly well at work yesterday.    The following portions of the patient's history were reviewed and updated as appropriate: allergies, current medications, past family history, past medical history, past social history, past surgical history and problem list.    Review of Systems   Constitutional: Positive for fatigue. Negative for fever.   HENT: Positive for hoarse voice.    Respiratory: Positive for cough, sputum production, shortness of breath and wheezing. Negative for hemoptysis, choking and chest tightness.    Cardiovascular: Negative for chest pain and leg swelling.       Objective   Physical Exam  Vitals reviewed.   Constitutional:       Appearance: Normal appearance.   HENT:      Head: Normocephalic and atraumatic.      Right Ear: Tympanic membrane, ear canal and external ear normal.      Left Ear: Tympanic membrane, ear canal and external ear normal.      Nose: Nose normal.      Mouth/Throat:      Mouth: Mucous membranes are moist.      Pharynx: Oropharynx is clear.   Cardiovascular:      Rate and Rhythm: Normal rate and regular rhythm.      Heart sounds: Normal heart sounds. No murmur heard.   No friction rub. No gallop.    Pulmonary:      Effort: Pulmonary effort is normal. No respiratory distress.      Breath sounds: Normal breath sounds. No stridor. No wheezing, rhonchi or rales.   Chest:      Chest wall: No tenderness.   Abdominal:      General: Abdomen is flat. Bowel sounds are normal. There is no distension.      Palpations: Abdomen is soft.      Tenderness: There is no  "abdominal tenderness. There is no guarding.   Musculoskeletal:      Cervical back: Normal range of motion.   Skin:     General: Skin is warm and dry.   Neurological:      Mental Status: He is alert.           Visit Vitals  /62   Pulse 105   Ht 165.1 cm (65\")   Wt 74.2 kg (163 lb 8 oz)   SpO2 98%   BMI 27.21 kg/m²     Body mass index is 27.21 kg/m².      Assessment/Plan   Diagnoses and all orders for this visit:    1. Hospital discharge follow-up (Primary)    2. Chronic obstructive pulmonary disease, unspecified COPD type (CMS/HCC)    I reviewed his CBC,CMP and ABG with the patient.  Return to the clinic in 2 month/s.  Will contact with results as needed.    "

## 2021-07-07 ENCOUNTER — TELEPHONE (OUTPATIENT)
Dept: FAMILY MEDICINE CLINIC | Facility: CLINIC | Age: 61
End: 2021-07-07

## 2021-07-07 NOTE — TELEPHONE ENCOUNTER
Mrs. Diop called needing to discuss Timmy's inhaler with you. She did not give any details just that she needed to speak with Dr Ness about the inhaler.

## 2021-07-29 ENCOUNTER — OFFICE VISIT (OUTPATIENT)
Dept: FAMILY MEDICINE CLINIC | Facility: CLINIC | Age: 61
End: 2021-07-29

## 2021-07-29 ENCOUNTER — LAB (OUTPATIENT)
Dept: LAB | Facility: HOSPITAL | Age: 61
End: 2021-07-29

## 2021-07-29 VITALS
BODY MASS INDEX: 27.16 KG/M2 | WEIGHT: 163 LBS | DIASTOLIC BLOOD PRESSURE: 70 MMHG | OXYGEN SATURATION: 98 % | SYSTOLIC BLOOD PRESSURE: 126 MMHG | HEIGHT: 65 IN | HEART RATE: 92 BPM

## 2021-07-29 DIAGNOSIS — J42 CHRONIC BRONCHITIS, UNSPECIFIED CHRONIC BRONCHITIS TYPE (HCC): Primary | ICD-10-CM

## 2021-07-29 DIAGNOSIS — R07.89 CHEST TIGHTNESS: ICD-10-CM

## 2021-07-29 DIAGNOSIS — J42 CHRONIC BRONCHITIS, UNSPECIFIED CHRONIC BRONCHITIS TYPE (HCC): ICD-10-CM

## 2021-07-29 PROCEDURE — 85025 COMPLETE CBC W/AUTO DIFF WBC: CPT

## 2021-07-29 PROCEDURE — 99214 OFFICE O/P EST MOD 30 MIN: CPT | Performed by: FAMILY MEDICINE

## 2021-07-29 PROCEDURE — 36415 COLL VENOUS BLD VENIPUNCTURE: CPT

## 2021-07-29 PROCEDURE — 80053 COMPREHEN METABOLIC PANEL: CPT

## 2021-07-29 RX ORDER — ALBUTEROL SULFATE 90 UG/1
2 AEROSOL, METERED RESPIRATORY (INHALATION) EVERY 6 HOURS PRN
Qty: 18 G | Refills: 8 | Status: SHIPPED | OUTPATIENT
Start: 2021-07-29 | End: 2021-10-29 | Stop reason: SDUPTHER

## 2021-07-29 RX ORDER — ALBUTEROL SULFATE 1.25 MG/3ML
1 SOLUTION RESPIRATORY (INHALATION) EVERY 6 HOURS PRN
Qty: 30 EACH | Refills: 11 | Status: SHIPPED | OUTPATIENT
Start: 2021-07-29 | End: 2022-02-01 | Stop reason: SDUPTHER

## 2021-07-30 LAB
ALBUMIN SERPL-MCNC: 3.9 G/DL (ref 3.5–5.2)
ALBUMIN/GLOB SERPL: 1.3 G/DL
ALP SERPL-CCNC: 78 U/L (ref 39–117)
ALT SERPL W P-5'-P-CCNC: 30 U/L (ref 1–41)
ANION GAP SERPL CALCULATED.3IONS-SCNC: 8.1 MMOL/L (ref 5–15)
AST SERPL-CCNC: 25 U/L (ref 1–40)
BASOPHILS # BLD AUTO: 0.07 10*3/MM3 (ref 0–0.2)
BASOPHILS NFR BLD AUTO: 0.9 % (ref 0–1.5)
BILIRUB SERPL-MCNC: 0.5 MG/DL (ref 0–1.2)
BUN SERPL-MCNC: 8 MG/DL (ref 6–20)
BUN/CREAT SERPL: 14.8 (ref 7–25)
CALCIUM SPEC-SCNC: 9 MG/DL (ref 8.6–10.5)
CHLORIDE SERPL-SCNC: 104 MMOL/L (ref 98–107)
CO2 SERPL-SCNC: 30.9 MMOL/L (ref 22–29)
CREAT SERPL-MCNC: 0.54 MG/DL (ref 0.76–1.27)
DEPRECATED RDW RBC AUTO: 41.9 FL (ref 37–54)
EOSINOPHIL # BLD AUTO: 0.21 10*3/MM3 (ref 0–0.4)
EOSINOPHIL NFR BLD AUTO: 2.7 % (ref 0.3–6.2)
ERYTHROCYTE [DISTWIDTH] IN BLOOD BY AUTOMATED COUNT: 12.8 % (ref 12.3–15.4)
GFR SERPL CREATININE-BSD FRML MDRD: >150 ML/MIN/1.73
GLOBULIN UR ELPH-MCNC: 3.1 GM/DL
GLUCOSE SERPL-MCNC: 86 MG/DL (ref 65–99)
HCT VFR BLD AUTO: 47.5 % (ref 37.5–51)
HGB BLD-MCNC: 16 G/DL (ref 13–17.7)
IMM GRANULOCYTES # BLD AUTO: 0.05 10*3/MM3 (ref 0–0.05)
IMM GRANULOCYTES NFR BLD AUTO: 0.6 % (ref 0–0.5)
LYMPHOCYTES # BLD AUTO: 1.04 10*3/MM3 (ref 0.7–3.1)
LYMPHOCYTES NFR BLD AUTO: 13.3 % (ref 19.6–45.3)
MCH RBC QN AUTO: 30.6 PG (ref 26.6–33)
MCHC RBC AUTO-ENTMCNC: 33.7 G/DL (ref 31.5–35.7)
MCV RBC AUTO: 90.8 FL (ref 79–97)
MONOCYTES # BLD AUTO: 0.58 10*3/MM3 (ref 0.1–0.9)
MONOCYTES NFR BLD AUTO: 7.4 % (ref 5–12)
NEUTROPHILS NFR BLD AUTO: 5.86 10*3/MM3 (ref 1.7–7)
NEUTROPHILS NFR BLD AUTO: 75.1 % (ref 42.7–76)
NRBC BLD AUTO-RTO: 0 /100 WBC (ref 0–0.2)
PLATELET # BLD AUTO: 283 10*3/MM3 (ref 140–450)
PMV BLD AUTO: 10.8 FL (ref 6–12)
POTASSIUM SERPL-SCNC: 4.3 MMOL/L (ref 3.5–5.2)
PROT SERPL-MCNC: 7 G/DL (ref 6–8.5)
RBC # BLD AUTO: 5.23 10*6/MM3 (ref 4.14–5.8)
SODIUM SERPL-SCNC: 143 MMOL/L (ref 136–145)
WBC # BLD AUTO: 7.81 10*3/MM3 (ref 3.4–10.8)

## 2021-09-09 ENCOUNTER — TELEPHONE (OUTPATIENT)
Dept: FAMILY MEDICINE CLINIC | Facility: CLINIC | Age: 61
End: 2021-09-09

## 2021-09-09 RX ORDER — TIOTROPIUM BROMIDE INHALATION SPRAY 3.12 UG/1
2 SPRAY, METERED RESPIRATORY (INHALATION)
Qty: 4 G | Refills: 8 | Status: SHIPPED | OUTPATIENT
Start: 2021-09-09 | End: 2022-10-25 | Stop reason: SDUPTHER

## 2021-09-09 NOTE — TELEPHONE ENCOUNTER
Pt wife called to ask for sample inhaler for pt   tiotropium bromide monohydrate (Spiriva Respimat) 2.5 MCG/ACT aerosol solution inhaler    Is unable to afford this from the pharmacy at this time.

## 2021-09-10 ENCOUNTER — TELEPHONE (OUTPATIENT)
Dept: FAMILY MEDICINE CLINIC | Facility: CLINIC | Age: 61
End: 2021-09-10

## 2021-09-10 NOTE — TELEPHONE ENCOUNTER
Patient can not afford the prescription for the inhaler and is asking if we have Samples for a inhaler in office.    Thanks

## 2021-10-29 ENCOUNTER — OFFICE VISIT (OUTPATIENT)
Dept: FAMILY MEDICINE CLINIC | Facility: CLINIC | Age: 61
End: 2021-10-29

## 2021-10-29 ENCOUNTER — LAB (OUTPATIENT)
Dept: LAB | Facility: HOSPITAL | Age: 61
End: 2021-10-29

## 2021-10-29 VITALS
OXYGEN SATURATION: 98 % | BODY MASS INDEX: 26.85 KG/M2 | SYSTOLIC BLOOD PRESSURE: 128 MMHG | WEIGHT: 161.19 LBS | HEIGHT: 65 IN | HEART RATE: 92 BPM | DIASTOLIC BLOOD PRESSURE: 78 MMHG

## 2021-10-29 DIAGNOSIS — J42 CHRONIC BRONCHITIS, UNSPECIFIED CHRONIC BRONCHITIS TYPE (HCC): ICD-10-CM

## 2021-10-29 DIAGNOSIS — R07.89 CHEST TIGHTNESS: ICD-10-CM

## 2021-10-29 LAB
ALBUMIN SERPL-MCNC: 4.1 G/DL (ref 3.5–5.2)
ALBUMIN/GLOB SERPL: 1.4 G/DL
ALP SERPL-CCNC: 80 U/L (ref 39–117)
ALT SERPL W P-5'-P-CCNC: 29 U/L (ref 1–41)
ANION GAP SERPL CALCULATED.3IONS-SCNC: 8.7 MMOL/L (ref 5–15)
AST SERPL-CCNC: 23 U/L (ref 1–40)
BASOPHILS # BLD AUTO: 0.07 10*3/MM3 (ref 0–0.2)
BASOPHILS NFR BLD AUTO: 0.9 % (ref 0–1.5)
BILIRUB SERPL-MCNC: 0.6 MG/DL (ref 0–1.2)
BUN SERPL-MCNC: 8 MG/DL (ref 6–20)
BUN/CREAT SERPL: 12.1 (ref 7–25)
CALCIUM SPEC-SCNC: 9.3 MG/DL (ref 8.6–10.5)
CHLORIDE SERPL-SCNC: 104 MMOL/L (ref 98–107)
CO2 SERPL-SCNC: 30.3 MMOL/L (ref 22–29)
CREAT SERPL-MCNC: 0.66 MG/DL (ref 0.76–1.27)
DEPRECATED RDW RBC AUTO: 42.9 FL (ref 37–54)
EOSINOPHIL # BLD AUTO: 0.44 10*3/MM3 (ref 0–0.4)
EOSINOPHIL NFR BLD AUTO: 5.9 % (ref 0.3–6.2)
ERYTHROCYTE [DISTWIDTH] IN BLOOD BY AUTOMATED COUNT: 12.9 % (ref 12.3–15.4)
GFR SERPL CREATININE-BSD FRML MDRD: 124 ML/MIN/1.73
GLOBULIN UR ELPH-MCNC: 3 GM/DL
GLUCOSE SERPL-MCNC: 98 MG/DL (ref 65–99)
HCT VFR BLD AUTO: 52.3 % (ref 37.5–51)
HGB BLD-MCNC: 17.5 G/DL (ref 13–17.7)
IMM GRANULOCYTES # BLD AUTO: 0.03 10*3/MM3 (ref 0–0.05)
IMM GRANULOCYTES NFR BLD AUTO: 0.4 % (ref 0–0.5)
LYMPHOCYTES # BLD AUTO: 1.38 10*3/MM3 (ref 0.7–3.1)
LYMPHOCYTES NFR BLD AUTO: 18.5 % (ref 19.6–45.3)
MCH RBC QN AUTO: 30.7 PG (ref 26.6–33)
MCHC RBC AUTO-ENTMCNC: 33.5 G/DL (ref 31.5–35.7)
MCV RBC AUTO: 91.8 FL (ref 79–97)
MONOCYTES # BLD AUTO: 0.64 10*3/MM3 (ref 0.1–0.9)
MONOCYTES NFR BLD AUTO: 8.6 % (ref 5–12)
NEUTROPHILS NFR BLD AUTO: 4.89 10*3/MM3 (ref 1.7–7)
NEUTROPHILS NFR BLD AUTO: 65.7 % (ref 42.7–76)
NRBC BLD AUTO-RTO: 0 /100 WBC (ref 0–0.2)
PLATELET # BLD AUTO: 294 10*3/MM3 (ref 140–450)
PMV BLD AUTO: 10.7 FL (ref 6–12)
POTASSIUM SERPL-SCNC: 3.9 MMOL/L (ref 3.5–5.2)
PROT SERPL-MCNC: 7.1 G/DL (ref 6–8.5)
RBC # BLD AUTO: 5.7 10*6/MM3 (ref 4.14–5.8)
SODIUM SERPL-SCNC: 143 MMOL/L (ref 136–145)
WBC # BLD AUTO: 7.45 10*3/MM3 (ref 3.4–10.8)

## 2021-10-29 PROCEDURE — 90471 IMMUNIZATION ADMIN: CPT | Performed by: FAMILY MEDICINE

## 2021-10-29 PROCEDURE — 85025 COMPLETE CBC W/AUTO DIFF WBC: CPT

## 2021-10-29 PROCEDURE — 90686 IIV4 VACC NO PRSV 0.5 ML IM: CPT | Performed by: FAMILY MEDICINE

## 2021-10-29 PROCEDURE — 80053 COMPREHEN METABOLIC PANEL: CPT

## 2021-10-29 PROCEDURE — 36415 COLL VENOUS BLD VENIPUNCTURE: CPT

## 2021-10-29 PROCEDURE — 99214 OFFICE O/P EST MOD 30 MIN: CPT | Performed by: FAMILY MEDICINE

## 2021-10-29 RX ORDER — BUDESONIDE AND FORMOTEROL FUMARATE DIHYDRATE 160; 4.5 UG/1; UG/1
2 AEROSOL RESPIRATORY (INHALATION)
Qty: 10.2 G | Refills: 3 | Status: SHIPPED | OUTPATIENT
Start: 2021-10-29 | End: 2022-02-01 | Stop reason: CLARIF

## 2021-10-29 RX ORDER — ALBUTEROL SULFATE 90 UG/1
2 AEROSOL, METERED RESPIRATORY (INHALATION) EVERY 6 HOURS PRN
Qty: 18 G | Refills: 8 | Status: SHIPPED | OUTPATIENT
Start: 2021-10-29 | End: 2022-02-01 | Stop reason: SDUPTHER

## 2021-11-30 PROBLEM — L03.116 CELLULITIS OF LEFT KNEE: Status: ACTIVE | Noted: 2021-11-30

## 2021-12-15 ENCOUNTER — TELEPHONE (OUTPATIENT)
Dept: FAMILY MEDICINE CLINIC | Facility: CLINIC | Age: 61
End: 2021-12-15

## 2021-12-15 NOTE — TELEPHONE ENCOUNTER
Adult Mental Health Inpatient Program  Interdisciplinary Treatment Plan    Yamila Holloway  MRN:[id number]   :1996    2018    Diagnosis: \"Depression, Cannabis Withdrawal, Depression with Suicidal Ideation, Anxiety, PTSD, Eating Disorder\"    Plan Type: Discharge Plan    Your patient, Yamila Holloway was seen in the Adult Mental Health Inpatient Program.  Please see below for the patient agreed upon plan, including Goal(s), Focus Indicators, Outcomes and Interventions.       Goals:  Patient Reported Goal #1: Resolve Detox Safely   Goal #2: Adjust lifestye to support reovery. Establish recovery plan with treatment team on medicaiton management and treatment (Vivitrol).     Goal Type:  Pt Reported Goal #1 Type: Short Term Goal  Goal #2 Type: Long Term Goal     Objectives:  Nursing Objectives:  Patient will demonstrate compliance with and acknowledge of medications.     Therapy Objectives:  Patient will collaborate with treatment team in planning continuing care,  Patient will demonstrate and/or report use of mindfulness skills. Completed 19.     Interventions:  Nursing Interventions:  Discuss role medications play in symptom management.     Therapy Interventions:  Assist patient to develop relapse prevention plan using triggers and warning signs, Teach self-activation skills, Psychotherapist to complete AODA Recovery Plan with patient, Psychotherapist to complete Crisis Survival Plan with patient  Patient progress towards goals: Discharge Treatment Plan    Plan Update: Patient was appropriate during all groups and engaged in topics. Patient demonstrated insight on how to modify her recovery now with the use of medication assistance to decrease the impact of cravings. Patient is to begin AODA PHP on 19.    FAROOQ Walton    VOID

## 2021-12-16 ENCOUNTER — TELEPHONE (OUTPATIENT)
Dept: ORTHOPEDIC SURGERY | Facility: CLINIC | Age: 61
End: 2021-12-16

## 2021-12-16 NOTE — TELEPHONE ENCOUNTER
Patients wife called stating they are supposed to  samples of an inhaler but they cant make it so Cortney Cooper, daughter-in-law, will be picking it up..    Thanks

## 2022-01-17 ENCOUNTER — TELEPHONE (OUTPATIENT)
Dept: FAMILY MEDICINE CLINIC | Facility: CLINIC | Age: 62
End: 2022-01-17

## 2022-02-01 ENCOUNTER — LAB (OUTPATIENT)
Dept: LAB | Facility: HOSPITAL | Age: 62
End: 2022-02-01

## 2022-02-01 ENCOUNTER — OFFICE VISIT (OUTPATIENT)
Dept: FAMILY MEDICINE CLINIC | Facility: CLINIC | Age: 62
End: 2022-02-01

## 2022-02-01 VITALS
OXYGEN SATURATION: 98 % | BODY MASS INDEX: 26.85 KG/M2 | SYSTOLIC BLOOD PRESSURE: 128 MMHG | DIASTOLIC BLOOD PRESSURE: 78 MMHG | WEIGHT: 161.13 LBS | HEIGHT: 65 IN | HEART RATE: 82 BPM

## 2022-02-01 DIAGNOSIS — J44.9 CHRONIC OBSTRUCTIVE PULMONARY DISEASE, UNSPECIFIED COPD TYPE: ICD-10-CM

## 2022-02-01 DIAGNOSIS — R07.89 CHEST TIGHTNESS: ICD-10-CM

## 2022-02-01 DIAGNOSIS — J44.9 CHRONIC OBSTRUCTIVE PULMONARY DISEASE, UNSPECIFIED COPD TYPE: Primary | ICD-10-CM

## 2022-02-01 LAB
ALBUMIN SERPL-MCNC: 4 G/DL (ref 3.5–5.2)
ALBUMIN/GLOB SERPL: 1.4 G/DL
ALP SERPL-CCNC: 81 U/L (ref 39–117)
ALT SERPL W P-5'-P-CCNC: 24 U/L (ref 1–41)
ANION GAP SERPL CALCULATED.3IONS-SCNC: 7.3 MMOL/L (ref 5–15)
AST SERPL-CCNC: 20 U/L (ref 1–40)
BASOPHILS # BLD AUTO: 0.1 10*3/MM3 (ref 0–0.2)
BASOPHILS NFR BLD AUTO: 1.4 % (ref 0–1.5)
BILIRUB SERPL-MCNC: 0.4 MG/DL (ref 0–1.2)
BUN SERPL-MCNC: 7 MG/DL (ref 8–23)
BUN/CREAT SERPL: 10.9 (ref 7–25)
CALCIUM SPEC-SCNC: 9.3 MG/DL (ref 8.6–10.5)
CHLORIDE SERPL-SCNC: 105 MMOL/L (ref 98–107)
CO2 SERPL-SCNC: 31.7 MMOL/L (ref 22–29)
CREAT SERPL-MCNC: 0.64 MG/DL (ref 0.76–1.27)
DEPRECATED RDW RBC AUTO: 42.5 FL (ref 37–54)
EOSINOPHIL # BLD AUTO: 0.32 10*3/MM3 (ref 0–0.4)
EOSINOPHIL NFR BLD AUTO: 4.3 % (ref 0.3–6.2)
ERYTHROCYTE [DISTWIDTH] IN BLOOD BY AUTOMATED COUNT: 12.8 % (ref 12.3–15.4)
GFR SERPL CREATININE-BSD FRML MDRD: 128 ML/MIN/1.73
GLOBULIN UR ELPH-MCNC: 2.8 GM/DL
GLUCOSE SERPL-MCNC: 104 MG/DL (ref 65–99)
HCT VFR BLD AUTO: 51 % (ref 37.5–51)
HGB BLD-MCNC: 17.4 G/DL (ref 13–17.7)
IMM GRANULOCYTES # BLD AUTO: 0.05 10*3/MM3 (ref 0–0.05)
IMM GRANULOCYTES NFR BLD AUTO: 0.7 % (ref 0–0.5)
LYMPHOCYTES # BLD AUTO: 1.35 10*3/MM3 (ref 0.7–3.1)
LYMPHOCYTES NFR BLD AUTO: 18.3 % (ref 19.6–45.3)
MCH RBC QN AUTO: 30.8 PG (ref 26.6–33)
MCHC RBC AUTO-ENTMCNC: 34.1 G/DL (ref 31.5–35.7)
MCV RBC AUTO: 90.3 FL (ref 79–97)
MONOCYTES # BLD AUTO: 0.68 10*3/MM3 (ref 0.1–0.9)
MONOCYTES NFR BLD AUTO: 9.2 % (ref 5–12)
NEUTROPHILS NFR BLD AUTO: 4.89 10*3/MM3 (ref 1.7–7)
NEUTROPHILS NFR BLD AUTO: 66.1 % (ref 42.7–76)
NRBC BLD AUTO-RTO: 0 /100 WBC (ref 0–0.2)
PLATELET # BLD AUTO: 327 10*3/MM3 (ref 140–450)
PMV BLD AUTO: 10.8 FL (ref 6–12)
POTASSIUM SERPL-SCNC: 4.1 MMOL/L (ref 3.5–5.2)
PROT SERPL-MCNC: 6.8 G/DL (ref 6–8.5)
RBC # BLD AUTO: 5.65 10*6/MM3 (ref 4.14–5.8)
SODIUM SERPL-SCNC: 144 MMOL/L (ref 136–145)
WBC NRBC COR # BLD: 7.39 10*3/MM3 (ref 3.4–10.8)

## 2022-02-01 PROCEDURE — 80053 COMPREHEN METABOLIC PANEL: CPT

## 2022-02-01 PROCEDURE — 36415 COLL VENOUS BLD VENIPUNCTURE: CPT

## 2022-02-01 PROCEDURE — 85025 COMPLETE CBC W/AUTO DIFF WBC: CPT

## 2022-02-01 PROCEDURE — 99214 OFFICE O/P EST MOD 30 MIN: CPT | Performed by: FAMILY MEDICINE

## 2022-02-01 RX ORDER — ALBUTEROL SULFATE 90 UG/1
2 AEROSOL, METERED RESPIRATORY (INHALATION) EVERY 6 HOURS PRN
Qty: 18 G | Refills: 8 | Status: CANCELLED | OUTPATIENT
Start: 2022-02-01

## 2022-02-01 RX ORDER — ALBUTEROL SULFATE 1.25 MG/3ML
1 SOLUTION RESPIRATORY (INHALATION) EVERY 6 HOURS PRN
Qty: 25 EACH | Refills: 11 | Status: SHIPPED | OUTPATIENT
Start: 2022-02-01 | End: 2022-10-25 | Stop reason: SDUPTHER

## 2022-02-01 RX ORDER — ALBUTEROL SULFATE 90 UG/1
2 AEROSOL, METERED RESPIRATORY (INHALATION) EVERY 6 HOURS PRN
Qty: 18 G | Refills: 8 | Status: SHIPPED | OUTPATIENT
Start: 2022-02-01

## 2022-02-01 RX ORDER — DILTIAZEM HYDROCHLORIDE 60 MG/1
2 TABLET, FILM COATED ORAL 2 TIMES DAILY
Qty: 10.2 G | Refills: 11 | Status: SHIPPED | OUTPATIENT
Start: 2022-02-01

## 2022-02-01 RX ORDER — DILTIAZEM HYDROCHLORIDE 60 MG/1
2 TABLET, FILM COATED ORAL 2 TIMES DAILY
COMMUNITY
Start: 2021-12-23 | End: 2022-02-01 | Stop reason: SDUPTHER

## 2022-02-01 NOTE — PROGRESS NOTES
Subjective   Timmy Diop is a 61 y.o. male.   Cc: follow up of chronic medical issues    COPD  He complains of cough, difficulty breathing, frequent throat clearing, hoarse voice, shortness of breath and wheezing. There is no chest tightness, hemoptysis or sputum production. Associated symptoms include myalgias. Pertinent negatives include no fever.   He developes chest tightness when his COPD acts up. This past week ,he has been coughing more than usually.    The following portions of the patient's history were reviewed and updated as appropriate: allergies, current medications, past family history, past medical history, past social history, past surgical history and problem list.    Review of Systems   Constitutional: Positive for fatigue. Negative for fever.   HENT: Positive for hoarse voice.    Respiratory: Positive for cough, shortness of breath and wheezing. Negative for hemoptysis and sputum production.    Cardiovascular: Negative for palpitations and leg swelling.   Gastrointestinal: Negative for abdominal pain, constipation, diarrhea, nausea and vomiting.   Musculoskeletal: Positive for back pain and myalgias. Negative for arthralgias and gait problem.       Objective   Physical Exam  Vitals reviewed.   Constitutional:       Appearance: Normal appearance.   HENT:      Head: Normocephalic and atraumatic.      Right Ear: Tympanic membrane, ear canal and external ear normal.      Left Ear: Tympanic membrane, ear canal and external ear normal.      Nose: Nose normal.      Mouth/Throat:      Mouth: Mucous membranes are moist.      Pharynx: Oropharynx is clear.   Cardiovascular:      Rate and Rhythm: Normal rate and regular rhythm.      Heart sounds: Normal heart sounds. No murmur heard.  No friction rub. No gallop.    Pulmonary:      Effort: Pulmonary effort is normal. No respiratory distress.      Breath sounds: Normal breath sounds. No stridor. No wheezing, rhonchi or rales.   Chest:      Chest wall: No  "tenderness.   Abdominal:      General: Abdomen is flat. Bowel sounds are normal. There is no distension.      Palpations: Abdomen is soft. There is no mass.      Tenderness: There is no abdominal tenderness. There is no guarding or rebound.      Hernia: No hernia is present.   Musculoskeletal:      Cervical back: Normal range of motion and neck supple.   Skin:     General: Skin is warm.   Neurological:      Mental Status: He is alert.           Visit Vitals  /78   Pulse 82   Ht 165.1 cm (65\")   Wt 73.1 kg (161 lb 2 oz)   SpO2 98%   BMI 26.81 kg/m²     Body mass index is 26.81 kg/m².      Assessment/Plan   Diagnoses and all orders for this visit:    1. Chronic obstructive pulmonary disease, unspecified COPD type (HCC) (Primary)  -     Symbicort 80-4.5 MCG/ACT inhaler; Inhale 2 puffs 2 (Two) Times a Day.  Dispense: 10.2 g; Refill: 11  -     Comprehensive metabolic panel; Future  -     CBC w AUTO Differential; Future    2. Chest tightness  -     albuterol sulfate  (90 Base) MCG/ACT inhaler; Inhale 2 puffs Every 6 (Six) Hours As Needed for Wheezing.  Dispense: 18 g; Refill: 8  -     albuterol (ACCUNEB) 1.25 MG/3ML nebulizer solution; Take 3 mL by nebulization Every 6 (Six) Hours As Needed for Wheezing.  Dispense: 25 each; Refill: 11    I reviewed the CBC and  CMP with the patient..  I discussed getting the Booster for COVID 19.  Return to the clinic in 3 month/s.  Will contact with results as needed.    "

## 2022-03-04 NOTE — PROGRESS NOTES
HISTORY OF PRESENT ILLNESS  Chin Porras is a 63 y.o. female. HPI     Last here 10/26/20. Pt is here for routine care. This is an established visit completed with telemedicine was completed with video assist  the patient acknowledges and agrees to this method of visitation doxyme     BP at home is controlled     Wt was 168 lb lov she now weighs around 189 pounds per her report     Reviewed labs     She sees Dr. Yaa Ramirez (endo) for hypothyroid has not seen him since November 2020  She is on synthroid decreased from 150 mcg to 125 mcg for thyroid tsh was suppressed several times--she just had a TSH drawn from him though she has not seen him and it was around 3.5 she scanned into my chart  Pt reports feeling tired and sluggish on this dose, has had some weight gain and difficulty losing weight      PREVENTIVE:  Colonoscopy: 4/16, Dr. Radha Garcia, repeat 10 years  Dexa:  Not yet needed  Mammo: 11/20 parades   Pap: Dr. Miguel Olivia summer 2021  Tdap: 1/11, due  Pneumovax: not yet needed  Shingrix: due discussed  A1c: 11/20 5.3  Flu shot: 12/9/21  Eye exam: 2021  Hep C screen: 11/20 negative  Lipids: 11/12/20 LDL 97  Covid: both + booster (moderna)    Patient Active Problem List    Diagnosis Date Noted    Hypoglycemia 09/15/2010    Hypothyroidism 06/23/2009     Current Outpatient Medications   Medication Sig Dispense Refill    Synthroid 125 mcg tablet TAKE 1 TAB BY MOUTH DAILY (BEFORE BREAKFAST). 30 Tab 0    OTHER 750 mg. Indications: Tumeric      POTASSIUM/MAGNESIUM (MAGNESIUM-POTASSIUM PO) Take 250 mg by mouth.  KRILL OIL PO Take 1,000 mg by mouth.  coenzyme q10 (CO Q-10) 10 mg cap Take  by mouth.  MULTIVITAMINS (MULTIVITAMIN PO) Take 1 Tab by mouth.  Calcium-Vitamin D3-Vitamin K (VIACTIV) 572-077-34 mg-unit-mcg Chew Take  by mouth three (3) times daily (after meals).        Past Surgical History:   Procedure Laterality Date    HX HEENT      partial thyroidectomy for goiter      Lab Results Subjective   Timmy Diop is a 60 y.o. male.   .Cc: follow up of chronic medical issues    COPD  He complains of chest tightness, cough, difficulty breathing, frequent throat clearing, hoarse voice, shortness of breath and wheezing. There is no hemoptysis or sputum production. Associated symptoms include dyspnea on exertion, malaise/fatigue, orthopnea and PND. Pertinent negatives include no appetite change, chest pain, ear congestion, ear pain, fever, headaches, heartburn, myalgias, nasal congestion, postnasal drip, rhinorrhea, sneezing, sore throat, sweats or trouble swallowing.   He will wake up at night with a feeling of tightness in his chest. He will sit up and feel better.    The following portions of the patient's history were reviewed and updated as appropriate: allergies, current medications, past family history, past medical history, past social history, past surgical history and problem list.    Review of Systems   Constitutional: Positive for malaise/fatigue. Negative for appetite change and fever.   HENT: Positive for hoarse voice. Negative for ear pain, postnasal drip, rhinorrhea, sneezing, sore throat and trouble swallowing.    Respiratory: Positive for cough, shortness of breath and wheezing. Negative for hemoptysis and sputum production.    Cardiovascular: Positive for dyspnea on exertion and PND. Negative for chest pain.   Gastrointestinal: Negative for heartburn.   Musculoskeletal: Negative for myalgias.   Neurological: Negative for headaches.       Objective   Physical Exam  Vitals reviewed.   Constitutional:       Appearance: Normal appearance.   HENT:      Head: Normocephalic and atraumatic.      Right Ear: Tympanic membrane, ear canal and external ear normal.      Left Ear: Tympanic membrane, ear canal and external ear normal.      Nose: Nose normal.      Mouth/Throat:      Mouth: Mucous membranes are moist.   Cardiovascular:      Rate and Rhythm: Normal rate and regular rhythm.       "Heart sounds: Normal heart sounds. No murmur heard.   No friction rub. No gallop.    Pulmonary:      Effort: Pulmonary effort is normal. No respiratory distress.      Breath sounds: Normal breath sounds. No stridor. No wheezing, rhonchi or rales.   Chest:      Chest wall: No tenderness.   Abdominal:      General: Abdomen is flat. Bowel sounds are normal. There is no distension.      Palpations: Abdomen is soft. There is no mass.      Tenderness: There is no abdominal tenderness. There is no guarding or rebound.      Hernia: No hernia is present.   Musculoskeletal:      Cervical back: Normal range of motion.   Skin:     General: Skin is warm and dry.   Neurological:      Mental Status: He is alert.           Visit Vitals  /70   Pulse 92   Ht 165.1 cm (65\")   Wt 73.9 kg (163 lb)   SpO2 98%   BMI 27.12 kg/m²     Body mass index is 27.12 kg/m².      Assessment/Plan   Diagnoses and all orders for this visit:    1. Chronic bronchitis, unspecified chronic bronchitis type (CMS/HCC) (Primary)  -     albuterol (ACCUNEB) 1.25 MG/3ML nebulizer solution; Take 3 mL by nebulization Every 6 (Six) Hours As Needed for Wheezing.  Dispense: 30 each; Refill: 11  -     albuterol sulfate  (90 Base) MCG/ACT inhaler; Inhale 2 puffs Every 6 (Six) Hours As Needed for Wheezing.  Dispense: 18 g; Refill: 8  -     Comprehensive metabolic panel; Future  -     CBC w AUTO Differential; Future    2. Chest tightness  -     albuterol (ACCUNEB) 1.25 MG/3ML nebulizer solution; Take 3 mL by nebulization Every 6 (Six) Hours As Needed for Wheezing.  Dispense: 30 each; Refill: 11  -     albuterol sulfate  (90 Base) MCG/ACT inhaler; Inhale 2 puffs Every 6 (Six) Hours As Needed for Wheezing.  Dispense: 18 g; Refill: 8    Return to the clinic in 3 month/s.  Will contact with results as needed.    " Component Value Date/Time    WBC 4.0 11/12/2020 09:09 AM    HGB 13.4 11/12/2020 09:09 AM    HCT 39.8 11/12/2020 09:09 AM    PLATELET 112 46/86/7077 09:09 AM    MCV 92 11/12/2020 09:09 AM     Lab Results   Component Value Date/Time    Cholesterol, total 171 11/12/2020 09:09 AM    HDL Cholesterol 61 11/12/2020 09:09 AM    LDL, calculated 97 11/12/2020 09:09 AM    LDL, calculated 104 (H) 03/21/2017 09:07 AM    Triglyceride 70 11/12/2020 09:09 AM     Lab Results   Component Value Date/Time    GFR est non-AA 86 11/12/2020 09:09 AM    GFR est AA 99 11/12/2020 09:09 AM    Creatinine 0.78 11/12/2020 09:09 AM    BUN 17 11/12/2020 09:09 AM    Sodium 141 11/12/2020 09:09 AM    Potassium 5.1 11/12/2020 09:09 AM    Chloride 103 11/12/2020 09:09 AM    CO2 25 11/12/2020 09:09 AM        Review of Systems   Constitutional: Positive for malaise/fatigue (fatigue). Respiratory: Negative for shortness of breath. Cardiovascular: Negative for chest pain. Physical Exam  Constitutional:       General: She is not in acute distress. Appearance: Normal appearance. She is not ill-appearing, toxic-appearing or diaphoretic. HENT:      Head: Normocephalic and atraumatic. Eyes:      General:         Right eye: No discharge. Left eye: No discharge. Conjunctiva/sclera: Conjunctivae normal.   Pulmonary:      Effort: No respiratory distress. Neurological:      Mental Status: She is alert and oriented to person, place, and time. Mental status is at baseline. Gait: Gait normal.   Psychiatric:         Mood and Affect: Mood normal.         Behavior: Behavior normal.         ASSESSMENT and PLAN    ICD-10-CM ICD-9-CM    1.  Physical exam  Z00.00 V70.9 LIPID PANEL      METABOLIC PANEL, COMPREHENSIVE   Due for labs will complete in 4 weeks    Needs to schedule pelvic exam    Due for tdap/and shingrix    covid vaccine utd and flu shot utd    Will get colo report for review, utd   HEMOGLOBIN A1C WITH EAG      TSH 3RD GENERATION      CBC W/O DIFF   2. Acquired hypothyroidism  E03.9 244.9 Synthroid 125 mcg tablet      LIPID PANEL   synthroid decreased from 150 mcg to 125 mcg, most recetn tsh was 3.5 (scanned in )    Will increase synthroid to 150mcg 3d per week    125mcg remaining 4 days per week    Will have her complete labs in 4 weeks will adjust meds further as needed       METABOLIC PANEL, COMPREHENSIVE      HEMOGLOBIN A1C WITH EAG      TSH 3RD GENERATION      CBC W/O DIFF      Depression screen reviewed and negative     Scribed by Santiago Bowens of [facility], as dictated by Dr. Ramesh Medina. Current diagnosis and concerns discussed with pt at length. Pt understands risks and benefits or current treatment plan and medications, and accepts the treatment and medication with any possible risks. Pt asks appropriate questions, which were answered. Pt was instructed to call with any concerns or problems. I have reviewed the note documented by the scribe. The services provided are my own. The documentation is accurate     Dominiqueanant Lisajohn, was evaluated through a synchronous (real-time) audio-video encounter. The patient (or guardian if applicable) is aware that this is a billable service. Verbal consent to proceed has been obtained. The visit was conducted pursuant to the emergency declaration under the [facility], [address] [place] [address] authority and the [employer] and [employer] General Act. Patient identification was verified, and a caregiver was present when appropriate. The patient was located at home in a state where the provider was licensed to provide care.

## 2022-03-14 ENCOUNTER — TELEPHONE (OUTPATIENT)
Dept: FAMILY MEDICINE CLINIC | Facility: CLINIC | Age: 62
End: 2022-03-14

## 2022-03-14 NOTE — TELEPHONE ENCOUNTER
Mrs Diop called asking to see if we have any samples of all of Mr Diop's inhalers except for his Proventil. Please call back @ [phone]

## 2022-05-02 ENCOUNTER — OFFICE VISIT (OUTPATIENT)
Dept: FAMILY MEDICINE CLINIC | Facility: CLINIC | Age: 62
End: 2022-05-02

## 2022-05-02 VITALS
DIASTOLIC BLOOD PRESSURE: 72 MMHG | OXYGEN SATURATION: 99 % | SYSTOLIC BLOOD PRESSURE: 126 MMHG | HEIGHT: 65 IN | WEIGHT: 160.44 LBS | HEART RATE: 84 BPM | BODY MASS INDEX: 26.73 KG/M2

## 2022-05-02 DIAGNOSIS — K21.9 GASTROESOPHAGEAL REFLUX DISEASE, UNSPECIFIED WHETHER ESOPHAGITIS PRESENT: ICD-10-CM

## 2022-05-02 DIAGNOSIS — J44.9 CHRONIC OBSTRUCTIVE PULMONARY DISEASE, UNSPECIFIED COPD TYPE: Primary | ICD-10-CM

## 2022-05-02 PROCEDURE — 99214 OFFICE O/P EST MOD 30 MIN: CPT | Performed by: FAMILY MEDICINE

## 2022-05-02 NOTE — PROGRESS NOTES
Subjective   Timmy Diop is a 61 y.o. male.   Cc: follow up of chronic medical issues    COPD  He complains of shortness of breath. There is no chest tightness, cough, difficulty breathing, frequent throat clearing, hemoptysis, hoarse voice, sputum production or wheezing. Associated symptoms include heartburn and a sore throat. Pertinent negatives include no chest pain.   Heartburn  He complains of early satiety, heartburn and a sore throat. He reports no abdominal pain, no belching, no chest pain, no choking, no coughing, no dysphagia, no hoarse voice, no nausea or no wheezing.       The following portions of the patient's history were reviewed and updated as appropriate: allergies, current medications, past family history, past medical history, past social history, past surgical history and problem list.    Review of Systems   HENT: Positive for sore throat. Negative for hoarse voice.    Respiratory: Positive for shortness of breath. Negative for cough, hemoptysis, sputum production, choking and wheezing.    Cardiovascular: Negative for chest pain.   Gastrointestinal: Positive for heartburn. Negative for abdominal pain, dysphagia and nausea.       Objective   Physical Exam  Vitals reviewed.   Constitutional:       Appearance: Normal appearance.   HENT:      Head: Normocephalic and atraumatic.      Right Ear: Tympanic membrane, ear canal and external ear normal.      Left Ear: Tympanic membrane, ear canal and external ear normal.      Nose: Nose normal.      Mouth/Throat:      Mouth: Mucous membranes are moist.      Pharynx: Oropharynx is clear.   Cardiovascular:      Rate and Rhythm: Normal rate.      Heart sounds: Normal heart sounds. No murmur heard.    No friction rub. No gallop.   Pulmonary:      Effort: Pulmonary effort is normal. No respiratory distress.      Breath sounds: Normal breath sounds. No stridor. No wheezing, rhonchi or rales.   Chest:      Chest wall: No tenderness.   Abdominal:       "General: Bowel sounds are normal. There is no distension.      Palpations: Abdomen is soft. There is no mass.      Tenderness: There is no abdominal tenderness. There is no guarding or rebound.      Hernia: No hernia is present.   Musculoskeletal:      Cervical back: Normal range of motion.   Skin:     General: Skin is warm and dry.   Neurological:      Mental Status: He is alert.           Visit Vitals  /72   Pulse 84   Ht 165.1 cm (65\")   Wt 72.8 kg (160 lb 7 oz)   SpO2 99%   BMI 26.70 kg/m²     Body mass index is 26.7 kg/m².      Assessment/Plan   Diagnoses and all orders for this visit:    1. Chronic obstructive pulmonary disease, unspecified COPD type (HCC) (Primary)  -     Comprehensive metabolic panel; Future  -     CBC w AUTO Differential; Future    2. Gastroesophageal reflux disease, unspecified whether esophagitis present    I reviewed the CBC and CMP with the patient.  Return to the clinic in 3 month/s.  Will contact with results as needed.  Continue with home treatment for GERD.  "

## 2022-05-11 DIAGNOSIS — J44.9 CHRONIC OBSTRUCTIVE PULMONARY DISEASE, UNSPECIFIED COPD TYPE: ICD-10-CM

## 2022-05-11 DIAGNOSIS — J43.9 PULMONARY EMPHYSEMA, UNSPECIFIED EMPHYSEMA TYPE: Primary | ICD-10-CM

## 2022-05-26 ENCOUNTER — LAB (OUTPATIENT)
Dept: LAB | Facility: HOSPITAL | Age: 62
End: 2022-05-26

## 2022-05-26 DIAGNOSIS — J44.9 CHRONIC OBSTRUCTIVE PULMONARY DISEASE, UNSPECIFIED COPD TYPE: ICD-10-CM

## 2022-05-26 LAB
BASOPHILS # BLD AUTO: 0.06 10*3/MM3 (ref 0–0.2)
BASOPHILS NFR BLD AUTO: 0.7 % (ref 0–1.5)
DEPRECATED RDW RBC AUTO: 44.7 FL (ref 37–54)
EOSINOPHIL # BLD AUTO: 0.32 10*3/MM3 (ref 0–0.4)
EOSINOPHIL NFR BLD AUTO: 3.6 % (ref 0.3–6.2)
ERYTHROCYTE [DISTWIDTH] IN BLOOD BY AUTOMATED COUNT: 13.5 % (ref 12.3–15.4)
HCT VFR BLD AUTO: 51.4 % (ref 37.5–51)
HGB BLD-MCNC: 17.6 G/DL (ref 13–17.7)
IMM GRANULOCYTES # BLD AUTO: 0.04 10*3/MM3 (ref 0–0.05)
IMM GRANULOCYTES NFR BLD AUTO: 0.4 % (ref 0–0.5)
LYMPHOCYTES # BLD AUTO: 1.8 10*3/MM3 (ref 0.7–3.1)
LYMPHOCYTES NFR BLD AUTO: 20.2 % (ref 19.6–45.3)
MCH RBC QN AUTO: 30.9 PG (ref 26.6–33)
MCHC RBC AUTO-ENTMCNC: 34.2 G/DL (ref 31.5–35.7)
MCV RBC AUTO: 90.2 FL (ref 79–97)
MONOCYTES # BLD AUTO: 0.95 10*3/MM3 (ref 0.1–0.9)
MONOCYTES NFR BLD AUTO: 10.7 % (ref 5–12)
NEUTROPHILS NFR BLD AUTO: 5.73 10*3/MM3 (ref 1.7–7)
NEUTROPHILS NFR BLD AUTO: 64.4 % (ref 42.7–76)
NRBC BLD AUTO-RTO: 0 /100 WBC (ref 0–0.2)
PLATELET # BLD AUTO: 350 10*3/MM3 (ref 140–450)
PMV BLD AUTO: 10.7 FL (ref 6–12)
RBC # BLD AUTO: 5.7 10*6/MM3 (ref 4.14–5.8)
WBC NRBC COR # BLD: 8.9 10*3/MM3 (ref 3.4–10.8)

## 2022-05-26 PROCEDURE — 80053 COMPREHEN METABOLIC PANEL: CPT

## 2022-05-26 PROCEDURE — 36415 COLL VENOUS BLD VENIPUNCTURE: CPT

## 2022-05-26 PROCEDURE — 85025 COMPLETE CBC W/AUTO DIFF WBC: CPT

## 2022-05-27 LAB
ALBUMIN SERPL-MCNC: 3.7 G/DL (ref 3.5–5.2)
ALBUMIN/GLOB SERPL: 1 G/DL
ALP SERPL-CCNC: 87 U/L (ref 39–117)
ALT SERPL W P-5'-P-CCNC: 31 U/L (ref 1–41)
ANION GAP SERPL CALCULATED.3IONS-SCNC: 12.2 MMOL/L (ref 5–15)
AST SERPL-CCNC: 32 U/L (ref 1–40)
BILIRUB SERPL-MCNC: 0.6 MG/DL (ref 0–1.2)
BUN SERPL-MCNC: 7 MG/DL (ref 8–23)
BUN/CREAT SERPL: 11.5 (ref 7–25)
CALCIUM SPEC-SCNC: 9.2 MG/DL (ref 8.6–10.5)
CHLORIDE SERPL-SCNC: 101 MMOL/L (ref 98–107)
CO2 SERPL-SCNC: 27.8 MMOL/L (ref 22–29)
CREAT SERPL-MCNC: 0.61 MG/DL (ref 0.76–1.27)
EGFRCR SERPLBLD CKD-EPI 2021: 110 ML/MIN/1.73
GLOBULIN UR ELPH-MCNC: 3.6 GM/DL
GLUCOSE SERPL-MCNC: 73 MG/DL (ref 65–99)
POTASSIUM SERPL-SCNC: 4.3 MMOL/L (ref 3.5–5.2)
PROT SERPL-MCNC: 7.3 G/DL (ref 6–8.5)
SODIUM SERPL-SCNC: 141 MMOL/L (ref 136–145)

## 2022-08-02 ENCOUNTER — OFFICE VISIT (OUTPATIENT)
Dept: FAMILY MEDICINE CLINIC | Facility: CLINIC | Age: 62
End: 2022-08-02

## 2022-08-02 ENCOUNTER — LAB (OUTPATIENT)
Dept: LAB | Facility: HOSPITAL | Age: 62
End: 2022-08-02

## 2022-08-02 VITALS
HEART RATE: 90 BPM | OXYGEN SATURATION: 98 % | HEIGHT: 65 IN | DIASTOLIC BLOOD PRESSURE: 68 MMHG | SYSTOLIC BLOOD PRESSURE: 122 MMHG | BODY MASS INDEX: 26.23 KG/M2 | WEIGHT: 157.44 LBS

## 2022-08-02 DIAGNOSIS — K21.9 GASTROESOPHAGEAL REFLUX DISEASE, UNSPECIFIED WHETHER ESOPHAGITIS PRESENT: ICD-10-CM

## 2022-08-02 DIAGNOSIS — J44.9 CHRONIC OBSTRUCTIVE PULMONARY DISEASE, UNSPECIFIED COPD TYPE: ICD-10-CM

## 2022-08-02 DIAGNOSIS — J44.9 CHRONIC OBSTRUCTIVE PULMONARY DISEASE, UNSPECIFIED COPD TYPE: Primary | ICD-10-CM

## 2022-08-02 PROCEDURE — 36415 COLL VENOUS BLD VENIPUNCTURE: CPT

## 2022-08-02 PROCEDURE — 80053 COMPREHEN METABOLIC PANEL: CPT

## 2022-08-02 PROCEDURE — 85025 COMPLETE CBC W/AUTO DIFF WBC: CPT

## 2022-08-02 PROCEDURE — 99214 OFFICE O/P EST MOD 30 MIN: CPT | Performed by: FAMILY MEDICINE

## 2022-08-02 NOTE — PROGRESS NOTES
Subjective   Timmy Diop is a 61 y.o. male.   Cc: follow up of chronic medical issues    COPD  There is no chest tightness, cough, difficulty breathing, frequent throat clearing, hemoptysis, hoarse voice, shortness of breath, sputum production or wheezing. Pertinent negatives include no chest pain, heartburn or sore throat.   Heartburn  He complains of early satiety. He reports no abdominal pain, no belching, no chest pain, no choking, no coughing, no dysphagia, no heartburn, no hoarse voice, no nausea, no sore throat, no water brash or no wheezing.       The following portions of the patient's history were reviewed and updated as appropriate: allergies, current medications, past family history, past medical history, past social history, past surgical history and problem list.    Review of Systems   HENT: Negative for hoarse voice and sore throat.    Respiratory: Negative for cough, hemoptysis, sputum production, choking, shortness of breath and wheezing.    Cardiovascular: Negative for chest pain.   Gastrointestinal: Negative for abdominal pain, dysphagia, heartburn and nausea.       Objective   Physical Exam  Vitals reviewed.   Constitutional:       Appearance: Normal appearance.   HENT:      Head: Normocephalic and atraumatic.      Right Ear: Tympanic membrane, ear canal and external ear normal.      Left Ear: Tympanic membrane, ear canal and external ear normal.      Nose: Nose normal.      Mouth/Throat:      Mouth: Mucous membranes are moist.      Pharynx: Oropharynx is clear.   Cardiovascular:      Rate and Rhythm: Normal rate and regular rhythm.      Heart sounds: Normal heart sounds. No murmur heard.    No friction rub. No gallop.   Pulmonary:      Effort: Pulmonary effort is normal. No respiratory distress.      Breath sounds: Normal breath sounds. No stridor. No wheezing, rhonchi or rales.   Chest:      Chest wall: No tenderness.   Abdominal:      General: Bowel sounds are normal. There is no  "distension.      Palpations: Abdomen is soft. There is no mass.      Tenderness: There is no abdominal tenderness. There is no guarding or rebound.      Hernia: No hernia is present.   Musculoskeletal:      Cervical back: Normal range of motion.   Skin:     General: Skin is warm and dry.   Neurological:      Mental Status: He is alert.           Visit Vitals  /68   Pulse 90   Ht 165.1 cm (65\")   Wt 71.4 kg (157 lb 7 oz)   SpO2 98%   BMI 26.20 kg/m²     Body mass index is 26.2 kg/m².      Assessment/Plan   Diagnoses and all orders for this visit:    1. Chronic obstructive pulmonary disease, unspecified COPD type (HCC) (Primary)  -     Comprehensive metabolic panel; Future  -     CBC w AUTO Differential; Future    2. Gastroesophageal reflux disease, unspecified whether esophagitis present    I reviewed her CBC and CMP with the patient.  Return to the clinic in 3 month/s.  Will contact with results as needed.    "

## 2022-08-03 LAB
ALBUMIN SERPL-MCNC: 3.8 G/DL (ref 3.5–5.2)
ALBUMIN/GLOB SERPL: 1.3 G/DL
ALP SERPL-CCNC: 78 U/L (ref 39–117)
ALT SERPL W P-5'-P-CCNC: 23 U/L (ref 1–41)
ANION GAP SERPL CALCULATED.3IONS-SCNC: 11 MMOL/L (ref 5–15)
AST SERPL-CCNC: 22 U/L (ref 1–40)
BASOPHILS # BLD AUTO: 0.07 10*3/MM3 (ref 0–0.2)
BASOPHILS NFR BLD AUTO: 0.7 % (ref 0–1.5)
BILIRUB SERPL-MCNC: 0.3 MG/DL (ref 0–1.2)
BUN SERPL-MCNC: 7 MG/DL (ref 8–23)
BUN/CREAT SERPL: 11.9 (ref 7–25)
CALCIUM SPEC-SCNC: 9 MG/DL (ref 8.6–10.5)
CHLORIDE SERPL-SCNC: 105 MMOL/L (ref 98–107)
CO2 SERPL-SCNC: 28 MMOL/L (ref 22–29)
CREAT SERPL-MCNC: 0.59 MG/DL (ref 0.76–1.27)
DEPRECATED RDW RBC AUTO: 42.3 FL (ref 37–54)
EGFRCR SERPLBLD CKD-EPI 2021: 111.1 ML/MIN/1.73
EOSINOPHIL # BLD AUTO: 0.32 10*3/MM3 (ref 0–0.4)
EOSINOPHIL NFR BLD AUTO: 3 % (ref 0.3–6.2)
ERYTHROCYTE [DISTWIDTH] IN BLOOD BY AUTOMATED COUNT: 13 % (ref 12.3–15.4)
GLOBULIN UR ELPH-MCNC: 3 GM/DL
GLUCOSE SERPL-MCNC: 96 MG/DL (ref 65–99)
HCT VFR BLD AUTO: 47.5 % (ref 37.5–51)
HGB BLD-MCNC: 16.3 G/DL (ref 13–17.7)
IMM GRANULOCYTES # BLD AUTO: 0.05 10*3/MM3 (ref 0–0.05)
IMM GRANULOCYTES NFR BLD AUTO: 0.5 % (ref 0–0.5)
LYMPHOCYTES # BLD AUTO: 1.72 10*3/MM3 (ref 0.7–3.1)
LYMPHOCYTES NFR BLD AUTO: 16.1 % (ref 19.6–45.3)
MCH RBC QN AUTO: 31 PG (ref 26.6–33)
MCHC RBC AUTO-ENTMCNC: 34.3 G/DL (ref 31.5–35.7)
MCV RBC AUTO: 90.3 FL (ref 79–97)
MONOCYTES # BLD AUTO: 1.03 10*3/MM3 (ref 0.1–0.9)
MONOCYTES NFR BLD AUTO: 9.6 % (ref 5–12)
NEUTROPHILS NFR BLD AUTO: 7.49 10*3/MM3 (ref 1.7–7)
NEUTROPHILS NFR BLD AUTO: 70.1 % (ref 42.7–76)
NRBC BLD AUTO-RTO: 0 /100 WBC (ref 0–0.2)
PLATELET # BLD AUTO: 313 10*3/MM3 (ref 140–450)
PMV BLD AUTO: 10.8 FL (ref 6–12)
POTASSIUM SERPL-SCNC: 3.9 MMOL/L (ref 3.5–5.2)
PROT SERPL-MCNC: 6.8 G/DL (ref 6–8.5)
RBC # BLD AUTO: 5.26 10*6/MM3 (ref 4.14–5.8)
SODIUM SERPL-SCNC: 144 MMOL/L (ref 136–145)
WBC NRBC COR # BLD: 10.68 10*3/MM3 (ref 3.4–10.8)

## 2022-10-25 ENCOUNTER — OFFICE VISIT (OUTPATIENT)
Dept: FAMILY MEDICINE CLINIC | Facility: CLINIC | Age: 62
End: 2022-10-25

## 2022-10-25 VITALS
WEIGHT: 151.06 LBS | OXYGEN SATURATION: 94 % | HEIGHT: 65 IN | SYSTOLIC BLOOD PRESSURE: 128 MMHG | DIASTOLIC BLOOD PRESSURE: 64 MMHG | BODY MASS INDEX: 25.17 KG/M2 | HEART RATE: 104 BPM

## 2022-10-25 DIAGNOSIS — J44.9 CHRONIC OBSTRUCTIVE PULMONARY DISEASE, UNSPECIFIED COPD TYPE: Primary | ICD-10-CM

## 2022-10-25 DIAGNOSIS — K21.9 GASTROESOPHAGEAL REFLUX DISEASE WITHOUT ESOPHAGITIS: ICD-10-CM

## 2022-10-25 DIAGNOSIS — R07.89 CHEST TIGHTNESS: ICD-10-CM

## 2022-10-25 PROCEDURE — 99214 OFFICE O/P EST MOD 30 MIN: CPT | Performed by: FAMILY MEDICINE

## 2022-10-25 RX ORDER — TIOTROPIUM BROMIDE INHALATION SPRAY 3.12 UG/1
2 SPRAY, METERED RESPIRATORY (INHALATION)
Qty: 4 G | Refills: 8 | Status: SHIPPED | OUTPATIENT
Start: 2022-10-25

## 2022-10-25 RX ORDER — ALBUTEROL SULFATE 1.25 MG/3ML
1 SOLUTION RESPIRATORY (INHALATION) EVERY 6 HOURS PRN
Qty: 25 EACH | Refills: 11 | Status: SHIPPED | OUTPATIENT
Start: 2022-10-25

## 2022-10-25 NOTE — PROGRESS NOTES
Subjective   Timmy Diop is a 61 y.o. male.   Cc: follow up of chronic medical issues    COPD  He complains of chest tightness, cough, difficulty breathing and shortness of breath. There is no frequent throat clearing, hemoptysis, hoarse voice, sputum production or wheezing. Pertinent negatives include no chest pain, heartburn or sore throat.   Heartburn  He complains of coughing. He reports no abdominal pain, no belching, no chest pain, no choking, no dysphagia, no early satiety, no heartburn, no hoarse voice, no nausea, no sore throat, no water brash or no wheezing.   He also feels as if he has chest tightness. His nebs seem to help.    The following portions of the patient's history were reviewed and updated as appropriate: allergies, current medications, past family history, past medical history, past social history, past surgical history and problem list.    Review of Systems   HENT: Negative for hoarse voice and sore throat.    Respiratory: Positive for cough and shortness of breath. Negative for hemoptysis, sputum production, choking and wheezing.    Cardiovascular: Negative for chest pain.   Gastrointestinal: Negative for abdominal pain, dysphagia, heartburn and nausea.       Objective   Physical Exam  Vitals reviewed.   Constitutional:       Appearance: Normal appearance.   HENT:      Head: Normocephalic and atraumatic.      Right Ear: Tympanic membrane, ear canal and external ear normal.      Left Ear: Tympanic membrane, ear canal and external ear normal.      Nose: Nose normal.      Mouth/Throat:      Mouth: Mucous membranes are moist.      Pharynx: Oropharynx is clear.   Cardiovascular:      Rate and Rhythm: Normal rate and regular rhythm.      Heart sounds: Normal heart sounds. No murmur heard.    No friction rub. No gallop.   Pulmonary:      Effort: Pulmonary effort is normal. No respiratory distress.      Breath sounds: Normal breath sounds. No stridor. No wheezing, rhonchi or rales.   Chest:     "  Chest wall: No tenderness.   Abdominal:      General: Bowel sounds are normal. There is no distension.      Palpations: Abdomen is soft. There is no mass.      Tenderness: There is no abdominal tenderness. There is no guarding or rebound.      Hernia: No hernia is present.   Musculoskeletal:      Cervical back: Normal range of motion and neck supple.   Skin:     General: Skin is warm and dry.   Neurological:      Mental Status: He is alert.           Visit Vitals  /64   Pulse 104   Ht 165.1 cm (65\")   Wt 68.5 kg (151 lb 1 oz)   SpO2 94%   BMI 25.14 kg/m²     Body mass index is 25.14 kg/m².      Assessment/Plan   Diagnoses and all orders for this visit:    1. Chronic obstructive pulmonary disease, unspecified COPD type (HCC) (Primary)  -     tiotropium bromide monohydrate (Spiriva Respimat) 2.5 MCG/ACT aerosol solution inhaler; Inhale 2 puffs Daily.  Dispense: 4 g; Refill: 8  -     Comprehensive metabolic panel; Future  -     CBC w AUTO Differential; Future    2. Chest tightness  -     albuterol (ACCUNEB) 1.25 MG/3ML nebulizer solution; Take 3 mL by nebulization Every 6 (Six) Hours As Needed for Wheezing.  Dispense: 25 each; Refill: 11    3. Gastroesophageal reflux disease without esophagitis    I reviewed his CBC and CMP .  Return to the clinic in 3 month/s.  Will contact with results as needed.          This document has been electronically signed by Arvin Ness MD on October 25, 2022 15:56 CDT    "

## 2023-01-18 ENCOUNTER — LAB (OUTPATIENT)
Dept: LAB | Facility: HOSPITAL | Age: 63
End: 2023-01-18
Payer: COMMERCIAL

## 2023-01-18 DIAGNOSIS — J44.9 CHRONIC OBSTRUCTIVE PULMONARY DISEASE, UNSPECIFIED COPD TYPE: ICD-10-CM

## 2023-01-18 PROCEDURE — 85025 COMPLETE CBC W/AUTO DIFF WBC: CPT

## 2023-01-18 PROCEDURE — 36415 COLL VENOUS BLD VENIPUNCTURE: CPT

## 2023-01-18 PROCEDURE — 80053 COMPREHEN METABOLIC PANEL: CPT

## 2023-01-19 LAB
ALBUMIN SERPL-MCNC: 4.2 G/DL (ref 3.5–5.2)
ALBUMIN/GLOB SERPL: 1.6 G/DL
ALP SERPL-CCNC: 84 U/L (ref 39–117)
ALT SERPL W P-5'-P-CCNC: 28 U/L (ref 1–41)
ANION GAP SERPL CALCULATED.3IONS-SCNC: 13 MMOL/L (ref 5–15)
AST SERPL-CCNC: 23 U/L (ref 1–40)
BASOPHILS # BLD AUTO: 0.08 10*3/MM3 (ref 0–0.2)
BASOPHILS NFR BLD AUTO: 0.7 % (ref 0–1.5)
BILIRUB SERPL-MCNC: 0.5 MG/DL (ref 0–1.2)
BUN SERPL-MCNC: 9 MG/DL (ref 8–23)
BUN/CREAT SERPL: 13 (ref 7–25)
CALCIUM SPEC-SCNC: 9 MG/DL (ref 8.6–10.5)
CHLORIDE SERPL-SCNC: 101 MMOL/L (ref 98–107)
CO2 SERPL-SCNC: 27 MMOL/L (ref 22–29)
CREAT SERPL-MCNC: 0.69 MG/DL (ref 0.76–1.27)
DEPRECATED RDW RBC AUTO: 42.8 FL (ref 37–54)
EGFRCR SERPLBLD CKD-EPI 2021: 105.3 ML/MIN/1.73
EOSINOPHIL # BLD AUTO: 0.29 10*3/MM3 (ref 0–0.4)
EOSINOPHIL NFR BLD AUTO: 2.7 % (ref 0.3–6.2)
ERYTHROCYTE [DISTWIDTH] IN BLOOD BY AUTOMATED COUNT: 12.8 % (ref 12.3–15.4)
GLOBULIN UR ELPH-MCNC: 2.7 GM/DL
GLUCOSE SERPL-MCNC: 117 MG/DL (ref 65–99)
HCT VFR BLD AUTO: 50.3 % (ref 37.5–51)
HGB BLD-MCNC: 16.7 G/DL (ref 13–17.7)
IMM GRANULOCYTES # BLD AUTO: 0.05 10*3/MM3 (ref 0–0.05)
IMM GRANULOCYTES NFR BLD AUTO: 0.5 % (ref 0–0.5)
LYMPHOCYTES # BLD AUTO: 1.8 10*3/MM3 (ref 0.7–3.1)
LYMPHOCYTES NFR BLD AUTO: 16.7 % (ref 19.6–45.3)
MCH RBC QN AUTO: 30.7 PG (ref 26.6–33)
MCHC RBC AUTO-ENTMCNC: 33.2 G/DL (ref 31.5–35.7)
MCV RBC AUTO: 92.5 FL (ref 79–97)
MONOCYTES # BLD AUTO: 0.86 10*3/MM3 (ref 0.1–0.9)
MONOCYTES NFR BLD AUTO: 8 % (ref 5–12)
NEUTROPHILS NFR BLD AUTO: 7.67 10*3/MM3 (ref 1.7–7)
NEUTROPHILS NFR BLD AUTO: 71.4 % (ref 42.7–76)
NRBC BLD AUTO-RTO: 0 /100 WBC (ref 0–0.2)
PLATELET # BLD AUTO: 366 10*3/MM3 (ref 140–450)
PMV BLD AUTO: 11 FL (ref 6–12)
POTASSIUM SERPL-SCNC: 3.7 MMOL/L (ref 3.5–5.2)
PROT SERPL-MCNC: 6.9 G/DL (ref 6–8.5)
RBC # BLD AUTO: 5.44 10*6/MM3 (ref 4.14–5.8)
SODIUM SERPL-SCNC: 141 MMOL/L (ref 136–145)
WBC NRBC COR # BLD: 10.75 10*3/MM3 (ref 3.4–10.8)

## 2023-01-25 DIAGNOSIS — J44.9 CHRONIC OBSTRUCTIVE PULMONARY DISEASE, UNSPECIFIED COPD TYPE: ICD-10-CM

## 2023-01-25 DIAGNOSIS — J43.9 PULMONARY EMPHYSEMA, UNSPECIFIED EMPHYSEMA TYPE: ICD-10-CM

## 2023-01-25 RX ORDER — BUDESONIDE, GLYCOPYRROLATE, AND FORMOTEROL FUMARATE 160; 9; 4.8 UG/1; UG/1; UG/1
AEROSOL, METERED RESPIRATORY (INHALATION)
Qty: 10.7 G | Refills: 5 | Status: SHIPPED | OUTPATIENT
Start: 2023-01-25 | End: 2023-01-26 | Stop reason: SDUPTHER

## 2023-01-25 NOTE — TELEPHONE ENCOUNTER
Incoming Refill Request      Medication requested (name and dose): [facility] INHALER    Pharmacy where request should be sent: [facility]    Additional details provided by patient:     Best call back number: [phone]    Does the patient have less than a 3 day supply:  [] Yes  [] No    Alessia Malloy Rep  01/25/23, 16:22 CST

## 2023-01-26 ENCOUNTER — OFFICE VISIT (OUTPATIENT)
Dept: FAMILY MEDICINE CLINIC | Facility: CLINIC | Age: 63
End: 2023-01-26
Payer: COMMERCIAL

## 2023-01-26 VITALS
DIASTOLIC BLOOD PRESSURE: 60 MMHG | OXYGEN SATURATION: 97 % | HEART RATE: 89 BPM | BODY MASS INDEX: 26.01 KG/M2 | SYSTOLIC BLOOD PRESSURE: 122 MMHG | HEIGHT: 65 IN | WEIGHT: 156.13 LBS

## 2023-01-26 DIAGNOSIS — R07.89 CHEST TIGHTNESS: ICD-10-CM

## 2023-01-26 DIAGNOSIS — J44.9 CHRONIC OBSTRUCTIVE PULMONARY DISEASE, UNSPECIFIED COPD TYPE: Primary | ICD-10-CM

## 2023-01-26 DIAGNOSIS — R73.09 ELEVATED GLUCOSE: ICD-10-CM

## 2023-01-26 PROCEDURE — 99214 OFFICE O/P EST MOD 30 MIN: CPT | Performed by: FAMILY MEDICINE

## 2023-01-26 RX ORDER — BUDESONIDE, GLYCOPYRROLATE, AND FORMOTEROL FUMARATE 160; 9; 4.8 UG/1; UG/1; UG/1
2 AEROSOL, METERED RESPIRATORY (INHALATION) 2 TIMES DAILY
Qty: 10.7 G | Refills: 0 | COMMUNITY
Start: 2023-01-26

## 2023-01-26 RX ORDER — ALBUTEROL SULFATE 90 UG/1
2 AEROSOL, METERED RESPIRATORY (INHALATION) EVERY 6 HOURS PRN
Qty: 18 G | Refills: 8 | Status: CANCELLED | OUTPATIENT
Start: 2023-01-26

## 2023-01-26 RX ORDER — DILTIAZEM HYDROCHLORIDE 60 MG/1
2 TABLET, FILM COATED ORAL 2 TIMES DAILY
Qty: 10.2 G | Refills: 11 | Status: CANCELLED | OUTPATIENT
Start: 2023-01-26

## 2023-01-26 NOTE — PROGRESS NOTES
Subjective   Timmy Diop is a 62 y.o. male.     COPD  He complains of chest tightness, cough, difficulty breathing, frequent throat clearing, shortness of breath and wheezing. There is no hemoptysis or sputum production. Pertinent negatives include no chest pain, fever or myalgias.   He has episodes of chest tightness. It occurs in the late afternoon. It is improved with using an Inhaler. He denies chest pain. The tightness is not associated with exertion.    The following portions of the patient's history were reviewed and updated as appropriate: allergies, current medications, past family history, past medical history, past social history, past surgical history and problem list.    Review of Systems   Constitutional: Positive for fatigue. Negative for fever.   Respiratory: Positive for cough, choking, shortness of breath and wheezing. Negative for hemoptysis and sputum production.    Cardiovascular: Negative for chest pain, palpitations and leg swelling.   Gastrointestinal: Positive for constipation. Negative for abdominal pain, diarrhea, nausea and vomiting.   Musculoskeletal: Positive for arthralgias and back pain. Negative for gait problem, joint swelling and myalgias.       Objective   Physical Exam  Vitals reviewed.   Constitutional:       Appearance: Normal appearance.   HENT:      Head: Normocephalic and atraumatic.      Right Ear: Tympanic membrane, ear canal and external ear normal.      Left Ear: Tympanic membrane, ear canal and external ear normal.      Nose: Nose normal.      Mouth/Throat:      Mouth: Mucous membranes are moist.      Pharynx: Oropharynx is clear.   Cardiovascular:      Rate and Rhythm: Normal rate and regular rhythm.      Heart sounds: Normal heart sounds. No murmur heard.    No friction rub. No gallop.   Pulmonary:      Effort: Pulmonary effort is normal. No respiratory distress.      Breath sounds: Normal breath sounds. No stridor. No wheezing, rhonchi or rales.   Chest:       "Chest wall: No tenderness.   Abdominal:      General: Bowel sounds are normal. There is no distension.      Palpations: Abdomen is soft. There is no mass.      Tenderness: There is no abdominal tenderness. There is no guarding or rebound.      Hernia: No hernia is present.   Musculoskeletal:      Cervical back: Normal range of motion.      Comments: Tender in the low back.   Skin:     General: Skin is warm and dry.   Neurological:      Mental Status: He is alert.           Visit Vitals  /60   Pulse 89   Ht 165.1 cm (65\")   Wt 70.8 kg (156 lb 2 oz)   SpO2 97%   BMI 25.98 kg/m²     Body mass index is 25.98 kg/m².      Assessment/Plan   Diagnoses and all orders for this visit:    1. Chronic obstructive pulmonary disease, unspecified COPD type (HCC) (Primary)  -     Budeson-Glycopyrrol-Formoterol (Breztri Aerosphere) 160-9-4.8 MCG/ACT aerosol inhaler; Inhale 2 puffs 2 (Two) Times a Day.  Dispense: 10.7 g; Refill: 0    2. Chest tightness    3. Elevated glucose  -     Hemoglobin A1c; Future    I reviewed his CBC and CMP with the patient.  Glucose was elevated.  Return to the clinic in 3 month/s.  Will contact with results as needed.          This document has been electronically signed by Arvin Ness MD on January 26, 2023 16:30 CST    "

## 2023-02-02 ENCOUNTER — LAB (OUTPATIENT)
Dept: LAB | Facility: HOSPITAL | Age: 63
End: 2023-02-02
Payer: COMMERCIAL

## 2023-02-02 DIAGNOSIS — R73.09 ELEVATED GLUCOSE: ICD-10-CM

## 2023-02-02 LAB — HBA1C MFR BLD: 5.5 % (ref 4.8–5.6)

## 2023-02-02 PROCEDURE — 36415 COLL VENOUS BLD VENIPUNCTURE: CPT

## 2023-02-02 PROCEDURE — 83036 HEMOGLOBIN GLYCOSYLATED A1C: CPT

## 2023-02-16 ENCOUNTER — TELEPHONE (OUTPATIENT)
Dept: FAMILY MEDICINE CLINIC | Facility: CLINIC | Age: 63
End: 2023-02-16
Payer: COMMERCIAL

## 2023-03-20 ENCOUNTER — TELEPHONE (OUTPATIENT)
Dept: FAMILY MEDICINE CLINIC | Facility: CLINIC | Age: 63
End: 2023-03-20
Payer: COMMERCIAL

## 2023-03-20 NOTE — TELEPHONE ENCOUNTER
Ms. Diop called in for Mr. Diop, wanting to see if Dr Ness has samples of the Briztri inhaler and a Stiolto Inhaler.    Pt call back is [phone]

## 2023-03-22 ENCOUNTER — TELEPHONE (OUTPATIENT)
Dept: FAMILY MEDICINE CLINIC | Facility: CLINIC | Age: 63
End: 2023-03-22
Payer: COMMERCIAL

## 2023-03-22 NOTE — TELEPHONE ENCOUNTER
03/22/2023  Spoke with the patient wife to let her know that we had samples of the Breztri inhaler but not the Stiolto.  Mr. Diop will pick the samples up after work.

## 2023-04-26 ENCOUNTER — OFFICE VISIT (OUTPATIENT)
Dept: FAMILY MEDICINE CLINIC | Facility: CLINIC | Age: 63
End: 2023-04-26
Payer: COMMERCIAL

## 2023-04-26 ENCOUNTER — LAB (OUTPATIENT)
Dept: LAB | Facility: HOSPITAL | Age: 63
End: 2023-04-26
Payer: COMMERCIAL

## 2023-04-26 VITALS
HEART RATE: 94 BPM | SYSTOLIC BLOOD PRESSURE: 120 MMHG | DIASTOLIC BLOOD PRESSURE: 72 MMHG | HEIGHT: 65 IN | OXYGEN SATURATION: 95 % | BODY MASS INDEX: 26.56 KG/M2 | WEIGHT: 159.4 LBS

## 2023-04-26 DIAGNOSIS — K21.9 GASTROESOPHAGEAL REFLUX DISEASE WITHOUT ESOPHAGITIS: Primary | ICD-10-CM

## 2023-04-26 DIAGNOSIS — K21.9 GASTROESOPHAGEAL REFLUX DISEASE WITHOUT ESOPHAGITIS: ICD-10-CM

## 2023-04-26 DIAGNOSIS — J44.9 CHRONIC OBSTRUCTIVE PULMONARY DISEASE, UNSPECIFIED COPD TYPE: ICD-10-CM

## 2023-04-26 LAB
ALBUMIN SERPL-MCNC: 3.8 G/DL (ref 3.5–5.2)
ALBUMIN/GLOB SERPL: 1.2 G/DL
ALP SERPL-CCNC: 73 U/L (ref 39–117)
ALT SERPL W P-5'-P-CCNC: 23 U/L (ref 1–41)
ANION GAP SERPL CALCULATED.3IONS-SCNC: 8.7 MMOL/L (ref 5–15)
AST SERPL-CCNC: 22 U/L (ref 1–40)
BASOPHILS # BLD AUTO: 0.07 10*3/MM3 (ref 0–0.2)
BASOPHILS NFR BLD AUTO: 0.7 % (ref 0–1.5)
BILIRUB SERPL-MCNC: 0.3 MG/DL (ref 0–1.2)
BUN SERPL-MCNC: 10 MG/DL (ref 8–23)
BUN/CREAT SERPL: 15.4 (ref 7–25)
CALCIUM SPEC-SCNC: 9.5 MG/DL (ref 8.6–10.5)
CHLORIDE SERPL-SCNC: 107 MMOL/L (ref 98–107)
CO2 SERPL-SCNC: 30.3 MMOL/L (ref 22–29)
CREAT SERPL-MCNC: 0.65 MG/DL (ref 0.76–1.27)
DEPRECATED RDW RBC AUTO: 41.8 FL (ref 37–54)
EGFRCR SERPLBLD CKD-EPI 2021: 107.2 ML/MIN/1.73
EOSINOPHIL # BLD AUTO: 0.25 10*3/MM3 (ref 0–0.4)
EOSINOPHIL NFR BLD AUTO: 2.4 % (ref 0.3–6.2)
ERYTHROCYTE [DISTWIDTH] IN BLOOD BY AUTOMATED COUNT: 12.7 % (ref 12.3–15.4)
GLOBULIN UR ELPH-MCNC: 3.1 GM/DL
GLUCOSE SERPL-MCNC: 93 MG/DL (ref 65–99)
HCT VFR BLD AUTO: 48.6 % (ref 37.5–51)
HGB BLD-MCNC: 16.5 G/DL (ref 13–17.7)
IMM GRANULOCYTES # BLD AUTO: 0.05 10*3/MM3 (ref 0–0.05)
IMM GRANULOCYTES NFR BLD AUTO: 0.5 % (ref 0–0.5)
LYMPHOCYTES # BLD AUTO: 1.52 10*3/MM3 (ref 0.7–3.1)
LYMPHOCYTES NFR BLD AUTO: 14.6 % (ref 19.6–45.3)
MCH RBC QN AUTO: 30.8 PG (ref 26.6–33)
MCHC RBC AUTO-ENTMCNC: 34 G/DL (ref 31.5–35.7)
MCV RBC AUTO: 90.8 FL (ref 79–97)
MONOCYTES # BLD AUTO: 0.96 10*3/MM3 (ref 0.1–0.9)
MONOCYTES NFR BLD AUTO: 9.2 % (ref 5–12)
NEUTROPHILS NFR BLD AUTO: 7.55 10*3/MM3 (ref 1.7–7)
NEUTROPHILS NFR BLD AUTO: 72.6 % (ref 42.7–76)
NRBC BLD AUTO-RTO: 0 /100 WBC (ref 0–0.2)
PLATELET # BLD AUTO: 343 10*3/MM3 (ref 140–450)
PMV BLD AUTO: 10.9 FL (ref 6–12)
POTASSIUM SERPL-SCNC: 3.8 MMOL/L (ref 3.5–5.2)
PROT SERPL-MCNC: 6.9 G/DL (ref 6–8.5)
RBC # BLD AUTO: 5.35 10*6/MM3 (ref 4.14–5.8)
SODIUM SERPL-SCNC: 146 MMOL/L (ref 136–145)
WBC NRBC COR # BLD: 10.4 10*3/MM3 (ref 3.4–10.8)

## 2023-04-26 PROCEDURE — 80053 COMPREHEN METABOLIC PANEL: CPT

## 2023-04-26 PROCEDURE — 99214 OFFICE O/P EST MOD 30 MIN: CPT | Performed by: FAMILY MEDICINE

## 2023-04-26 PROCEDURE — 36415 COLL VENOUS BLD VENIPUNCTURE: CPT

## 2023-04-26 PROCEDURE — 85025 COMPLETE CBC W/AUTO DIFF WBC: CPT

## 2023-04-26 RX ORDER — COVID-19 ANTIGEN TEST
KIT MISCELLANEOUS
COMMUNITY
Start: 2023-04-04

## 2023-04-26 NOTE — PROGRESS NOTES
Subjective   Timmy Diop is a 62 y.o. male.   Cc: follow up of chronic medical issues    Heartburn  He complains of early satiety. He reports no abdominal pain, no belching, no chest pain, no choking, no coughing, no dysphagia, no heartburn, no hoarse voice, no nausea, no sore throat, no water brash or no wheezing.   COPD  He complains of difficulty breathing, frequent throat clearing and shortness of breath. There is no chest tightness, cough, hemoptysis, hoarse voice, sputum production or wheezing. Pertinent negatives include no chest pain, heartburn or sore throat.       The following portions of the patient's history were reviewed and updated as appropriate: allergies, current medications, past family history, past medical history, past social history, past surgical history and problem list.    Review of Systems   HENT: Negative for hoarse voice and sore throat.    Respiratory: Positive for shortness of breath. Negative for cough, hemoptysis, sputum production, choking and wheezing.    Cardiovascular: Negative for chest pain.   Gastrointestinal: Negative for abdominal pain, dysphagia, heartburn and nausea.       Objective   Physical Exam  Vitals reviewed.   Constitutional:       Appearance: Normal appearance.   HENT:      Head: Normocephalic and atraumatic.      Right Ear: Tympanic membrane, ear canal and external ear normal.      Left Ear: Tympanic membrane, ear canal and external ear normal.      Nose: Nose normal.      Mouth/Throat:      Mouth: Mucous membranes are moist.      Pharynx: Oropharynx is clear.   Cardiovascular:      Rate and Rhythm: Normal rate and regular rhythm.      Heart sounds: Normal heart sounds. No murmur heard.    No friction rub. No gallop.   Pulmonary:      Effort: Pulmonary effort is normal. No respiratory distress.      Breath sounds: Normal breath sounds. No stridor. No wheezing, rhonchi or rales.   Chest:      Chest wall: No tenderness.   Abdominal:      General: Bowel sounds  "are normal. There is no distension.      Palpations: Abdomen is soft. There is no mass.      Tenderness: There is no abdominal tenderness. There is no guarding or rebound.      Hernia: No hernia is present.   Musculoskeletal:      Cervical back: Normal range of motion.   Skin:     General: Skin is warm and dry.      Comments: He has cysts in the axilla. They are about two inches in diameter. This is a chronic finding.   Neurological:      Mental Status: He is alert.           Visit Vitals  /72 (BP Location: Left arm)   Pulse 94   Ht 165.1 cm (65\")   Wt 72.3 kg (159 lb 6.4 oz)   SpO2 95%   BMI 26.53 kg/m²     Body mass index is 26.53 kg/m².      Assessment/Plan   Diagnoses and all orders for this visit:    1. Gastroesophageal reflux disease without esophagitis (Primary)  -     Comprehensive metabolic panel; Future  -     CBC w AUTO Differential; Future    2. Chronic obstructive pulmonary disease, unspecified COPD type  -     Comprehensive metabolic panel; Future  -     CBC w AUTO Differential; Future    I reviewed the CBC,CMP 01/18/23 and Hgb A 1c 02/02/23 with the patient.  GERD and COPD are stable . Continue current medications.  Return to the clinic in 3 month/s.  Will contact with results as needed.            This document has been electronically signed by Arvin Ness MD on April 26, 2023 12:24 CDT    "

## 2023-07-26 ENCOUNTER — OFFICE VISIT (OUTPATIENT)
Dept: FAMILY MEDICINE CLINIC | Facility: CLINIC | Age: 63
End: 2023-07-26
Payer: COMMERCIAL

## 2023-07-26 ENCOUNTER — LAB (OUTPATIENT)
Dept: LAB | Facility: HOSPITAL | Age: 63
End: 2023-07-26
Payer: COMMERCIAL

## 2023-07-26 VITALS
OXYGEN SATURATION: 95 % | HEIGHT: 65 IN | SYSTOLIC BLOOD PRESSURE: 130 MMHG | HEART RATE: 84 BPM | DIASTOLIC BLOOD PRESSURE: 78 MMHG | BODY MASS INDEX: 26.53 KG/M2 | WEIGHT: 159.25 LBS

## 2023-07-26 DIAGNOSIS — L98.9 SKIN LESION OF RIGHT LEG: ICD-10-CM

## 2023-07-26 DIAGNOSIS — K21.9 GASTROESOPHAGEAL REFLUX DISEASE WITHOUT ESOPHAGITIS: Primary | ICD-10-CM

## 2023-07-26 DIAGNOSIS — J44.9 CHRONIC OBSTRUCTIVE PULMONARY DISEASE, UNSPECIFIED COPD TYPE: ICD-10-CM

## 2023-07-26 DIAGNOSIS — L72.9 CYST OF SCROTUM: ICD-10-CM

## 2023-07-26 DIAGNOSIS — K21.9 GASTROESOPHAGEAL REFLUX DISEASE WITHOUT ESOPHAGITIS: ICD-10-CM

## 2023-07-26 PROCEDURE — 85025 COMPLETE CBC W/AUTO DIFF WBC: CPT

## 2023-07-26 PROCEDURE — 36415 COLL VENOUS BLD VENIPUNCTURE: CPT

## 2023-07-26 PROCEDURE — 99214 OFFICE O/P EST MOD 30 MIN: CPT | Performed by: FAMILY MEDICINE

## 2023-07-26 PROCEDURE — 80053 COMPREHEN METABOLIC PANEL: CPT

## 2023-07-26 PROCEDURE — 80061 LIPID PANEL: CPT

## 2023-07-26 RX ORDER — ALBUTEROL SULFATE 90 UG/1
2 AEROSOL, METERED RESPIRATORY (INHALATION) EVERY 6 HOURS PRN
Qty: 18 G | Refills: 8 | Status: CANCELLED | OUTPATIENT
Start: 2023-07-26

## 2023-07-26 RX ORDER — TIOTROPIUM BROMIDE INHALATION SPRAY 3.12 UG/1
2 SPRAY, METERED RESPIRATORY (INHALATION)
Qty: 4 G | Refills: 8 | Status: CANCELLED | OUTPATIENT
Start: 2023-07-26

## 2023-07-26 NOTE — PROGRESS NOTES
Subjective   Timmy Diop is a 62 y.o. male.   Cc: follow up of chronic medical issues    COPD  He complains of cough, difficulty breathing, shortness of breath and wheezing. There is no chest tightness, frequent throat clearing, hemoptysis, hoarse voice or sputum production. Pertinent negatives include no chest pain, heartburn or sore throat.   Heartburn  He complains of coughing, early satiety and wheezing. He reports no abdominal pain, no belching, no chest pain, no choking, no dysphagia, no heartburn, no hoarse voice, no nausea, no sore throat or no water brash.     The following portions of the patient's history were reviewed and updated as appropriate: allergies, current medications, past family history, past medical history, past social history, past surgical history, and problem list.    Review of Systems   HENT:  Negative for hoarse voice and sore throat.    Respiratory:  Positive for cough, shortness of breath and wheezing. Negative for hemoptysis, sputum production and choking.    Cardiovascular:  Negative for chest pain.   Gastrointestinal:  Negative for abdominal pain, dysphagia, heartburn and nausea.     Objective   Physical Exam  Vitals reviewed.   Constitutional:       Appearance: Normal appearance.   HENT:      Head: Normocephalic and atraumatic.      Right Ear: Tympanic membrane, ear canal and external ear normal.      Left Ear: Tympanic membrane, ear canal and external ear normal.      Nose: Nose normal.      Mouth/Throat:      Mouth: Mucous membranes are moist.      Pharynx: Oropharynx is clear.   Cardiovascular:      Rate and Rhythm: Normal rate and regular rhythm.      Heart sounds: Normal heart sounds. No murmur heard.    No friction rub. No gallop.   Pulmonary:      Effort: Pulmonary effort is normal. No respiratory distress.      Breath sounds: Normal breath sounds. No stridor. No wheezing, rhonchi or rales.   Chest:      Chest wall: No tenderness.   Abdominal:      General: Bowel  "sounds are normal. There is no distension.      Palpations: Abdomen is soft. There is no mass.      Tenderness: There is no abdominal tenderness. There is no guarding or rebound.      Hernia: No hernia is present.   Skin:     General: Skin is warm and dry.      Comments: He has a lesion behind his right knee. It is swollen and is 1/3 inch in diameter with a central core that has fallen out. He has a cystic lesion on the right side of his scrotum. It is 1/4 of an inch in diameter.   Neurological:      Mental Status: He is alert.         Visit Vitals  /78   Pulse 84   Ht 165.1 cm (65\")   Wt 72.2 kg (159 lb 4 oz)   SpO2 95%   BMI 26.50 kg/m²     Body mass index is 26.5 kg/m².      Assessment/Plan   Diagnoses and all orders for this visit:    1. Gastroesophageal reflux disease without esophagitis (Primary)  -     Comprehensive metabolic panel; Future  -     Lipid panel; Future  -     CBC w AUTO Differential; Future    2. Chronic obstructive pulmonary disease, unspecified COPD type  -     Comprehensive metabolic panel; Future  -     CBC w AUTO Differential; Future    3. Cyst of scrotum    4. Skin lesion of right leg    I reviewed the CBC and CMP from April 2023 with the patient.  Return to the clinic in 3 month/s.  Will contact with results as needed.  The above medical issues are stable.Continue with current medication.  Will follow the lesion on the scrotum. If it gets larger will refer to dermatology.  Will monitor the lesion on the right leg as well. Continue with band aid.          This document has been electronically signed by Arvin Ness MD on July 26, 2023 13:02 CDT    "

## 2023-07-27 LAB
ALBUMIN SERPL-MCNC: 4.2 G/DL (ref 3.5–5.2)
ALBUMIN/GLOB SERPL: 1.4 G/DL
ALP SERPL-CCNC: 78 U/L (ref 39–117)
ALT SERPL W P-5'-P-CCNC: 24 U/L (ref 1–41)
ANION GAP SERPL CALCULATED.3IONS-SCNC: 11.8 MMOL/L (ref 5–15)
AST SERPL-CCNC: 23 U/L (ref 1–40)
BASOPHILS # BLD AUTO: 0.11 10*3/MM3 (ref 0–0.2)
BASOPHILS NFR BLD AUTO: 1.2 % (ref 0–1.5)
BILIRUB SERPL-MCNC: 0.4 MG/DL (ref 0–1.2)
BUN SERPL-MCNC: 9 MG/DL (ref 8–23)
BUN/CREAT SERPL: 14.8 (ref 7–25)
CALCIUM SPEC-SCNC: 9.4 MG/DL (ref 8.6–10.5)
CHLORIDE SERPL-SCNC: 106 MMOL/L (ref 98–107)
CHOLEST SERPL-MCNC: 168 MG/DL (ref 0–200)
CO2 SERPL-SCNC: 27.2 MMOL/L (ref 22–29)
CREAT SERPL-MCNC: 0.61 MG/DL (ref 0.76–1.27)
DEPRECATED RDW RBC AUTO: 42.4 FL (ref 37–54)
EGFRCR SERPLBLD CKD-EPI 2021: 109.3 ML/MIN/1.73
EOSINOPHIL # BLD AUTO: 0.58 10*3/MM3 (ref 0–0.4)
EOSINOPHIL NFR BLD AUTO: 6.1 % (ref 0.3–6.2)
ERYTHROCYTE [DISTWIDTH] IN BLOOD BY AUTOMATED COUNT: 12.8 % (ref 12.3–15.4)
GLOBULIN UR ELPH-MCNC: 3 GM/DL
GLUCOSE SERPL-MCNC: 86 MG/DL (ref 65–99)
HCT VFR BLD AUTO: 49.8 % (ref 37.5–51)
HDLC SERPL-MCNC: 37 MG/DL (ref 40–60)
HGB BLD-MCNC: 16.7 G/DL (ref 13–17.7)
IMM GRANULOCYTES # BLD AUTO: 0.04 10*3/MM3 (ref 0–0.05)
IMM GRANULOCYTES NFR BLD AUTO: 0.4 % (ref 0–0.5)
LDLC SERPL CALC-MCNC: 111 MG/DL (ref 0–100)
LDLC/HDLC SERPL: 2.96 {RATIO}
LYMPHOCYTES # BLD AUTO: 1.57 10*3/MM3 (ref 0.7–3.1)
LYMPHOCYTES NFR BLD AUTO: 16.5 % (ref 19.6–45.3)
MCH RBC QN AUTO: 30.6 PG (ref 26.6–33)
MCHC RBC AUTO-ENTMCNC: 33.5 G/DL (ref 31.5–35.7)
MCV RBC AUTO: 91.2 FL (ref 79–97)
MONOCYTES # BLD AUTO: 0.89 10*3/MM3 (ref 0.1–0.9)
MONOCYTES NFR BLD AUTO: 9.3 % (ref 5–12)
NEUTROPHILS NFR BLD AUTO: 6.34 10*3/MM3 (ref 1.7–7)
NEUTROPHILS NFR BLD AUTO: 66.5 % (ref 42.7–76)
NRBC BLD AUTO-RTO: 0 /100 WBC (ref 0–0.2)
PLATELET # BLD AUTO: 351 10*3/MM3 (ref 140–450)
PMV BLD AUTO: 10.8 FL (ref 6–12)
POTASSIUM SERPL-SCNC: 4.8 MMOL/L (ref 3.5–5.2)
PROT SERPL-MCNC: 7.2 G/DL (ref 6–8.5)
RBC # BLD AUTO: 5.46 10*6/MM3 (ref 4.14–5.8)
SODIUM SERPL-SCNC: 145 MMOL/L (ref 136–145)
TRIGL SERPL-MCNC: 107 MG/DL (ref 0–150)
VLDLC SERPL-MCNC: 20 MG/DL (ref 5–40)
WBC NRBC COR # BLD: 9.53 10*3/MM3 (ref 3.4–10.8)
